# Patient Record
Sex: MALE | Race: WHITE | NOT HISPANIC OR LATINO | Employment: OTHER | URBAN - METROPOLITAN AREA
[De-identification: names, ages, dates, MRNs, and addresses within clinical notes are randomized per-mention and may not be internally consistent; named-entity substitution may affect disease eponyms.]

---

## 2017-12-01 ENCOUNTER — OFFICE VISIT (OUTPATIENT)
Dept: ORTHOPEDIC SURGERY | Facility: CLINIC | Age: 72
End: 2017-12-01

## 2017-12-01 VITALS — TEMPERATURE: 99 F | BODY MASS INDEX: 38.27 KG/M2 | WEIGHT: 216 LBS | HEIGHT: 63 IN

## 2017-12-01 DIAGNOSIS — M12.811 ROTATOR CUFF TEAR ARTHROPATHY, RIGHT: Primary | ICD-10-CM

## 2017-12-01 DIAGNOSIS — M75.101 ROTATOR CUFF TEAR ARTHROPATHY, RIGHT: Primary | ICD-10-CM

## 2017-12-01 PROCEDURE — 99204 OFFICE O/P NEW MOD 45 MIN: CPT | Performed by: ORTHOPAEDIC SURGERY

## 2017-12-01 RX ORDER — AMITRIPTYLINE HYDROCHLORIDE 25 MG/1
25 TABLET, FILM COATED ORAL NIGHTLY
COMMUNITY
End: 2018-06-21

## 2017-12-01 RX ORDER — ASPIRIN 81 MG/1
81 TABLET ORAL EVERY OTHER DAY
COMMUNITY

## 2017-12-01 RX ORDER — CETIRIZINE HYDROCHLORIDE 10 MG/1
10 TABLET ORAL DAILY PRN
COMMUNITY

## 2017-12-01 RX ORDER — LISINOPRIL 20 MG/1
10 TABLET ORAL EVERY MORNING
COMMUNITY
End: 2023-03-05 | Stop reason: HOSPADM

## 2017-12-01 RX ORDER — FINASTERIDE 5 MG/1
5 TABLET, FILM COATED ORAL EVERY MORNING
COMMUNITY
End: 2023-02-24

## 2017-12-01 RX ORDER — FERROUS SULFATE 325(65) MG
325 TABLET ORAL 2 TIMES DAILY
COMMUNITY

## 2017-12-01 RX ORDER — GABAPENTIN 400 MG/1
800 CAPSULE ORAL 3 TIMES DAILY
COMMUNITY

## 2017-12-01 RX ORDER — OMEPRAZOLE 20 MG/1
20 CAPSULE, DELAYED RELEASE ORAL EVERY EVENING
COMMUNITY

## 2017-12-01 RX ORDER — CHLORAL HYDRATE 500 MG
1000 CAPSULE ORAL
COMMUNITY

## 2017-12-01 RX ORDER — FUROSEMIDE 20 MG/1
20 TABLET ORAL EVERY MORNING
COMMUNITY

## 2017-12-01 RX ORDER — CHOLECALCIFEROL (VITAMIN D3) 125 MCG
500 CAPSULE ORAL EVERY OTHER DAY
COMMUNITY

## 2017-12-01 RX ORDER — SIMVASTATIN 20 MG
10 TABLET ORAL NIGHTLY
COMMUNITY

## 2018-01-03 PROBLEM — M75.101 ROTATOR CUFF TEAR ARTHROPATHY, RIGHT: Status: ACTIVE | Noted: 2018-01-03

## 2018-01-03 PROBLEM — M12.811 ROTATOR CUFF TEAR ARTHROPATHY, RIGHT: Status: ACTIVE | Noted: 2018-01-03

## 2018-01-10 ENCOUNTER — APPOINTMENT (OUTPATIENT)
Dept: PREADMISSION TESTING | Facility: HOSPITAL | Age: 73
End: 2018-01-10

## 2018-01-10 VITALS
DIASTOLIC BLOOD PRESSURE: 78 MMHG | TEMPERATURE: 98.6 F | WEIGHT: 222 LBS | SYSTOLIC BLOOD PRESSURE: 118 MMHG | OXYGEN SATURATION: 96 % | BODY MASS INDEX: 40.85 KG/M2 | HEIGHT: 62 IN | HEART RATE: 73 BPM | RESPIRATION RATE: 20 BRPM

## 2018-01-10 DIAGNOSIS — M12.811 ROTATOR CUFF TEAR ARTHROPATHY, RIGHT: ICD-10-CM

## 2018-01-10 DIAGNOSIS — M75.101 ROTATOR CUFF TEAR ARTHROPATHY, RIGHT: ICD-10-CM

## 2018-01-10 LAB
ANION GAP SERPL CALCULATED.3IONS-SCNC: 10.8 MMOL/L
BACTERIA UR QL AUTO: ABNORMAL /HPF
BILIRUB UR QL STRIP: NEGATIVE
BUN BLD-MCNC: 20 MG/DL (ref 8–23)
BUN/CREAT SERPL: 18.9 (ref 7–25)
CALCIUM SPEC-SCNC: 8.9 MG/DL (ref 8.6–10.5)
CHLORIDE SERPL-SCNC: 103 MMOL/L (ref 98–107)
CLARITY UR: CLEAR
CO2 SERPL-SCNC: 28.2 MMOL/L (ref 22–29)
COLOR UR: YELLOW
CREAT BLD-MCNC: 1.06 MG/DL (ref 0.76–1.27)
DEPRECATED RDW RBC AUTO: 45.8 FL (ref 37–54)
ERYTHROCYTE [DISTWIDTH] IN BLOOD BY AUTOMATED COUNT: 12.9 % (ref 11.5–14.5)
GFR SERPL CREATININE-BSD FRML MDRD: 69 ML/MIN/1.73
GFR SERPL CREATININE-BSD FRML MDRD: 83 ML/MIN/1.73
GLUCOSE BLD-MCNC: 128 MG/DL (ref 65–99)
GLUCOSE UR STRIP-MCNC: ABNORMAL MG/DL
HCT VFR BLD AUTO: 37.6 % (ref 40.4–52.2)
HGB BLD-MCNC: 11.9 G/DL (ref 13.7–17.6)
HGB UR QL STRIP.AUTO: NEGATIVE
HYALINE CASTS UR QL AUTO: ABNORMAL /LPF
KETONES UR QL STRIP: NEGATIVE
LEUKOCYTE ESTERASE UR QL STRIP.AUTO: ABNORMAL
MCH RBC QN AUTO: 30.8 PG (ref 27–32.7)
MCHC RBC AUTO-ENTMCNC: 31.6 G/DL (ref 32.6–36.4)
MCV RBC AUTO: 97.4 FL (ref 79.8–96.2)
NITRITE UR QL STRIP: NEGATIVE
PH UR STRIP.AUTO: <=5 [PH] (ref 5–8)
PLATELET # BLD AUTO: 197 10*3/MM3 (ref 140–500)
PMV BLD AUTO: 10.3 FL (ref 6–12)
POTASSIUM BLD-SCNC: 4.5 MMOL/L (ref 3.5–5.2)
PROT UR QL STRIP: NEGATIVE
RBC # BLD AUTO: 3.86 10*6/MM3 (ref 4.6–6)
RBC # UR: ABNORMAL /HPF
REF LAB TEST METHOD: ABNORMAL
SODIUM BLD-SCNC: 142 MMOL/L (ref 136–145)
SP GR UR STRIP: 1.02 (ref 1–1.03)
SQUAMOUS #/AREA URNS HPF: ABNORMAL /HPF
UROBILINOGEN UR QL STRIP: ABNORMAL
WBC NRBC COR # BLD: 6.24 10*3/MM3 (ref 4.5–10.7)
WBC UR QL AUTO: ABNORMAL /HPF

## 2018-01-10 PROCEDURE — 80048 BASIC METABOLIC PNL TOTAL CA: CPT | Performed by: ORTHOPAEDIC SURGERY

## 2018-01-10 PROCEDURE — 36415 COLL VENOUS BLD VENIPUNCTURE: CPT

## 2018-01-10 PROCEDURE — 87086 URINE CULTURE/COLONY COUNT: CPT | Performed by: ORTHOPAEDIC SURGERY

## 2018-01-10 PROCEDURE — 81001 URINALYSIS AUTO W/SCOPE: CPT | Performed by: ORTHOPAEDIC SURGERY

## 2018-01-10 PROCEDURE — 85027 COMPLETE CBC AUTOMATED: CPT | Performed by: ORTHOPAEDIC SURGERY

## 2018-01-10 RX ORDER — OMEGA-3S/DHA/EPA/FISH OIL/D3 300MG-1000
400 CAPSULE ORAL 2 TIMES DAILY
COMMUNITY
End: 2018-06-21

## 2018-01-10 RX ORDER — CEPHALEXIN 500 MG/1
500 CAPSULE ORAL EVERY MORNING
COMMUNITY

## 2018-01-10 NOTE — DISCHARGE INSTRUCTIONS
Take the following medications the morning of surgery with a small sip of water:omeprazole, pain med if needed        General Instructions:  • Do not eat solid food after midnight the night before surgery.  • You may drink clear liquids day of surgery but must stop at least one hour before your hospital arrival time.  • It is beneficial for you to have a clear drink that contains carbohydrates the day of surgery.  We suggest a 12 to 20 ounce bottle of Gatorade or Powerade for non-diabetic patients or a 12 to 20 ounce bottle of G2 or Powerade Zero for diabetic patients. (Pediatric patients, are not advised to drink a 12 to 20 ounce carbohydrate drink)    Clear liquids are liquids you can see through.  Nothing red in color.     Plain water                               Sports drinks  Sodas                                   Gelatin (Jell-O)  Fruit juices without pulp such as white grape juice and apple juice  Popsicles that contain no fruit or yogurt  Tea or coffee (no cream or milk added)  Gatorade / Powerade  G2 / Powerade Zero    • Infants may have breast milk up to four hours before surgery.  • Infants drinking formula may drink formula up to six hours before surgery.   • Patients who avoid smoking, chewing tobacco and alcohol for 4 weeks prior to surgery have a reduced risk of post-operative complications.  Quit smoking as many days before surgery as you can.  • Do not smoke, use chewing tobacco or drink alcohol the day of surgery.   • If applicable bring your C-PAP/ BI-PAP machine.  • Bring any papers given to you in the doctor’s office.  • Wear clean comfortable clothes and socks.  • Do not wear contact lenses or make-up.  Bring a case for your glasses.   • Bring crutches or walker if applicable.  • Remove all piercings.  Leave jewelry and any other valuables at home.  • Hair extensions with metal clips must be removed prior to surgery.  • The Pre-Admission Testing nurse will instruct you to bring medications if  unable to obtain an accurate list in Pre-Admission Testing.        If you were given a blood bank ID arm band remember to bring it with you the day of surgery.    Preventing a Surgical Site Infection:  • For 2 to 3 days before surgery, avoid shaving with a razor because the razor can irritate skin and make it easier to develop an infection.  • The night prior to surgery sleep in a clean bed with clean clothing.  Do not allow pets to sleep with you.  • Shower on the morning of surgery using a fresh bar of anti-bacterial soap (such as Dial) and clean washcloth.  Dry with a clean towel and dress in clean clothing.  • Ask your surgeon if you will be receiving antibiotics prior to surgery.  • Make sure you, your family, and all healthcare providers clean their hands with soap and water or an alcohol based hand  before caring for you or your wound.    Day of surgery:  Upon arrival, a Pre-op nurse and Anesthesiologist will review your health history, obtain vital signs, and answer questions you may have.  The only belongings needed at this time will be your home medications and if applicable your C-PAP/BI-PAP machine.  If you are staying overnight your family can leave the rest of your belongings in the car and bring them to your room later.  A Pre-op nurse will start an IV and you may receive medication in preparation for surgery, including something to help you relax.  Your family will be able to see you in the Pre-op area.  While you are in surgery your family should notify the waiting room  if they leave the waiting room area and provide a contact phone number.    Please be aware that surgery does come with discomfort.  We want to make every effort to control your discomfort so please discuss any uncontrolled symptoms with your nurse.   Your doctor will most likely have prescribed pain medications.      If you are going home after surgery you will receive individualized written care instructions  before being discharged.  A responsible adult must drive you to and from the hospital on the day of your surgery and stay with you for 24 hours.    If you are staying overnight following surgery, you will be transported to your hospital room following the recovery period.  [facility] has all private rooms.    If you have any questions please call Pre-Admission Testing at [phone].  Deductibles and co-payments are collected on the day of service. Please be prepared to pay the required co-pay, deductible or deposit on the day of service as defined by your plan.    2% CHLORAHEXIDINE GLUCONATE* CLOTH  Preparing or “prepping” skin before surgery can reduce the risk of infection at the surgical site. To make the process easier, [facility] has chosen disposable cloths moistened with a rinse-free, 2% Chlorhexidine Gluconate (CHG) antiseptic solution. The steps below outline the prepping process and should be carefully followed.        Use the prep cloth on the area that is circled in the diagram             Directions Night before Surgery  1) Shower using a fresh bar of anti-bacterial soap (such as Dial) and clean washcloth.  Use a clean towel to completely dry your skin.  2) Do not use any lotions, oils or creams on your skin.  3) Open the package and remove 1 cloth, wipe your skin for 30 seconds in a circular motion.  Allow to dry for 3 minutes.  4) Repeat #3 with second cloth.  5) Do not touch your eyes, ears, or mouth with the prep cloth.  6) Allow the wet prep solution to air dry.  7) Discard the prep cloth and wash your hands with soap and water.   8) Dress in clean bed clothes and sleep on fresh clean bed sheets.   9) You may experience some temporary itching after the prep.    Directions Day of Surgery  1) Repeat steps 1,2,3,4,5,6,7, and 9.   2) Dress in clean clothes before coming to the hospital.    BACTROBAN NASAL OINTMENT  There are many germs normally in your nose. Bactroban is an  ointment that will help reduce these germs. Please follow these instructions for Bactroban use:    ____Two days before surgery in the evening Date________    ____The day before surgery in the morning  Date________    ____The day before surgery in the evening              Date________    ____The day of surgery in the morning    Date________    **Squirt ½ package of Bactroban Ointment onto a cotton applicator and apply to inside of 1st nostril.  Squirt the remaining Bactroban and apply to the inside of the other nostril.    PERIDEX- ORAL:  Use only if your surgeon has ordered  Use the night before and morning of surgery - Swish, gargle, and spit - do not swallow.

## 2018-01-12 ENCOUNTER — OFFICE VISIT (OUTPATIENT)
Dept: ORTHOPEDIC SURGERY | Facility: CLINIC | Age: 73
End: 2018-01-12

## 2018-01-12 VITALS — WEIGHT: 223 LBS | BODY MASS INDEX: 38.07 KG/M2 | TEMPERATURE: 97.5 F | HEIGHT: 64 IN

## 2018-01-12 DIAGNOSIS — M12.811 ROTATOR CUFF TEAR ARTHROPATHY, RIGHT: Primary | ICD-10-CM

## 2018-01-12 DIAGNOSIS — M75.101 ROTATOR CUFF TEAR ARTHROPATHY, RIGHT: Primary | ICD-10-CM

## 2018-01-12 DIAGNOSIS — Z01.818 PRE-OP EVALUATION: ICD-10-CM

## 2018-01-12 LAB
BACTERIA SPEC AEROBE CULT: ABNORMAL
BACTERIA SPEC AEROBE CULT: ABNORMAL

## 2018-01-12 PROCEDURE — 73030 X-RAY EXAM OF SHOULDER: CPT | Performed by: ORTHOPAEDIC SURGERY

## 2018-01-12 PROCEDURE — S0260 H&P FOR SURGERY: HCPCS | Performed by: ORTHOPAEDIC SURGERY

## 2018-01-14 NOTE — PROGRESS NOTES
History & Physical         Patient: Jermaine Denis    YOB: 1945    Medical Record Number: [id number]    Chief Complaints:   Chief Complaint   Patient presents with   • Right Shoulder - Pre-op Exam       History of Present Illness: 72 y.o. male presents with   Chief Complaint   Patient presents with   • Right Shoulder - Pre-op Exam   Reports a long history of progressively worsening pain, motion loss, and dysfunction.  Describes current pain as severe.  Has failed prolonged conservative treatment.  Denies any new complaints or issues.    Allergies: No Known Allergies    Medications:   Home Medications:    Current Outpatient Prescriptions:   •  amitriptyline (ELAVIL) 25 MG tablet, Take 25 mg by mouth Every Night., Disp: , Rfl:   •  aspirin 81 MG EC tablet, Take 81 mg by mouth Every Morning. Stopped 1/11/18, Disp: , Rfl:   •  calcium citrate-vitamin d (CALCITRATE) 315-250 MG-UNIT tablet tablet, Take 1 tablet by mouth Every Morning., Disp: , Rfl:   •  cephalexin (KEFLEX) 500 MG capsule, Take 500 mg by mouth Every Morning., Disp: , Rfl:   •  cetirizine (zyrTEC) 10 MG tablet, Take 10 mg by mouth Every Morning., Disp: , Rfl:   •  Chlorhexidine Gluconate 2 % pads, Apply 1 application topically 2 (Two) Times a Day. Pre op, Disp: , Rfl:   •  cholecalciferol (VITAMIN D3) 400 units tablet, Take 400 Units by mouth 2 (Two) Times a Day. 2 tabs  Each dose, Disp: , Rfl:   •  ferrous sulfate 325 (65 FE) MG tablet, Take 325 mg by mouth 2 (Two) Times a Day., Disp: , Rfl:   •  finasteride (PROSCAR) 5 MG tablet, Take 5 mg by mouth Every Morning., Disp: , Rfl:   •  furosemide (LASIX) 20 MG tablet, Take 20 mg by mouth Every Morning., Disp: , Rfl:   •  gabapentin (NEURONTIN) 400 MG capsule, Take 400 mg by mouth 3 (Three) Times a Day., Disp: , Rfl:   •  lisinopril (PRINIVIL,ZESTRIL) 20 MG tablet, Take 20 mg by mouth Every Morning. 1/2 tab, Disp: , Rfl:   •  mupirocin (BACTROBAN) 2 % ointment, Apply 1 application topically 3  (Three) Times a Day. Pre op, Disp: , Rfl:   •  Omega-3 Fatty Acids (FISH OIL) 1000 MG capsule capsule, Take  by mouth Daily With Breakfast., Disp: , Rfl:   •  omeprazole (priLOSEC) 20 MG capsule, Take 20 mg by mouth Every Evening., Disp: , Rfl:   •  simvastatin (ZOCOR) 20 MG tablet, Take 20 mg by mouth Every Night., Disp: , Rfl:   •  vitamin B-12 (CYANOCOBALAMIN) 500 MCG tablet, Take 500 mcg by mouth Daily., Disp: , Rfl:     Past Medical History:   Diagnosis Date   • Arthritis    • BPH (benign prostatic hyperplasia)    • GERD (gastroesophageal reflux disease)    • High cholesterol    • History of hepatitis C     treated medically   • History of kidney stones    • Hypertension    • Sleep apnea           Past Surgical History:   Procedure Laterality Date   • ANKLE SURGERY Right     orif   • CARPAL TUNNEL RELEASE Left    • GASTRIC BYPASS      2008   • HAND SURGERY Right     thumb    • JOINT REPLACEMENT      lt knee          Social History     Occupational History   • Not on file.     Social History Main Topics   • Smoking status: Former Smoker     Types: Cigarettes     Quit date: 1995   • Smokeless tobacco: Never Used   • Alcohol use No   • Drug use: No   • Sexual activity: Not on file      Social History     Social History Narrative          Family History   Problem Relation Age of Onset   • Malig Hyperthermia Neg Hx        Review of Systems:     Constitutional:  Denies fever, shaking or chills   Eyes:  Denies change in visual acuity   HEENT:  Denies nasal congestion or sore throat   Respiratory:  Denies cough or shortness of breath   Cardiovascular:  Denies chest pain or edema   GI:  Denies abdominal pain, nausea, vomiting, bloody stools or diarrhea   Musculoskeletal:  Denies numbness tingling or loss of motor function except as outlined above in history of present illness.  Integument:  Denies rash, lesion or ulceration   Neurologic:  Denies headache or focal weakness, deficits      All other pertinent positives and  negatives as noted above in HPI.    Physical Exam: 72 y.o. male  General:  Patient is awake and alert.  Appears in no acute distress or discomfort.    Psych:  Affect and demeanor are appropriate.    Eyes:  Conjunctiva and sclera appear grossly normal.  Eyes track well and EOM seem to be intact.    Ears:  No gross abnormalities.  Hearing adequate for the exam.    Cardiovascular:  Regular rate and rhythm.    Lungs:  Good chest expansion.  Breathing unlabored.    Lymph:  No palpable adenopathy about neck or axilla.    Neck:  Supple.  Normal ROM.  Negative Spurling's for shoulder or arm pain.    Right upper extremity:  Skin benign and intact without evidence for swelling, masses or atrophy.  No palpable masses.  No focal areas of exquisite tenderness.  Shoulder ROM limited and uncomfortable.  No evident instability or apprehension.  Hornblowers negative.  ER lag sign positive.  Good strength in deltoid, wrist and hand.  Intact sensation in arm, hand.  Palpable radial pulse with brisk cap refill.    Diagnostic Tests:  Lab Results   Component Value Date    GLUCOSE 128 (H) 01/10/2018    CALCIUM 8.9 01/10/2018     01/10/2018    K 4.5 01/10/2018    CO2 28.2 01/10/2018     01/10/2018    BUN 20 01/10/2018    CREATININE 1.06 01/10/2018    EGFRIFAFRI 83 01/10/2018    EGFRIFNONA 69 01/10/2018    BCR 18.9 01/10/2018    ANIONGAP 10.8 01/10/2018     Lab Results   Component Value Date    WBC 6.24 01/10/2018    HGB 11.9 (L) 01/10/2018    HCT 37.6 (L) 01/10/2018    MCV 97.4 (H) 01/10/2018     01/10/2018     No results found for: INR, PROTIME     Imaging: AP and scapular Y views the right shoulder are ordered and reviewed.  These are compared to his previous x-rays.  He has what appears to be severe rotator cuff tear arthropathy with an absent acromiohumeral interval and marked arthrosis.    Assessment:  Right shoulder rotator cuff tear arthropathy    Plan: We had a thorough discussion regarding the risks, benefits  and alternatives to an arthroplasty and non-surgical management versus surgery.  I explained that surgical risks include infection, hematoma, hardware related complications including failure of fixation, loosening, fracture, persistent pain and/or loss of motion, iatrogenic nerve and/or blood vessel injury resulting in permanent weakness, numbness or dysfunction, DVT, PE, positioning related neuropraxia, and anesthesia related complications resulting in death.  We discussed the indication for a reverse as opposed to a standard total shoulder and the risks inherent to the reverse including notching, glenoid loosening, instability, and traction related neuropraxia, any of which could result in persistent pain or problems requiring further surgery.  Lastly, we discussed the rehab and all that will be expected of the patient post operatively to ensure an optimal outcome.  The patient voiced understanding of the risks, benefits, and alternative forms of treatment that were discussed and the patient consents to proceed with the reverse arthroplasty.        Date: 1/14/2018    Alex Ordaz MD

## 2018-01-18 ENCOUNTER — ANESTHESIA EVENT (OUTPATIENT)
Dept: PERIOP | Facility: HOSPITAL | Age: 73
End: 2018-01-18

## 2018-01-18 ENCOUNTER — ANESTHESIA (OUTPATIENT)
Dept: PERIOP | Facility: HOSPITAL | Age: 73
End: 2018-01-18

## 2018-01-18 ENCOUNTER — APPOINTMENT (OUTPATIENT)
Dept: GENERAL RADIOLOGY | Facility: HOSPITAL | Age: 73
End: 2018-01-18

## 2018-01-18 ENCOUNTER — HOSPITAL ENCOUNTER (INPATIENT)
Facility: HOSPITAL | Age: 73
LOS: 1 days | Discharge: HOME | End: 2018-01-19
Attending: ORTHOPAEDIC SURGERY | Admitting: ORTHOPAEDIC SURGERY

## 2018-01-18 DIAGNOSIS — M12.811 ROTATOR CUFF TEAR ARTHROPATHY, RIGHT: ICD-10-CM

## 2018-01-18 DIAGNOSIS — M75.101 ROTATOR CUFF TEAR ARTHROPATHY, RIGHT: ICD-10-CM

## 2018-01-18 PROBLEM — M12.819 ROTATOR CUFF ARTHROPATHY: Status: ACTIVE | Noted: 2018-01-18

## 2018-01-18 PROCEDURE — 25010000002 FENTANYL CITRATE (PF) 100 MCG/2ML SOLUTION: Performed by: ANESTHESIOLOGY

## 2018-01-18 PROCEDURE — C1713 ANCHOR/SCREW BN/BN,TIS/BN: HCPCS | Performed by: ORTHOPAEDIC SURGERY

## 2018-01-18 PROCEDURE — 25010000002 PROPOFOL 10 MG/ML EMULSION: Performed by: NURSE ANESTHETIST, CERTIFIED REGISTERED

## 2018-01-18 PROCEDURE — 23472 RECONSTRUCT SHOULDER JOINT: CPT | Performed by: ORTHOPAEDIC SURGERY

## 2018-01-18 PROCEDURE — 25010000002 MIDAZOLAM PER 1 MG: Performed by: ANESTHESIOLOGY

## 2018-01-18 PROCEDURE — 73020 X-RAY EXAM OF SHOULDER: CPT

## 2018-01-18 PROCEDURE — 25010000002 NEOSTIGMINE PER 0.5 MG: Performed by: ANESTHESIOLOGY

## 2018-01-18 PROCEDURE — C1776 JOINT DEVICE (IMPLANTABLE): HCPCS | Performed by: ORTHOPAEDIC SURGERY

## 2018-01-18 PROCEDURE — 25010000002 VANCOMYCIN 10 G RECONSTITUTED SOLUTION: Performed by: ORTHOPAEDIC SURGERY

## 2018-01-18 PROCEDURE — 0LS30ZZ REPOSITION RIGHT UPPER ARM TENDON, OPEN APPROACH: ICD-10-PCS | Performed by: ORTHOPAEDIC SURGERY

## 2018-01-18 PROCEDURE — 0RRJ00Z REPLACEMENT OF RIGHT SHOULDER JOINT WITH REVERSE BALL AND SOCKET SYNTHETIC SUBSTITUTE, OPEN APPROACH: ICD-10-PCS | Performed by: ORTHOPAEDIC SURGERY

## 2018-01-18 PROCEDURE — 25010000003 CEFAZOLIN IN DEXTROSE 2-4 GM/100ML-% SOLUTION: Performed by: ORTHOPAEDIC SURGERY

## 2018-01-18 PROCEDURE — 25010000002 DEXAMETHASONE PER 1 MG: Performed by: NURSE ANESTHETIST, CERTIFIED REGISTERED

## 2018-01-18 DEVICE — SCRW FIX LK HEX 4.75X30MM: Type: IMPLANTABLE DEVICE | Status: FUNCTIONAL

## 2018-01-18 DEVICE — STEM HUM/SHLDR COMPREHENSIVE MINI 11X83MM: Type: IMPLANTABLE DEVICE | Status: FUNCTIONAL

## 2018-01-18 DEVICE — BASEPLT GLEN COMPR MINI W TPR ADAPTR 25: Type: IMPLANTABLE DEVICE | Status: FUNCTIONAL

## 2018-01-18 DEVICE — GLENOSPHERE VERSA DIAL FIX STD 36MM: Type: IMPLANTABLE DEVICE | Status: FUNCTIONAL

## 2018-01-18 DEVICE — SCRW COMPRNSV CNTRL 6.5X30MM REUS: Type: IMPLANTABLE DEVICE | Status: FUNCTIONAL

## 2018-01-18 DEVICE — BEAR HUM COMPRNSV ARCOMXL STD 44 36: Type: IMPLANTABLE DEVICE | Status: FUNCTIONAL

## 2018-01-18 DEVICE — SCRW FIX LK HEX 4.75X15MM: Type: IMPLANTABLE DEVICE | Status: FUNCTIONAL

## 2018-01-18 DEVICE — SCRW FIX LK HEX 4.75X25MM: Type: IMPLANTABLE DEVICE | Status: FUNCTIONAL

## 2018-01-18 DEVICE — TRY HUMERAL PLS5 COBALT 44MM: Type: IMPLANTABLE DEVICE | Status: FUNCTIONAL

## 2018-01-18 DEVICE — IMPLANTABLE DEVICE: Type: IMPLANTABLE DEVICE | Status: FUNCTIONAL

## 2018-01-18 RX ORDER — AMITRIPTYLINE HYDROCHLORIDE 25 MG/1
25 TABLET, FILM COATED ORAL NIGHTLY
Status: DISCONTINUED | OUTPATIENT
Start: 2018-01-18 | End: 2018-01-19 | Stop reason: HOSPADM

## 2018-01-18 RX ORDER — FAMOTIDINE 10 MG/ML
20 INJECTION, SOLUTION INTRAVENOUS ONCE
Status: COMPLETED | OUTPATIENT
Start: 2018-01-18 | End: 2018-01-18

## 2018-01-18 RX ORDER — PROMETHAZINE HYDROCHLORIDE 25 MG/1
25 SUPPOSITORY RECTAL ONCE AS NEEDED
Status: DISCONTINUED | OUTPATIENT
Start: 2018-01-18 | End: 2018-01-18 | Stop reason: HOSPADM

## 2018-01-18 RX ORDER — EPHEDRINE SULFATE 50 MG/ML
5 INJECTION, SOLUTION INTRAVENOUS ONCE AS NEEDED
Status: DISCONTINUED | OUTPATIENT
Start: 2018-01-18 | End: 2018-01-18 | Stop reason: HOSPADM

## 2018-01-18 RX ORDER — NALOXONE HCL 0.4 MG/ML
0.1 VIAL (ML) INJECTION
Status: DISCONTINUED | OUTPATIENT
Start: 2018-01-18 | End: 2018-01-19 | Stop reason: HOSPADM

## 2018-01-18 RX ORDER — PROMETHAZINE HYDROCHLORIDE 25 MG/ML
12.5 INJECTION, SOLUTION INTRAMUSCULAR; INTRAVENOUS ONCE AS NEEDED
Status: DISCONTINUED | OUTPATIENT
Start: 2018-01-18 | End: 2018-01-18 | Stop reason: HOSPADM

## 2018-01-18 RX ORDER — SODIUM CHLORIDE, SODIUM LACTATE, POTASSIUM CHLORIDE, CALCIUM CHLORIDE 600; 310; 30; 20 MG/100ML; MG/100ML; MG/100ML; MG/100ML
9 INJECTION, SOLUTION INTRAVENOUS CONTINUOUS
Status: DISCONTINUED | OUTPATIENT
Start: 2018-01-18 | End: 2018-01-18 | Stop reason: HOSPADM

## 2018-01-18 RX ORDER — OXYCODONE AND ACETAMINOPHEN 7.5; 325 MG/1; MG/1
2 TABLET ORAL EVERY 4 HOURS PRN
Status: DISCONTINUED | OUTPATIENT
Start: 2018-01-18 | End: 2018-01-19 | Stop reason: HOSPADM

## 2018-01-18 RX ORDER — MIDAZOLAM HYDROCHLORIDE 1 MG/ML
2 INJECTION INTRAMUSCULAR; INTRAVENOUS
Status: DISCONTINUED | OUTPATIENT
Start: 2018-01-18 | End: 2018-01-18 | Stop reason: HOSPADM

## 2018-01-18 RX ORDER — MAGNESIUM HYDROXIDE 1200 MG/15ML
LIQUID ORAL AS NEEDED
Status: DISCONTINUED | OUTPATIENT
Start: 2018-01-18 | End: 2018-01-18 | Stop reason: HOSPADM

## 2018-01-18 RX ORDER — ROCURONIUM BROMIDE 10 MG/ML
INJECTION, SOLUTION INTRAVENOUS AS NEEDED
Status: DISCONTINUED | OUTPATIENT
Start: 2018-01-18 | End: 2018-01-18 | Stop reason: SURG

## 2018-01-18 RX ORDER — FENTANYL CITRATE 50 UG/ML
50 INJECTION, SOLUTION INTRAMUSCULAR; INTRAVENOUS
Status: DISCONTINUED | OUTPATIENT
Start: 2018-01-18 | End: 2018-01-18 | Stop reason: HOSPADM

## 2018-01-18 RX ORDER — ACETAMINOPHEN 325 MG/1
325 TABLET ORAL EVERY 4 HOURS PRN
Status: DISCONTINUED | OUTPATIENT
Start: 2018-01-18 | End: 2018-01-19 | Stop reason: HOSPADM

## 2018-01-18 RX ORDER — MIDAZOLAM HYDROCHLORIDE 1 MG/ML
1 INJECTION INTRAMUSCULAR; INTRAVENOUS
Status: DISCONTINUED | OUTPATIENT
Start: 2018-01-18 | End: 2018-01-18 | Stop reason: HOSPADM

## 2018-01-18 RX ORDER — ONDANSETRON 2 MG/ML
4 INJECTION INTRAMUSCULAR; INTRAVENOUS ONCE AS NEEDED
Status: DISCONTINUED | OUTPATIENT
Start: 2018-01-18 | End: 2018-01-18 | Stop reason: HOSPADM

## 2018-01-18 RX ORDER — LIDOCAINE HYDROCHLORIDE 20 MG/ML
INJECTION, SOLUTION INFILTRATION; PERINEURAL AS NEEDED
Status: DISCONTINUED | OUTPATIENT
Start: 2018-01-18 | End: 2018-01-18 | Stop reason: SURG

## 2018-01-18 RX ORDER — DIPHENHYDRAMINE HYDROCHLORIDE 50 MG/ML
12.5 INJECTION INTRAMUSCULAR; INTRAVENOUS
Status: DISCONTINUED | OUTPATIENT
Start: 2018-01-18 | End: 2018-01-18 | Stop reason: HOSPADM

## 2018-01-18 RX ORDER — TRANEXAMIC ACID 100 MG/ML
INJECTION, SOLUTION INTRAVENOUS AS NEEDED
Status: DISCONTINUED | OUTPATIENT
Start: 2018-01-18 | End: 2018-01-18 | Stop reason: SURG

## 2018-01-18 RX ORDER — DEXAMETHASONE SODIUM PHOSPHATE 10 MG/ML
INJECTION INTRAMUSCULAR; INTRAVENOUS AS NEEDED
Status: DISCONTINUED | OUTPATIENT
Start: 2018-01-18 | End: 2018-01-18 | Stop reason: SURG

## 2018-01-18 RX ORDER — NALOXONE HCL 0.4 MG/ML
0.2 VIAL (ML) INJECTION AS NEEDED
Status: DISCONTINUED | OUTPATIENT
Start: 2018-01-18 | End: 2018-01-18 | Stop reason: HOSPADM

## 2018-01-18 RX ORDER — FLUMAZENIL 0.1 MG/ML
0.2 INJECTION INTRAVENOUS AS NEEDED
Status: DISCONTINUED | OUTPATIENT
Start: 2018-01-18 | End: 2018-01-18 | Stop reason: HOSPADM

## 2018-01-18 RX ORDER — FINASTERIDE 5 MG/1
5 TABLET, FILM COATED ORAL EVERY MORNING
Status: DISCONTINUED | OUTPATIENT
Start: 2018-01-19 | End: 2018-01-19 | Stop reason: HOSPADM

## 2018-01-18 RX ORDER — LABETALOL HYDROCHLORIDE 5 MG/ML
5 INJECTION, SOLUTION INTRAVENOUS
Status: DISCONTINUED | OUTPATIENT
Start: 2018-01-18 | End: 2018-01-18 | Stop reason: HOSPADM

## 2018-01-18 RX ORDER — CEFAZOLIN SODIUM 2 G/100ML
2 INJECTION, SOLUTION INTRAVENOUS EVERY 8 HOURS
Status: COMPLETED | OUTPATIENT
Start: 2018-01-18 | End: 2018-01-19

## 2018-01-18 RX ORDER — DOCUSATE SODIUM 100 MG/1
100 CAPSULE, LIQUID FILLED ORAL 2 TIMES DAILY PRN
Status: DISCONTINUED | OUTPATIENT
Start: 2018-01-18 | End: 2018-01-19 | Stop reason: HOSPADM

## 2018-01-18 RX ORDER — GLYCOPYRROLATE 0.2 MG/ML
INJECTION INTRAMUSCULAR; INTRAVENOUS AS NEEDED
Status: DISCONTINUED | OUTPATIENT
Start: 2018-01-18 | End: 2018-01-18 | Stop reason: SURG

## 2018-01-18 RX ORDER — HYDROMORPHONE HCL 110MG/55ML
0.5 PATIENT CONTROLLED ANALGESIA SYRINGE INTRAVENOUS
Status: DISCONTINUED | OUTPATIENT
Start: 2018-01-18 | End: 2018-01-19 | Stop reason: HOSPADM

## 2018-01-18 RX ORDER — ATORVASTATIN CALCIUM 10 MG/1
10 TABLET, FILM COATED ORAL DAILY
Status: DISCONTINUED | OUTPATIENT
Start: 2018-01-18 | End: 2018-01-19 | Stop reason: HOSPADM

## 2018-01-18 RX ORDER — HYDRALAZINE HYDROCHLORIDE 20 MG/ML
5 INJECTION INTRAMUSCULAR; INTRAVENOUS
Status: DISCONTINUED | OUTPATIENT
Start: 2018-01-18 | End: 2018-01-18 | Stop reason: HOSPADM

## 2018-01-18 RX ORDER — SODIUM CHLORIDE 0.9 % (FLUSH) 0.9 %
1-10 SYRINGE (ML) INJECTION AS NEEDED
Status: DISCONTINUED | OUTPATIENT
Start: 2018-01-18 | End: 2018-01-18 | Stop reason: HOSPADM

## 2018-01-18 RX ORDER — FERROUS SULFATE 325(65) MG
325 TABLET ORAL 2 TIMES DAILY
Status: DISCONTINUED | OUTPATIENT
Start: 2018-01-18 | End: 2018-01-19 | Stop reason: HOSPADM

## 2018-01-18 RX ORDER — HYDROMORPHONE HCL 110MG/55ML
0.5 PATIENT CONTROLLED ANALGESIA SYRINGE INTRAVENOUS
Status: DISCONTINUED | OUTPATIENT
Start: 2018-01-18 | End: 2018-01-18 | Stop reason: HOSPADM

## 2018-01-18 RX ORDER — FUROSEMIDE 20 MG/1
20 TABLET ORAL EVERY MORNING
Status: DISCONTINUED | OUTPATIENT
Start: 2018-01-19 | End: 2018-01-19 | Stop reason: HOSPADM

## 2018-01-18 RX ORDER — LIDOCAINE HYDROCHLORIDE 10 MG/ML
0.5 INJECTION, SOLUTION EPIDURAL; INFILTRATION; INTRACAUDAL; PERINEURAL ONCE AS NEEDED
Status: DISCONTINUED | OUTPATIENT
Start: 2018-01-18 | End: 2018-01-18 | Stop reason: HOSPADM

## 2018-01-18 RX ORDER — SODIUM CHLORIDE 9 MG/ML
100 INJECTION, SOLUTION INTRAVENOUS CONTINUOUS
Status: DISCONTINUED | OUTPATIENT
Start: 2018-01-18 | End: 2018-01-19 | Stop reason: HOSPADM

## 2018-01-18 RX ORDER — GABAPENTIN 400 MG/1
800 CAPSULE ORAL 3 TIMES DAILY
Status: DISCONTINUED | OUTPATIENT
Start: 2018-01-18 | End: 2018-01-19 | Stop reason: HOSPADM

## 2018-01-18 RX ORDER — PROPOFOL 10 MG/ML
VIAL (ML) INTRAVENOUS AS NEEDED
Status: DISCONTINUED | OUTPATIENT
Start: 2018-01-18 | End: 2018-01-18 | Stop reason: SURG

## 2018-01-18 RX ORDER — LISINOPRIL 20 MG/1
10 TABLET ORAL EVERY MORNING
Status: DISCONTINUED | OUTPATIENT
Start: 2018-01-19 | End: 2018-01-19 | Stop reason: HOSPADM

## 2018-01-18 RX ORDER — BISACODYL 10 MG
10 SUPPOSITORY, RECTAL RECTAL DAILY PRN
Status: DISCONTINUED | OUTPATIENT
Start: 2018-01-18 | End: 2018-01-19 | Stop reason: HOSPADM

## 2018-01-18 RX ORDER — PROMETHAZINE HYDROCHLORIDE 25 MG/1
25 TABLET ORAL ONCE AS NEEDED
Status: DISCONTINUED | OUTPATIENT
Start: 2018-01-18 | End: 2018-01-18 | Stop reason: HOSPADM

## 2018-01-18 RX ORDER — PANTOPRAZOLE SODIUM 40 MG/1
40 TABLET, DELAYED RELEASE ORAL EVERY MORNING
Status: DISCONTINUED | OUTPATIENT
Start: 2018-01-19 | End: 2018-01-19 | Stop reason: HOSPADM

## 2018-01-18 RX ADMIN — AMITRIPTYLINE HYDROCHLORIDE 25 MG: 25 TABLET, FILM COATED ORAL at 20:42

## 2018-01-18 RX ADMIN — FAMOTIDINE 20 MG: 10 INJECTION, SOLUTION INTRAVENOUS at 13:22

## 2018-01-18 RX ADMIN — SODIUM CHLORIDE, POTASSIUM CHLORIDE, SODIUM LACTATE AND CALCIUM CHLORIDE: 600; 310; 30; 20 INJECTION, SOLUTION INTRAVENOUS at 15:23

## 2018-01-18 RX ADMIN — GLYCOPYRROLATE 0.3 MG: 0.2 INJECTION INTRAMUSCULAR; INTRAVENOUS at 15:43

## 2018-01-18 RX ADMIN — Medication 2 MG: at 13:21

## 2018-01-18 RX ADMIN — FERROUS SULFATE TAB 325 MG (65 MG ELEMENTAL FE) 325 MG: 325 (65 FE) TAB at 20:42

## 2018-01-18 RX ADMIN — NEOSTIGMINE METHYLSULFATE 2 MG: 1 INJECTION INTRAMUSCULAR; INTRAVENOUS; SUBCUTANEOUS at 15:43

## 2018-01-18 RX ADMIN — OXYCODONE HYDROCHLORIDE AND ACETAMINOPHEN 2 TABLET: 7.5; 325 TABLET ORAL at 23:24

## 2018-01-18 RX ADMIN — GABAPENTIN 800 MG: 400 CAPSULE ORAL at 20:42

## 2018-01-18 RX ADMIN — DEXAMETHASONE SODIUM PHOSPHATE 6 MG: 10 INJECTION INTRAMUSCULAR; INTRAVENOUS at 15:03

## 2018-01-18 RX ADMIN — ROCURONIUM BROMIDE 50 MG: 10 INJECTION INTRAVENOUS at 14:32

## 2018-01-18 RX ADMIN — TRANEXAMIC ACID 1000 MG: 100 INJECTION, SOLUTION INTRAVENOUS at 15:18

## 2018-01-18 RX ADMIN — MUPIROCIN: 20 OINTMENT TOPICAL at 20:42

## 2018-01-18 RX ADMIN — SODIUM CHLORIDE 100 ML/HR: 9 INJECTION, SOLUTION INTRAVENOUS at 20:43

## 2018-01-18 RX ADMIN — PROPOFOL 150 MG: 10 INJECTION, EMULSION INTRAVENOUS at 14:32

## 2018-01-18 RX ADMIN — VANCOMYCIN HYDROCHLORIDE 1500 MG: 1 INJECTION, POWDER, LYOPHILIZED, FOR SOLUTION INTRAVENOUS at 23:30

## 2018-01-18 RX ADMIN — LIDOCAINE HYDROCHLORIDE 60 MG: 20 INJECTION, SOLUTION INFILTRATION; PERINEURAL at 14:32

## 2018-01-18 RX ADMIN — CEFAZOLIN SODIUM 2 G: 2 INJECTION, SOLUTION INTRAVENOUS at 22:17

## 2018-01-18 RX ADMIN — FAMOTIDINE 20 MG: 10 INJECTION, SOLUTION INTRAVENOUS at 14:02

## 2018-01-18 RX ADMIN — SODIUM CHLORIDE, POTASSIUM CHLORIDE, SODIUM LACTATE AND CALCIUM CHLORIDE 9 ML/HR: 600; 310; 30; 20 INJECTION, SOLUTION INTRAVENOUS at 12:46

## 2018-01-18 RX ADMIN — VANCOMYCIN HYDROCHLORIDE 1500 MG: 100 INJECTION, POWDER, LYOPHILIZED, FOR SOLUTION INTRAVENOUS at 12:46

## 2018-01-18 RX ADMIN — Medication 2 MG: at 14:02

## 2018-01-18 RX ADMIN — FENTANYL CITRATE 50 MCG: 50 INJECTION INTRAMUSCULAR; INTRAVENOUS at 14:03

## 2018-01-18 RX ADMIN — ATORVASTATIN CALCIUM 10 MG: 10 TABLET, FILM COATED ORAL at 20:42

## 2018-01-18 RX ADMIN — CEFAZOLIN SODIUM 2 G: 1 INJECTION, POWDER, FOR SOLUTION INTRAMUSCULAR; INTRAVENOUS at 14:50

## 2018-01-18 NOTE — ANESTHESIA POSTPROCEDURE EVALUATION
"Patient: Jermaine Denis    Procedure Summary     Date Anesthesia Start Anesthesia Stop Room / Location    01/18/18 1420 1604  [facility] /  [facility]       Procedure Diagnosis Surgeon Provider    Reverse Total Shoulder Arthroplasty (Right Shoulder) Rotator cuff tear arthropathy, right  (Rotator cuff tear arthropathy, right [M12.811]) MD Marquez Camarillo MD          Anesthesia Type: general  Last vitals  BP   129/75 (01/18/18 1655)   Temp   36.6 °C (97.8 °F) (01/18/18 1559)   Pulse   67 (01/18/18 1655)   Resp   18 (01/18/18 1655)     SpO2   100 % (01/18/18 1655)     Post Anesthesia Care and Evaluation    Patient location during evaluation: bedside  Patient participation: complete - patient participated  Level of consciousness: sleepy but conscious  Pain score: 0  Pain management: adequate  Airway patency: patent  Anesthetic complications: No anesthetic complications    Cardiovascular status: acceptable  Respiratory status: acceptable  Hydration status: acceptable    Comments: /75  Pulse 67  Temp 36.6 °C (97.8 °F) (Oral)   Resp 18  Ht 160 cm (63\")  Wt 101 kg (223 lb)  SpO2 100%  BMI 39.5 kg/m2        "

## 2018-01-18 NOTE — H&P (VIEW-ONLY)
History & Physical         Patient: Jermaine Denis    YOB: 1945    Medical Record Number: [id number]    Chief Complaints:   Chief Complaint   Patient presents with   • Right Shoulder - Pre-op Exam       History of Present Illness: 72 y.o. male presents with   Chief Complaint   Patient presents with   • Right Shoulder - Pre-op Exam   Reports a long history of progressively worsening pain, motion loss, and dysfunction.  Describes current pain as severe.  Has failed prolonged conservative treatment.  Denies any new complaints or issues.    Allergies: No Known Allergies    Medications:   Home Medications:    Current Outpatient Prescriptions:   •  amitriptyline (ELAVIL) 25 MG tablet, Take 25 mg by mouth Every Night., Disp: , Rfl:   •  aspirin 81 MG EC tablet, Take 81 mg by mouth Every Morning. Stopped 1/11/18, Disp: , Rfl:   •  calcium citrate-vitamin d (CALCITRATE) 315-250 MG-UNIT tablet tablet, Take 1 tablet by mouth Every Morning., Disp: , Rfl:   •  cephalexin (KEFLEX) 500 MG capsule, Take 500 mg by mouth Every Morning., Disp: , Rfl:   •  cetirizine (zyrTEC) 10 MG tablet, Take 10 mg by mouth Every Morning., Disp: , Rfl:   •  Chlorhexidine Gluconate 2 % pads, Apply 1 application topically 2 (Two) Times a Day. Pre op, Disp: , Rfl:   •  cholecalciferol (VITAMIN D3) 400 units tablet, Take 400 Units by mouth 2 (Two) Times a Day. 2 tabs  Each dose, Disp: , Rfl:   •  ferrous sulfate 325 (65 FE) MG tablet, Take 325 mg by mouth 2 (Two) Times a Day., Disp: , Rfl:   •  finasteride (PROSCAR) 5 MG tablet, Take 5 mg by mouth Every Morning., Disp: , Rfl:   •  furosemide (LASIX) 20 MG tablet, Take 20 mg by mouth Every Morning., Disp: , Rfl:   •  gabapentin (NEURONTIN) 400 MG capsule, Take 400 mg by mouth 3 (Three) Times a Day., Disp: , Rfl:   •  lisinopril (PRINIVIL,ZESTRIL) 20 MG tablet, Take 20 mg by mouth Every Morning. 1/2 tab, Disp: , Rfl:   •  mupirocin (BACTROBAN) 2 % ointment, Apply 1 application topically 3  (Three) Times a Day. Pre op, Disp: , Rfl:   •  Omega-3 Fatty Acids (FISH OIL) 1000 MG capsule capsule, Take  by mouth Daily With Breakfast., Disp: , Rfl:   •  omeprazole (priLOSEC) 20 MG capsule, Take 20 mg by mouth Every Evening., Disp: , Rfl:   •  simvastatin (ZOCOR) 20 MG tablet, Take 20 mg by mouth Every Night., Disp: , Rfl:   •  vitamin B-12 (CYANOCOBALAMIN) 500 MCG tablet, Take 500 mcg by mouth Daily., Disp: , Rfl:     Past Medical History:   Diagnosis Date   • Arthritis    • BPH (benign prostatic hyperplasia)    • GERD (gastroesophageal reflux disease)    • High cholesterol    • History of hepatitis C     treated medically   • History of kidney stones    • Hypertension    • Sleep apnea           Past Surgical History:   Procedure Laterality Date   • ANKLE SURGERY Right     orif   • CARPAL TUNNEL RELEASE Left    • GASTRIC BYPASS      2008   • HAND SURGERY Right     thumb    • JOINT REPLACEMENT      lt knee          Social History     Occupational History   • Not on file.     Social History Main Topics   • Smoking status: Former Smoker     Types: Cigarettes     Quit date: 1995   • Smokeless tobacco: Never Used   • Alcohol use No   • Drug use: No   • Sexual activity: Not on file      Social History     Social History Narrative          Family History   Problem Relation Age of Onset   • Malig Hyperthermia Neg Hx        Review of Systems:     Constitutional:  Denies fever, shaking or chills   Eyes:  Denies change in visual acuity   HEENT:  Denies nasal congestion or sore throat   Respiratory:  Denies cough or shortness of breath   Cardiovascular:  Denies chest pain or edema   GI:  Denies abdominal pain, nausea, vomiting, bloody stools or diarrhea   Musculoskeletal:  Denies numbness tingling or loss of motor function except as outlined above in history of present illness.  Integument:  Denies rash, lesion or ulceration   Neurologic:  Denies headache or focal weakness, deficits      All other pertinent positives and  negatives as noted above in HPI.    Physical Exam: 72 y.o. male  General:  Patient is awake and alert.  Appears in no acute distress or discomfort.    Psych:  Affect and demeanor are appropriate.    Eyes:  Conjunctiva and sclera appear grossly normal.  Eyes track well and EOM seem to be intact.    Ears:  No gross abnormalities.  Hearing adequate for the exam.    Cardiovascular:  Regular rate and rhythm.    Lungs:  Good chest expansion.  Breathing unlabored.    Lymph:  No palpable adenopathy about neck or axilla.    Neck:  Supple.  Normal ROM.  Negative Spurling's for shoulder or arm pain.    Right upper extremity:  Skin benign and intact without evidence for swelling, masses or atrophy.  No palpable masses.  No focal areas of exquisite tenderness.  Shoulder ROM limited and uncomfortable.  No evident instability or apprehension.  Hornblowers negative.  ER lag sign positive.  Good strength in deltoid, wrist and hand.  Intact sensation in arm, hand.  Palpable radial pulse with brisk cap refill.    Diagnostic Tests:  Lab Results   Component Value Date    GLUCOSE 128 (H) 01/10/2018    CALCIUM 8.9 01/10/2018     01/10/2018    K 4.5 01/10/2018    CO2 28.2 01/10/2018     01/10/2018    BUN 20 01/10/2018    CREATININE 1.06 01/10/2018    EGFRIFAFRI 83 01/10/2018    EGFRIFNONA 69 01/10/2018    BCR 18.9 01/10/2018    ANIONGAP 10.8 01/10/2018     Lab Results   Component Value Date    WBC 6.24 01/10/2018    HGB 11.9 (L) 01/10/2018    HCT 37.6 (L) 01/10/2018    MCV 97.4 (H) 01/10/2018     01/10/2018     No results found for: INR, PROTIME     Imaging: AP and scapular Y views the right shoulder are ordered and reviewed.  These are compared to his previous x-rays.  He has what appears to be severe rotator cuff tear arthropathy with an absent acromiohumeral interval and marked arthrosis.    Assessment:  Right shoulder rotator cuff tear arthropathy    Plan: We had a thorough discussion regarding the risks, benefits  and alternatives to an arthroplasty and non-surgical management versus surgery.  I explained that surgical risks include infection, hematoma, hardware related complications including failure of fixation, loosening, fracture, persistent pain and/or loss of motion, iatrogenic nerve and/or blood vessel injury resulting in permanent weakness, numbness or dysfunction, DVT, PE, positioning related neuropraxia, and anesthesia related complications resulting in death.  We discussed the indication for a reverse as opposed to a standard total shoulder and the risks inherent to the reverse including notching, glenoid loosening, instability, and traction related neuropraxia, any of which could result in persistent pain or problems requiring further surgery.  Lastly, we discussed the rehab and all that will be expected of the patient post operatively to ensure an optimal outcome.  The patient voiced understanding of the risks, benefits, and alternative forms of treatment that were discussed and the patient consents to proceed with the reverse arthroplasty.        Date: 1/14/2018    Alex Ordaz MD

## 2018-01-18 NOTE — ANESTHESIA PROCEDURE NOTES
Airway  Urgency: elective    Airway not difficult    General Information and Staff    Patient location during procedure: OR  CRNA: AISHA HALL    Indications and Patient Condition  Indications for airway management: airway protection    Preoxygenated: yes  Mask difficulty assessment: 1 - vent by mask    Final Airway Details  Final airway type: endotracheal airway      Successful airway: ETT  Cuffed: yes   Successful intubation technique: direct laryngoscopy  Endotracheal tube insertion site: oral  Blade: Walter  Blade size: #4  ETT size: 7.5 mm  Cormack-Lehane Classification: grade I - full view of glottis  Placement verified by: chest auscultation and capnometry   Measured from: lips  ETT to lips (cm): 23  Number of attempts at approach: 1    Additional Comments   ett cuff up at MOP

## 2018-01-18 NOTE — ANESTHESIA PREPROCEDURE EVALUATION
Anesthesia Evaluation     Patient summary reviewed and Nursing notes reviewed   NPO Solid Status: > 8 hours  NPO Liquid Status: > 2 hours     Airway   Mallampati: II  Dental    (+) lower dentures and upper dentures    Pulmonary - normal exam    breath sounds clear to auscultation  (+) sleep apnea on CPAP,   Cardiovascular - normal exam    ECG reviewed    (+) hypertension well controlled, hyperlipidemia      Neuro/Psych- negative ROS  GI/Hepatic/Renal/Endo    (+) obesity,  GERD, hepatitis C,     Musculoskeletal     Abdominal   (+) obese,    Substance History - negative use     OB/GYN          Other   (+) arthritis                                         Anesthesia Plan    ASA 3     general     intravenous induction   Anesthetic plan and risks discussed with patient.

## 2018-01-18 NOTE — PLAN OF CARE
Problem: Patient Care Overview (Adult)  Goal: Plan of Care Review  Outcome: Ongoing (interventions implemented as appropriate)   01/18/18 1200   Coping/Psychosocial Response Interventions   Plan Of Care Reviewed With patient   Patient Care Overview   Progress no change     Goal: Adult Individualization and Mutuality  Outcome: Ongoing (interventions implemented as appropriate)    Goal: Discharge Needs Assessment  Outcome: Ongoing (interventions implemented as appropriate)      Problem: Perioperative Period (Adult)  Goal: Signs and Symptoms of Listed Potential Problems Will be Absent or Manageable (Perioperative Period)  Outcome: Ongoing (interventions implemented as appropriate)   01/18/18 1200   Perioperative Period   Problems Assessed (Perioperative Period) pain   Problems Present (Perioperative Period) pain

## 2018-01-18 NOTE — BRIEF OP NOTE
TOTAL SHOULDER REVERSE ARTHROPLASTY  Progress Note    Jermaine Denis  1/18/2018    Pre-op Diagnosis:   Rotator cuff tear arthropathy, right [M12.811]       Post-Op Diagnosis Codes:     * Rotator cuff tear arthropathy, right [M12.811]    Procedure/CPT® Codes:      Procedure(s):  Reverse Total Shoulder Arthroplasty, Biceps tenodesis    Surgeon(s):  Alex Ordaz MD    Anesthesia: General with Block    Staff:   Circulator: Tasha Beckwith RN  Scrub Person: Lindsey Dewey  Vendor Representative: Bebeto Fajardo  Assistant: Gabe Ordonez CSA  [place]: Dejuan Carranza RN    Estimated Blood Loss: 100ml    Urine Voided: * No values recorded between 1/18/2018  2:20 PM and 1/18/2018  3:52 PM *    Specimens:                None      Drains:   Drain/Device Site 01/18/18 1535 Right lateral shoulder collapsible closed device 1 (Active)           Findings: see dictation    Complications: none      Alex Ordaz MD     Date: 1/18/2018  Time: 3:52 PM

## 2018-01-18 NOTE — OP NOTE
Orthopaedic Operative Note    Facility: [facility]    Patient: Jermaine Denis    Medical Record Number: [id number]    YOB: 1945    Dictating Surgeon: Alex Ordaz M.D.    Primary Care Physician: Linh Mckeon MD    Date of Operation:  1/18/2018    PREOPERATIVE DIAGNOSIS:  Right shoulder rotator cuff tear arthropathy         POSTOPERATIVE DIAGNOSIS: Right shoulder rotator cuff tear arthropathy         PROCEDURES PERFORMED:   1.  Right reverse total shoulder arthroplasty     2. Tenodesis of the long head of the biceps tendon.       SURGEON: Aelx Ordaz MD      ASSISTANT: Gabe Ordonez (First Assist)      ANESTHESIA:    Regional followed by General.          COMPLICATIONS: None.       SPECIMEN: None.       ESTIMATED BLOOD LOSS: Less than 100 mL.       IMPLANTS:    1. Biomet 11 mm mini comprehensive humeral stem with 44 plus 5 mm tray and 36 standard   humeral bearing.    2. Size 25 mm mini glenoid baseplate with one 6.5 mm central compression screw   and 4 peripheral 4.75 mm locking screws.    3. Size 36 mm Glenosphere.      INDICATIONS: The patient has a long history of shoulder pain and weakness   which has been nonresponse to conservative treatment.  I explained that surgical risks include infection, hematoma, hardware related complications including failure of fixation, loosening, fracture, persistent pain and/or loss of motion, iatrogenic nerve and/or blood vessel injury resulting in permanent weakness, numbness or dysfunction, DVT, PE, positioning related neuropraxia, and anesthesia related complications resulting in death.  We discussed the indication for a reverse as opposed to a standard total shoulder and the risks inherent to the reverse including notching, glenoid loosening, instability, and traction related neuropraxia, any of which could result in persistent pain or problems requiring further surgery.                        DESCRIPTION OF PROCEDURE: The patient and  operative site were identified in the   preoperative holding area. The surgical site was marked. Following this, adequate   regional anesthesia of the right upper extremity was administered. The patient   was then taken to the operating room and placed in the supine position.   Adequate general anesthesia was administered and then the patient was repositioned on   the operating table in the modified beach chair position. The head and neck were   placed in neutral alignment and all bony prominences were carefully padded and   protected. Once we had appropriate position, a timeout was taken, preoperative   antibiotics were administered. The extremity was cleaned with an alcohol   solution. A Hibiclens scrub was performed and then the extremity was prepped   with 2 ChloraPreps. I allowed this to dry for 3 minutes before the draping   procedure was carried out. Again, a timeout had been taken and preoperative   antibiotics administered prior to incision.     I fashioned an approximately 6 cm incision over the anterior aspect of the shoulder,   carried that down through the skin and subcutaneous tissues. Full-thickness medial and   lateral skin flaps were developed. The cephalic vein was dissected out and this structure   was retracted laterally. This was kept protected throughout the duration of the   case. The underlying clavipectoral fascia was divided. The deltoid muscle was   retracted laterally and the strap muscles retracted medially. The long head of   the biceps was dissected out of the groove. This was released off of its   attachment to the supraglenoid tubercle. The diseased intra-articular portion   of the biceps was removed and then the long head of the biceps was tenodesed to   the pectoralis tendon using multiple MaxBraid sutures.        The patient had a large, retracted rotator cuff tear involving the supraspinatus and infraspinatus along with the upper subscapularis.  There was also significant  arthrosis of the visible   portions of the humeral head.       The remaining subscapularis was released off the attachment to   the lesser tuberosity and the humeral head exposed. The periarticular   osteophytes were carefully removed with a rongeur. I then inserted the cutting   guide. This was pinned into position, set to 20 degrees of retroversion as referenced   off the forearm. The head cut was carried out in typical fashion and the cut   portion of bone removed.     Once I completed that process, I directed my attention   to the glenoid. The axillary nerve was dissected out and this structure was   identified and protected. Once I had that structure protected, the anterior,   posterior, and inferior glenoid retractors were carefully positioned. Superior,   anterior, and inferior glenoid labral tissue was carefully removed to allow for   exposure of the caudal rim of the glenoid to ensure correct positioning of the   baseplate.     The centering guide for the baseplate was inserted, the center pin   was drilled. The glenoid was then reamed and prepared in typical fashion,   careful to maintain appropriate inferior tilt of the component. With the   glenoid sided preparations completed, the baseplate was impacted into position.   It seated well. It was secured with a single central compression screw along   with the 4 peripheral locking screws. The compression screw got great purchase   and the 4 locking screws were confirmed to lock very nicely into the plate. The   baseplate seemed to be well fixed.     I trialed with a 36 Glenosphere. It fit well.   I did have to adjust the offset to allow for adequate   inferior overhang to hopefully minimize the risk of notching. The trial   component was removed, final component impacted into position. It seated well.   I confirmed that was well seated by pulling up on it circumferentially with a   right angle clamp. When doing so, the entire scapula seemed to move. With  the   baseplate and glenoid well seated, I then directed my attention to the humerus.     The humerus was reamed and broached up to an 11 which fit well. The trial   component was removed, final component impacted into position, taking care to   maintain appropriate retroversion as referenced off the forearm. I trialed off   of the stem then placed the final implants. The plus 5 mm fit the best and restored excellent    motion and stability. There was no impingement and I did check the stability in multiple   positions of internal and external rotation with axial loading.  Once again,    the components were confirmed to be stable, and again, the shoulder   demonstrated excellent motion.       The wound was irrigated out with 500 ccs of a betadine-containing   saline solution. I then irrigated with 3 L of sterile saline mixed with bacitracin via    pulsatile lavage. I made sure that we had good hemostasis. The wound was then   sequentially closed in layers using interrupted Vicryl for the deep tissues and a running   subcuticular Monocryl stitch for the skin followed by Dermabond. Sterile   dressings were applied to the wound and the drapes withdrawn. The arm was   placed in a shoulder immobilizer. The patient was awakened, transferred to a   standard bed, and taken to the recovery room. The patient tolerated the procedure well.   There were no complications. The patient was taken to the recovery room in good   condition.          Alex Ordaz M.D.*   1/18/2018     cc: Linh Mckeon MD

## 2018-01-18 NOTE — ANESTHESIA PROCEDURE NOTES
Peripheral Block    Patient location during procedure: holding area  Start time: 1/18/2018 2:00 PM  Stop time: 1/18/2018 2:09 PM  Reason for block: at surgeon's request and post-op pain management  Performed by  Anesthesiologist: GALDINO TORRES  Preanesthetic Checklist  Completed: patient identified, site marked, surgical consent, pre-op evaluation, timeout performed, IV checked, risks and benefits discussed and monitors and equipment checked  Prep:  Sterile barriers:gloves  Prep: ChloraPrep  Patient monitoring: continuous pulse oximetry, blood pressure monitoring and EKG  Procedure  Sedation:yes  Performed under: PNBImages:still images obtained    Laterality:right  Block Type:interscalene  Needle Gauge:20 G    Catheter Size:20 G  Medications  Depomedrol:40 mg  Comment:With additional 6 cc of 2 % lidocaine with 1:200,000 epi  Local Injected:ropivacaine 0.5% Local Amount Injected:30mL  Post Assessment  Injection Assessment: negative aspiration for heme, no paresthesia on injection and incremental injection  Patient Tolerance:comfortable throughout block  Complications:no

## 2018-01-19 VITALS
TEMPERATURE: 97.9 F | SYSTOLIC BLOOD PRESSURE: 103 MMHG | DIASTOLIC BLOOD PRESSURE: 67 MMHG | BODY MASS INDEX: 39.51 KG/M2 | OXYGEN SATURATION: 91 % | WEIGHT: 223 LBS | RESPIRATION RATE: 16 BRPM | HEIGHT: 63 IN | HEART RATE: 78 BPM

## 2018-01-19 LAB
HCT VFR BLD AUTO: 31.6 % (ref 40.4–52.2)
HGB BLD-MCNC: 10.2 G/DL (ref 13.7–17.6)

## 2018-01-19 PROCEDURE — 85014 HEMATOCRIT: CPT | Performed by: ORTHOPAEDIC SURGERY

## 2018-01-19 PROCEDURE — 25010000003 CEFAZOLIN IN DEXTROSE 2-4 GM/100ML-% SOLUTION: Performed by: ORTHOPAEDIC SURGERY

## 2018-01-19 PROCEDURE — 25010000002 VANCOMYCIN: Performed by: ORTHOPAEDIC SURGERY

## 2018-01-19 PROCEDURE — 85018 HEMOGLOBIN: CPT | Performed by: ORTHOPAEDIC SURGERY

## 2018-01-19 RX ORDER — PSEUDOEPHEDRINE HCL 30 MG
100 TABLET ORAL 2 TIMES DAILY PRN
Start: 2018-01-19 | End: 2018-06-21

## 2018-01-19 RX ORDER — OXYCODONE AND ACETAMINOPHEN 7.5; 325 MG/1; MG/1
TABLET ORAL
Qty: 60 TABLET | Refills: 0
Start: 2018-01-19 | End: 2018-03-09

## 2018-01-19 RX ADMIN — GABAPENTIN 800 MG: 400 CAPSULE ORAL at 08:46

## 2018-01-19 RX ADMIN — MUPIROCIN: 20 OINTMENT TOPICAL at 08:48

## 2018-01-19 RX ADMIN — CEFAZOLIN SODIUM 2 G: 2 INJECTION, SOLUTION INTRAVENOUS at 06:12

## 2018-01-19 RX ADMIN — OXYCODONE HYDROCHLORIDE AND ACETAMINOPHEN 2 TABLET: 7.5; 325 TABLET ORAL at 12:54

## 2018-01-19 RX ADMIN — PANTOPRAZOLE SODIUM 40 MG: 40 TABLET, DELAYED RELEASE ORAL at 06:12

## 2018-01-19 RX ADMIN — OXYCODONE HYDROCHLORIDE AND ACETAMINOPHEN 2 TABLET: 7.5; 325 TABLET ORAL at 08:46

## 2018-01-19 RX ADMIN — FINASTERIDE 5 MG: 5 TABLET, FILM COATED ORAL at 08:46

## 2018-01-19 RX ADMIN — FERROUS SULFATE TAB 325 MG (65 MG ELEMENTAL FE) 325 MG: 325 (65 FE) TAB at 08:46

## 2018-01-19 RX ADMIN — ATORVASTATIN CALCIUM 10 MG: 10 TABLET, FILM COATED ORAL at 08:46

## 2018-01-19 NOTE — PLAN OF CARE
Problem: Patient Care Overview (Adult)  Goal: Plan of Care Review  Outcome: Outcome(s) achieved Date Met: 01/19/18 01/19/18 1554   Coping/Psychosocial Response Interventions   Plan Of Care Reviewed With patient   Patient Care Overview   Progress improving   Outcome Evaluation   Outcome Summary/Follow up Plan Patient ambulating well with stand by assist. Pain is well controlled with po meds. Vitals are stable and voiding function is intact. Patient educated on bp monitoring and meds managment. Patient and family feel prepared and ready for d/c home with hh.      Goal: Adult Individualization and Mutuality  Outcome: Outcome(s) achieved Date Met: 01/19/18 01/18/18 2103   Individualization   Patient Specific Goals Good pain control    Patient Specific Interventions offer prn pain meds when available and assist with ADLs     Goal: Discharge Needs Assessment  Outcome: Outcome(s) achieved Date Met: 01/19/18 01/19/18 1347 01/19/18 1554   Discharge Needs Assessment   Discharge Disposition --  home or self-care   Living Environment   Transportation Available car;family or friend will provide --        Problem: Perioperative Period (Adult)  Goal: Signs and Symptoms of Listed Potential Problems Will be Absent or Manageable (Perioperative Period)  Outcome: Outcome(s) achieved Date Met: 01/19/18 01/19/18 1554   Perioperative Period   Problems Assessed (Perioperative Period) all   Problems Present (Perioperative Period) pain       Problem: Fall Risk (Adult)  Goal: Absence of Falls  Outcome: Outcome(s) achieved Date Met: 01/19/18 01/19/18 1554   Fall Risk (Adult)   Absence of Falls achieves outcome       Problem: Pain, Acute (Adult)  Goal: Acceptable Pain Control/Comfort Level  Outcome: Outcome(s) achieved Date Met: 01/19/18 01/19/18 1554   Pain, Acute (Adult)   Acceptable Pain Control/Comfort Level achieves outcome

## 2018-01-19 NOTE — PROGRESS NOTES
Continued Stay Note  [facility]     Patient Name: Jermaine Denis  MRN: [id number]  Today's Date: 1/19/2018    Admit Date: 1/18/2018          Discharge Plan       01/19/18 1350    Case Management/Social Work Plan    Plan Plans home with wife's assist    Patient/Family In Agreement With Plan yes    Additional Comments Confirmed face sheet correct. [facility] ([facility]).               Discharge Codes     None        Expected Discharge Date and Time     Expected Discharge Date Expected Discharge Time    Jan 19, 2018             Frank Burnett RN  Discharge Planning Assessment  [facility]     Patient Name: Jermaine Denis  MRN: [id number]  Today's Date: 1/19/2018    Admit Date: 1/18/2018          Discharge Needs Assessment       01/19/18 1347    Living Environment    Lives With spouse    Living Arrangements mobile home    Home Accessibility no concerns;stairs to enter home    Number of Stairs to Enter Home 3    Type of Financial/Environmental Concern none    Transportation Available car;family or friend will provide    Living Environment    Provides Primary Care For no one    Quality Of Family Relationships supportive;helpful;involved    Able to Return to Prior Living Arrangements yes    Discharge Needs Assessment    Concerns To Be Addressed basic needs concerns;denies needs/concerns at this time;discharge planning concerns;home safety concerns    Concerns Comments Patient and wife say no needs. [facility]  [facility]     Outpatient/Agency/Support Group Needs other (see comments)   [facility] except [facility] not [facility].    [facility] - [facility], no [facility]    Anticipated Changes Related to Illness none    Equipment Currently Used at Home bipap/ cpap;other (see comments);grab bar;shower chair   Patient says he has a cane, walker at home from previous knee surgery to use if needed.     Equipment Needed After Discharge sling   sling in on arm.    Current Discharge  Risk physical impairment;dependent with mobility/activities of daily living    Discharge Disposition still a patient    Discharge Contact Information if Applicable Liz Denis wife [phone]    Discharge Planning Comments Plans home with wife's assist            Discharge Plan       01/19/18 1350    Case Management/Social Work Plan    Plan Plans home with wife's assist    Patient/Family In Agreement With Plan yes    Additional Comments Confirmed face sheet correct. [facility] ([facility]).         Discharge Placement     No information found        Expected Discharge Date and Time     Expected Discharge Date Expected Discharge Time    Jan 19, 2018               Demographic Summary       01/19/18 1344    Referral Information    Admission Type inpatient    Arrived From admitted as an inpatient;home or self-care    Referral Source admission list    Reason For Consult discharge planning    Record Reviewed clinical discipline documentation;history and physical;medical record    Contact Information    Permission Granted to Share Information With ;family/designee    Comments wife Liz    Primary Care Physician Information    Name [facility]ahona [facility]            Functional Status       01/19/18 1345    Functional Status Current    Ambulation 2-->assistive person    Transferring 2-->assistive person    Toileting 2-->assistive person    Bathing 2-->assistive person    Dressing 2-->assistive person    Eating 0-->independent    Communication 0-->understands/communicates without difficulty    Swallowing (if score 2 or more for any item, consult Rehab Services) 0-->swallows foods/liquids without difficulty    Change in Functional Status Since Onset of Current Illness/Injury yes    Functional Status Prior    Ambulation 0-->independent    Transferring 0-->independent    Toileting 0-->independent    Bathing 0-->independent    Dressing 0-->independent    Eating 0-->independent    Communication 0-->understands/communicates  without difficulty    Swallowing 0-->swallows foods/liquids without difficulty    Activity Tolerance    Usual Activity Tolerance good    Current Activity Tolerance moderate            Psychosocial     None            Abuse/Neglect     None            Legal       01/19/18 1346    Legal    Legal Considerations patient/family ability to make health care decisions;patient/family management of healthcare needs;patient/ for healthcare needs;other (see comments);patient/family capacity to make sound judgments    Legal Concerns Patient says [facility] requested. He says his wife Liz is his decision maker if he is not able.            Substance Abuse     None            Patient Forms     None          Frank Burnett, CHILO

## 2018-01-19 NOTE — PROGRESS NOTES
Case Management Discharge Note    Final Note: Home    Discharge Placement     No information found        Other: Other (per car)    Discharge Codes: 01  Discharge to home

## 2018-01-19 NOTE — PAYOR COMM NOTE
"Yenni Denis (72 y.o. Male)     Date of Birth Social Security Number Address Home Phone MRN    1945  [address] [id number] [phone] [id number]    [employer] Marital Status          Oriental orthodox        Admission Date Admission Type Admitting Provider Attending Provider Department, Room/Bed    1/18/18 Elective Alex Ordaz MD  [address], P790/1    Discharge Date Discharge Disposition Discharge Destination        1/19/2018 Home or Self Care Home            Attending Provider: (none)    Allergies:  No Known Allergies    Isolation:  None   Infection:  None   Code Status:  FULL    Ht:  160 cm (63\")   Wt:  101 kg (223 lb)    Admission Cmt:  None   Principal Problem:  Rotator cuff tear arthropathy, right [M12.811] More...                 Active Insurance as of 1/18/2018     Primary Coverage     Payor Plan Insurance Group Employer/Plan Group    [employer] [id number]      Payor Plan Address Payor Plan Phone Number Effective From Effective To    [address] [phone] 12/1/2016     [place] [address]       Subscriber Name Subscriber Birth Date Member ID       YENNI DENIS 1945 [id number]                 Emergency Contacts      (Rel.) Home Phone Work Phone Mobile Phone    Liz Denis (Spouse) [phone] -- [phone]               Discharge Summary      Alex Ordaz MD at 1/19/2018  9:03 AM          Date of Admission:  1/18/2018    Date of Discharge:  1/19/2018    Discharge Diagnosis: s/p Right reverse total shoulder arthroplasty    Admitting Physician: Alex Ordaz    Consults: none    DETAILS OF HOSPITAL STAY:  Patient is a 72 y.o. male was admitted to the floor following a reverse total shoulder arthroplasty.  Post-operatively the patient was transferred to the post-operative floor where the patient underwent physical therapy including both active and passive ROM exercises. Opioids were titrated to achieve " appropriate pain management to allow for participation in mobilization exercises. Vital signs are now stable. The incision is benign without signs or symptoms of infection. Operative extremity neurovascular status remains intact. Appropriate education re: incision care, activity levels, medications, and follow up visits was completed and all questions were answered. The patient is now deemed stable for discharge to home.    Condition on Discharge:  Stable    Discharge Medications   Jermaine Denis   Home Medication Instructions [place]:[id number]    Printed on:01/19/18 0903   Medication Information                      amitriptyline (ELAVIL) 25 MG tablet  Take 25 mg by mouth Every Night.             aspirin 81 MG EC tablet  Take 81 mg by mouth Every Morning. Stopped 1/11/18             calcium citrate-vitamin d (CALCITRATE) 315-250 MG-UNIT tablet tablet  Take 2 tablets by mouth 2 (Two) Times a Day.             cephalexin (KEFLEX) 500 MG capsule  Take 500 mg by mouth Every Morning.             cetirizine (zyrTEC) 10 MG tablet  Take 10 mg by mouth Daily As Needed.             Chlorhexidine Gluconate 2 % pads  Apply 1 application topically See Admin Instructions. AS DIRECTED DAY BEFORE & DAY OF OR             cholecalciferol (VITAMIN D3) 400 units tablet  Take 400 Units by mouth 2 (Two) Times a Day. 2 tabs  Each dose             ferrous sulfate 325 (65 FE) MG tablet  Take 325 mg by mouth 2 (Two) Times a Day.             finasteride (PROSCAR) 5 MG tablet  Take 5 mg by mouth Every Morning.             furosemide (LASIX) 20 MG tablet  Take 20 mg by mouth Every Morning.             gabapentin (NEURONTIN) 400 MG capsule  Take 800 mg by mouth 3 (Three) Times a Day.             lisinopril (PRINIVIL,ZESTRIL) 20 MG tablet  Take 10 mg by mouth Every Morning. 1/2 tab             mupirocin (BACTROBAN) 2 % ointment  Apply 1 application topically See Admin Instructions. AS DIRECTED DAY BEFORE & DAY OF OR             Omega-3 Fatty Acids  (FISH OIL) 1000 MG capsule capsule  Take 1,000 mg by mouth Daily With Breakfast.             omeprazole (priLOSEC) 20 MG capsule  Take 20 mg by mouth Every Evening.             simvastatin (ZOCOR) 20 MG tablet  Take 10 mg by mouth Every Night.             vitamin B-12 (CYANOCOBALAMIN) 500 MCG tablet  Take 500 mcg by mouth See Admin Instructions. TWICE WEEKLY ON Monday & FRIDAY                 Discharge Diet: regular    Activity at Discharge: as tolerated    Discharge Instructions:   1)  Patient is to continue with physical therapy exercises daily and continue working with the physical therapist as ordered.  2)  Continue to follow precautions as instructed.   3)  Patient is instructed to continue use of the sling except when performing their physical therapy exercising and while dressing or showering.  4)  Continue BRANDEN hose daily and ice regularly.  Patient also instructed on incentive spirometer during hospitalization and encouraged to continue to use at home regularly.   5)  The dressing should be left in place. If waterproof dressing is intact the patient may shower immediately following discharge. If the dressing becomes disloged or saturated it should be changed and patient must wait until POD #5 to shower. If dressing is changed, apply dry sterile dressing after showering.  6)  Follow up appointment in 2 weeks - patient to call the office at [phone] to schedule.     Follow-up Appointments  2 weeks with Dr Sushma Ordaz MD  01/19/18  9:03 AM     Electronically signed by Alex Ordaz MD at 1/19/2018  9:03 AM

## 2018-01-19 NOTE — DISCHARGE SUMMARY
Date of Admission:  1/18/2018    Date of Discharge:  1/19/2018    Discharge Diagnosis: s/p Right reverse total shoulder arthroplasty    Admitting Physician: Alex Ordaz    Consults: none    DETAILS OF HOSPITAL STAY:  Patient is a 72 y.o. male was admitted to the floor following a reverse total shoulder arthroplasty.  Post-operatively the patient was transferred to the post-operative floor where the patient underwent physical therapy including both active and passive ROM exercises. Opioids were titrated to achieve appropriate pain management to allow for participation in mobilization exercises. Vital signs are now stable. The incision is benign without signs or symptoms of infection. Operative extremity neurovascular status remains intact. Appropriate education re: incision care, activity levels, medications, and follow up visits was completed and all questions were answered. The patient is now deemed stable for discharge to home.    Condition on Discharge:  Stable    Discharge Medications   Jermaine Denis   Home Medication Instructions [place]:[id number]    Printed on:01/19/18 0903   Medication Information                      amitriptyline (ELAVIL) 25 MG tablet  Take 25 mg by mouth Every Night.             aspirin 81 MG EC tablet  Take 81 mg by mouth Every Morning. Stopped 1/11/18             calcium citrate-vitamin d (CALCITRATE) 315-250 MG-UNIT tablet tablet  Take 2 tablets by mouth 2 (Two) Times a Day.             cephalexin (KEFLEX) 500 MG capsule  Take 500 mg by mouth Every Morning.             cetirizine (zyrTEC) 10 MG tablet  Take 10 mg by mouth Daily As Needed.             Chlorhexidine Gluconate 2 % pads  Apply 1 application topically See Admin Instructions. AS DIRECTED DAY BEFORE & DAY OF OR             cholecalciferol (VITAMIN D3) 400 units tablet  Take 400 Units by mouth 2 (Two) Times a Day. 2 tabs  Each dose             ferrous sulfate 325 (65 FE) MG tablet  Take 325 mg by mouth 2 (Two) Times a Day.              finasteride (PROSCAR) 5 MG tablet  Take 5 mg by mouth Every Morning.             furosemide (LASIX) 20 MG tablet  Take 20 mg by mouth Every Morning.             gabapentin (NEURONTIN) 400 MG capsule  Take 800 mg by mouth 3 (Three) Times a Day.             lisinopril (PRINIVIL,ZESTRIL) 20 MG tablet  Take 10 mg by mouth Every Morning. 1/2 tab             mupirocin (BACTROBAN) 2 % ointment  Apply 1 application topically See Admin Instructions. AS DIRECTED DAY BEFORE & DAY OF OR             Omega-3 Fatty Acids (FISH OIL) 1000 MG capsule capsule  Take 1,000 mg by mouth Daily With Breakfast.             omeprazole (priLOSEC) 20 MG capsule  Take 20 mg by mouth Every Evening.             simvastatin (ZOCOR) 20 MG tablet  Take 10 mg by mouth Every Night.             vitamin B-12 (CYANOCOBALAMIN) 500 MCG tablet  Take 500 mcg by mouth See Admin Instructions. TWICE WEEKLY ON Monday & FRIDAY                 Discharge Diet: regular    Activity at Discharge: as tolerated    Discharge Instructions:   1)  Patient is to continue with physical therapy exercises daily and continue working with the physical therapist as ordered.  2)  Continue to follow precautions as instructed.   3)  Patient is instructed to continue use of the sling except when performing their physical therapy exercising and while dressing or showering.  4)  Continue BRANDEN hose daily and ice regularly.  Patient also instructed on incentive spirometer during hospitalization and encouraged to continue to use at home regularly.   5)  The dressing should be left in place. If waterproof dressing is intact the patient may shower immediately following discharge. If the dressing becomes disloged or saturated it should be changed and patient must wait until POD #5 to shower. If dressing is changed, apply dry sterile dressing after showering.  6)  Follow up appointment in 2 weeks - patient to call the office at [phone] to schedule.     Follow-up Appointments  2 weeks  with Dr Sushma Ordaz MD  01/19/18  9:03 AM

## 2018-01-19 NOTE — PLAN OF CARE
Problem: Patient Care Overview (Adult)  Goal: Plan of Care Review  Outcome: Ongoing (interventions implemented as appropriate)   01/18/18 2103   Coping/Psychosocial Response Interventions   Plan Of Care Reviewed With patient   Patient Care Overview   Progress improving   Outcome Evaluation   Outcome Summary/Follow up Plan VSS, numbness still present with weak , pt voided, pt states feels well, educated pt on bp monitoring and bp meds     Goal: Adult Individualization and Mutuality  Outcome: Ongoing (interventions implemented as appropriate)      Problem: Perioperative Period (Adult)  Goal: Signs and Symptoms of Listed Potential Problems Will be Absent or Manageable (Perioperative Period)  Outcome: Ongoing (interventions implemented as appropriate)      Problem: Fall Risk (Adult)  Goal: Identify Related Risk Factors and Signs and Symptoms  Outcome: Outcome(s) achieved Date Met: 01/18/18    Goal: Absence of Falls  Outcome: Ongoing (interventions implemented as appropriate)      Problem: Pain, Acute (Adult)  Goal: Identify Related Risk Factors and Signs and Symptoms  Outcome: Outcome(s) achieved Date Met: 01/18/18    Goal: Acceptable Pain Control/Comfort Level  Outcome: Ongoing (interventions implemented as appropriate)

## 2018-01-19 NOTE — PLAN OF CARE
Problem: Patient Care Overview (Adult)  Goal: Plan of Care Review   01/19/18 0831   Coping/Psychosocial Response Interventions   Plan Of Care Reviewed With patient   Outcome Evaluation   Outcome Summary/Follow up Plan Patient is a pleasant 72 y.o. male s/p R reverse TSA. Reviewed [employer] of UE. Handout and demonstration provided regarding shoulder exercise protocol. Instructed to perform exercises 10x, 5-6 times/day. Patient verbalized understanding and did not have any further questions.

## 2018-02-09 ENCOUNTER — OFFICE VISIT (OUTPATIENT)
Dept: ORTHOPEDIC SURGERY | Facility: CLINIC | Age: 73
End: 2018-02-09

## 2018-02-09 VITALS — BODY MASS INDEX: 38.07 KG/M2 | WEIGHT: 223 LBS | TEMPERATURE: 98.5 F | HEIGHT: 64 IN

## 2018-02-09 DIAGNOSIS — Z09 SURGERY FOLLOW-UP: Primary | ICD-10-CM

## 2018-02-09 DIAGNOSIS — Z96.611 S/P REVERSE TOTAL SHOULDER ARTHROPLASTY, RIGHT: Primary | ICD-10-CM

## 2018-02-09 DIAGNOSIS — Z09 SURGERY FOLLOW-UP: ICD-10-CM

## 2018-02-09 PROCEDURE — 99024 POSTOP FOLLOW-UP VISIT: CPT | Performed by: ORTHOPAEDIC SURGERY

## 2018-02-09 PROCEDURE — 73030 X-RAY EXAM OF SHOULDER: CPT | Performed by: ORTHOPAEDIC SURGERY

## 2018-02-12 NOTE — PROGRESS NOTES
"Jermaine Denis : 1945 MRN: [id number] DATE: 2018    DIAGNOSIS:  2 week follow up right shoulder arthroplasty      SUBJECTIVE:  Patient returns today for 2 week follow up of right shoulder replacement. Patient reports doing well with no unusual complaints.      OBJECTIVE:    Temp 98.5 °F (36.9 °C)  Ht 162.6 cm (64\")  Wt 101 kg (223 lb)  BMI 38.28 kg/m2    Exam:. The incision is well approximated. No erythema or drainage. Shoulder moves fluidly with pendulums . The arm is soft and nontender.  Good strength in hand and wrist.  Distal sensation intact.  Palpable radial pulse.    DIAGNOSTIC STUDIES    Xrays: 2 views of the right shoulder (AP, scapular Y) were ordered and reviewed for evaluation of shoulder replacement.  They demonstrate a well positioned, well aligned replacement without complicating factors noted.  In comparison with previous films, there has been no change.    ASSESSMENT: 2 week follow up right shoulder replacement.    PLAN:   1.  Begin PT per protocol--prescription given along with 2 copies of my protocol.  2.  Continue sling until next visit.  3.  Counseled patient about appropriate activity modifications and restrictions, including no driving at this point.    Alex Ordaz MD    "

## 2018-02-16 ENCOUNTER — TELEPHONE (OUTPATIENT)
Dept: ORTHOPEDIC SURGERY | Facility: CLINIC | Age: 73
End: 2018-02-16

## 2018-02-17 NOTE — TELEPHONE ENCOUNTER
Duration of therapy will likely be 4-6 months.  He will need therapy 2-3 times weekly.  I have given him a protocol to take to his therapist outlining the specifics of the program.

## 2018-03-09 ENCOUNTER — OFFICE VISIT (OUTPATIENT)
Dept: ORTHOPEDIC SURGERY | Facility: CLINIC | Age: 73
End: 2018-03-09

## 2018-03-09 VITALS — BODY MASS INDEX: 38.98 KG/M2 | WEIGHT: 220 LBS | TEMPERATURE: 97.4 F | HEIGHT: 63 IN

## 2018-03-09 DIAGNOSIS — Z09 SURGERY FOLLOW-UP: ICD-10-CM

## 2018-03-09 DIAGNOSIS — Z96.611 S/P REVERSE TOTAL SHOULDER ARTHROPLASTY, RIGHT: Primary | ICD-10-CM

## 2018-03-09 PROCEDURE — 73030 X-RAY EXAM OF SHOULDER: CPT | Performed by: ORTHOPAEDIC SURGERY

## 2018-03-09 PROCEDURE — 99024 POSTOP FOLLOW-UP VISIT: CPT | Performed by: ORTHOPAEDIC SURGERY

## 2018-03-11 NOTE — PROGRESS NOTES
"Jermaine Denis : 1945 MRN: [id number] DATE: 3/11/2018    DIAGNOSIS: 6 week follow up right shoulder arthroplasty      SUBJECTIVE:  Patient returns today for 6 week follow up of right shoulder replacement. Patient reports doing well with no unusual complaints.  He has not yet started physical therapy but he tells me that he has been doing some exercises on his own.  Of note, he tells me that he has absolutely no pain in the shoulder at this point which he is thrilled about.    OBJECTIVE:    Temp 97.4 °F (36.3 °C) (Temporal Artery )   Ht 160 cm (63\")   Wt 99.8 kg (220 lb)   BMI 38.97 kg/m²     Exam:. The incision is well healed. No erythema or drainage. Shoulder moves fluidly with pendulums.  Motion is excellent for this early stage.  He can elevate to about 150°, externally rotate 30 and internally rotate to his back pocket.  The arm is soft and nontender.  Good motor and sensory function.  Palpable distal pulses.     DIAGNOSTIC STUDIES    Xrays: 2 views of the right shoulder (AP, scapular Y) were ordered and reviewed for evaluation of shoulder replacement. They demonstrate a well positioned, well aligned replacement without complicating factors noted. In comparison with previous films there has been no change.    ASSESSMENT: 6 week follow up right shoulder replacement.    PLAN:   1.  He needs to begin therapy.  He tells me he is scheduled to start that next week.  2.  Discontinue sling and begin working on progressing ROM as tolerated.  3.  Counseled patient about appropriate activity modifications and restrictions--released to drive at this point.    Alex Ordaz MD    "

## 2018-06-01 ENCOUNTER — OFFICE VISIT (OUTPATIENT)
Dept: ORTHOPEDIC SURGERY | Facility: CLINIC | Age: 73
End: 2018-06-01

## 2018-06-01 VITALS — WEIGHT: 220 LBS | HEIGHT: 64 IN | TEMPERATURE: 97.2 F | BODY MASS INDEX: 37.56 KG/M2

## 2018-06-01 DIAGNOSIS — M75.102 LEFT ROTATOR CUFF TEAR ARTHROPATHY: Primary | ICD-10-CM

## 2018-06-01 DIAGNOSIS — M12.812 LEFT ROTATOR CUFF TEAR ARTHROPATHY: Primary | ICD-10-CM

## 2018-06-01 PROCEDURE — 73030 X-RAY EXAM OF SHOULDER: CPT | Performed by: ORTHOPAEDIC SURGERY

## 2018-06-01 PROCEDURE — 99214 OFFICE O/P EST MOD 30 MIN: CPT | Performed by: ORTHOPAEDIC SURGERY

## 2018-06-01 RX ORDER — PREGABALIN 75 MG/1
150 CAPSULE ORAL ONCE
Status: CANCELLED | OUTPATIENT
Start: 2018-06-28 | End: 2018-06-28

## 2018-06-01 RX ORDER — MELOXICAM 15 MG/1
15 TABLET ORAL ONCE
Status: CANCELLED | OUTPATIENT
Start: 2018-06-28 | End: 2018-06-28

## 2018-06-01 RX ORDER — ACETAMINOPHEN 325 MG/1
1000 TABLET ORAL ONCE
Status: CANCELLED | OUTPATIENT
Start: 2018-06-28 | End: 2018-06-28

## 2018-06-05 NOTE — PROGRESS NOTES
Patient: Jermaine Denis    YOB: 1945    Medical Record Number: [id number]    Chief Complaints:  Left shoulder pain    History of Present Illness:     73 y.o. male patient who presents for evaluation of left shoulder.  He reports a long history of worsening pain and dysfunction.  Describes his current pain as severe, constant, and aching.  He has clicking, popping, and grinding of the shoulder.  Symptoms are worse with reaching and lifting.  He struggles to perform activities at or above shoulder level.  [facility]  He reports multiple injections over the years.  Initially, the injections were quite helpful but the last few injections have not helped nearly as much.  He has tried various anti-inflammatories and therapy as well.    Allergies: No Known Allergies    Home Medications:    Current Outpatient Prescriptions:   •  amitriptyline (ELAVIL) 25 MG tablet, Take 25 mg by mouth Every Night., Disp: , Rfl:   •  aspirin 81 MG EC tablet, Take 81 mg by mouth Every Morning. Stopped 1/11/18, Disp: , Rfl:   •  calcium citrate-vitamin d (CALCITRATE) 315-250 MG-UNIT tablet tablet, Take 2 tablets by mouth 2 (Two) Times a Day., Disp: , Rfl:   •  cephalexin (KEFLEX) 500 MG capsule, Take 500 mg by mouth Every Morning., Disp: , Rfl:   •  cetirizine (zyrTEC) 10 MG tablet, Take 10 mg by mouth Daily As Needed., Disp: , Rfl:   •  cholecalciferol (VITAMIN D3) 400 units tablet, Take 400 Units by mouth 2 (Two) Times a Day. 2 tabs  Each dose, Disp: , Rfl:   •  docusate sodium 100 MG capsule, Take 100 mg by mouth 2 (Two) Times a Day As Needed for Constipation., Disp: , Rfl:   •  ferrous sulfate 325 (65 FE) MG tablet, Take 325 mg by mouth 2 (Two) Times a Day., Disp: , Rfl:   •  finasteride (PROSCAR) 5 MG tablet, Take 5 mg by mouth Every Morning., Disp: , Rfl:   •  furosemide (LASIX) 20 MG tablet, Take 20 mg by mouth Every Morning., Disp: , Rfl:   •  gabapentin (NEURONTIN) 400 MG  capsule, Take 800 mg by mouth 3 (Three) Times a Day., Disp: , Rfl:   •  lisinopril (PRINIVIL,ZESTRIL) 20 MG tablet, Take 10 mg by mouth Every Morning. 1/2 tab, Disp: , Rfl:   •  Omega-3 Fatty Acids (FISH OIL) 1000 MG capsule capsule, Take 1,000 mg by mouth Daily With Breakfast., Disp: , Rfl:   •  omeprazole (priLOSEC) 20 MG capsule, Take 20 mg by mouth Every Evening., Disp: , Rfl:   •  simvastatin (ZOCOR) 20 MG tablet, Take 10 mg by mouth Every Night., Disp: , Rfl:   •  vitamin B-12 (CYANOCOBALAMIN) 500 MCG tablet, Take 500 mcg by mouth See Admin Instructions. TWICE WEEKLY ON Monday & FRIDAY, Disp: , Rfl:     Past Medical History:   Diagnosis Date   • Arthritis    • BPH (benign prostatic hyperplasia)    • GERD (gastroesophageal reflux disease)    • High cholesterol    • History of hepatitis C     treated medically   • History of kidney stones    • Hypertension    • Sleep apnea        Past Surgical History:   Procedure Laterality Date   • ANKLE SURGERY Right     orif   • CARPAL TUNNEL RELEASE Left    • GASTRIC BYPASS      2008   • HAND SURGERY Right     thumb    • JOINT REPLACEMENT      lt knee   • TOTAL SHOULDER ARTHROPLASTY W/ DISTAL CLAVICLE EXCISION Right 1/18/2018    Procedure: Reverse Total Shoulder Arthroplasty;  Surgeon: Alex Ordaz MD;  Location: [facility] OR;  Service:        Social History     Occupational History   • Not on file.     Social History Main Topics   • Smoking status: Former Smoker     Types: Cigarettes     Quit date: 1995   • Smokeless tobacco: Never Used   • Alcohol use No   • Drug use: No   • Sexual activity: Not on file      Social History     Social History Narrative   • No narrative on file       Family History   Problem Relation Age of Onset   • Malig Hyperthermia Neg Hx        Review of Systems:      Constitutional: Denies fever, shaking or chills   Eyes: Denies change in visual acuity   HEENT: Denies nasal congestion or sore throat   Respiratory: Denies cough or shortness of  "breath   Cardiovascular: Denies chest pain or edema  Endocrine: Denies tremors, palpitations, intolerance of heat or cold, polyuria, polydipsia.  GI: Denies abdominal pain, nausea, vomiting, bloody stools or diarrhea  : Denies frequency, urgency, incontinence, retention, or nocturia.  Musculoskeletal: Denies numbness tingling or loss of motor function except as above  Integument: Denies rash, lesion or ulceration   Neurologic: Denies headache or focal weakness, deficits  Heme: Denies epistaxis, spontaneous or excessive bleeding, epistaxis, hematuria, melena, fatigue, enlarged or tender lymph nodes.      All other pertinent positives and negatives as noted above in HPI.    Physical Exam: 73 y.o. male  Vitals:    06/01/18 0950   Temp: 97.2 °F (36.2 °C)   TempSrc: Temporal Artery    Weight: 99.8 kg (220 lb)   Height: 162.6 cm (64\")       General:  Patient is awake and alert.  Appears in no acute distress or discomfort.    Psych:  Affect and demeanor are appropriate.    Eyes:  Conjunctiva and sclera appear grossly normal.  Eyes track well and EOM seem to be intact.    Ears:  No gross abnormalities.  Hearing adequate for the exam.    Cardiovascular:  Regular rate and rhythm.    Lungs:  Good chest expansion.  Breathing unlabored.    Extremities: Left shoulder is examined.  Skin is benign.  Trace bursal effusion.  Moderate tenderness anteriorly.  Shoulder motion is limited and uncomfortable.  He has pain and weakness with elevation in the scapular plane and external rotation.  The deltoid fires on exam.  Axillary nerve sensation is intact.  Palpable radial pulse.  Brisk capillary refill.    Imaging:  AP, scapular Y and axillary views left shoulder are ordered and reviewed.  He has what appears to be advanced rotator cuff tear arthropathy with a diminished acromiohumeral interval and marked arthrosis.    Assessment/Plan:  Left rotator cuff tear arthropathy    We had a long discussion.  We discussed both conservative and " surgical treatment.  He has expressed interest in pursuing an arthroplasty.  The risks, benefits, and alternatives were discussed.  He wants to pursue that.  We will look at getting that scheduled for him.  I do want to see him back for a preoperative visit.    Alex Ordaz MD    06/01/2018

## 2018-06-15 ENCOUNTER — OFFICE VISIT (OUTPATIENT)
Dept: ORTHOPEDIC SURGERY | Facility: CLINIC | Age: 73
End: 2018-06-15

## 2018-06-15 VITALS — BODY MASS INDEX: 37.56 KG/M2 | TEMPERATURE: 98.4 F | WEIGHT: 220 LBS | HEIGHT: 64 IN

## 2018-06-15 DIAGNOSIS — Z98.890 H/O SHOULDER SURGERY: Primary | ICD-10-CM

## 2018-06-15 PROCEDURE — 99212 OFFICE O/P EST SF 10 MIN: CPT | Performed by: ORTHOPAEDIC SURGERY

## 2018-06-15 PROCEDURE — 73030 X-RAY EXAM OF SHOULDER: CPT | Performed by: ORTHOPAEDIC SURGERY

## 2018-06-15 NOTE — PROGRESS NOTES
History & Physical         Patient: Jermaine Denis    YOB: 1945    Medical Record Number: [id number]    Chief Complaints:   Chief Complaint   Patient presents with   • Right Shoulder - Pre-op Exam   Follow-up status post right reverse total shoulder arthroplasty    History of Present Illness: 73 y.o. male who comes in today for 2 reasons.  The first is his right shoulder.  He is now roughly 5 months out from surgery.  He tells me the right shoulder is great.  He has no pain and excellent function.  He is very happy with his outcome.  He also comes in today for preop of his left shoulder.  The shoulder continues to cause him severe pain and limitations.  He has trouble performing any tasks at or above shoulder level.  He is scheduled for a shoulder replacement in 2 weeks.    Allergies: No Known Allergies    Medications:   Home Medications:    Current Outpatient Prescriptions:   •  amitriptyline (ELAVIL) 25 MG tablet, Take 25 mg by mouth Every Night., Disp: , Rfl:   •  aspirin 81 MG EC tablet, Take 81 mg by mouth Every Morning. Stopped 1/11/18, Disp: , Rfl:   •  calcium citrate-vitamin d (CALCITRATE) 315-250 MG-UNIT tablet tablet, Take 2 tablets by mouth 2 (Two) Times a Day., Disp: , Rfl:   •  cephalexin (KEFLEX) 500 MG capsule, Take 500 mg by mouth Every Morning., Disp: , Rfl:   •  cetirizine (zyrTEC) 10 MG tablet, Take 10 mg by mouth Daily As Needed., Disp: , Rfl:   •  cholecalciferol (VITAMIN D3) 400 units tablet, Take 400 Units by mouth 2 (Two) Times a Day. 2 tabs  Each dose, Disp: , Rfl:   •  docusate sodium 100 MG capsule, Take 100 mg by mouth 2 (Two) Times a Day As Needed for Constipation., Disp: , Rfl:   •  ferrous sulfate 325 (65 FE) MG tablet, Take 325 mg by mouth 2 (Two) Times a Day., Disp: , Rfl:   •  finasteride (PROSCAR) 5 MG tablet, Take 5 mg by mouth Every Morning., Disp: , Rfl:   •  furosemide (LASIX) 20 MG tablet, Take 20 mg by mouth Every Morning., Disp: , Rfl:   •  gabapentin  (NEURONTIN) 400 MG capsule, Take 800 mg by mouth 3 (Three) Times a Day., Disp: , Rfl:   •  lisinopril (PRINIVIL,ZESTRIL) 20 MG tablet, Take 10 mg by mouth Every Morning. 1/2 tab, Disp: , Rfl:   •  Omega-3 Fatty Acids (FISH OIL) 1000 MG capsule capsule, Take 1,000 mg by mouth Daily With Breakfast., Disp: , Rfl:   •  omeprazole (priLOSEC) 20 MG capsule, Take 20 mg by mouth Every Evening., Disp: , Rfl:   •  simvastatin (ZOCOR) 20 MG tablet, Take 10 mg by mouth Every Night., Disp: , Rfl:   •  vitamin B-12 (CYANOCOBALAMIN) 500 MCG tablet, Take 500 mcg by mouth See Admin Instructions. TWICE WEEKLY ON Monday & FRIDAY, Disp: , Rfl:     Past Medical History:   Diagnosis Date   • Arthritis    • BPH (benign prostatic hyperplasia)    • GERD (gastroesophageal reflux disease)    • High cholesterol    • History of hepatitis C     treated medically   • History of kidney stones    • Hypertension    • Sleep apnea           Past Surgical History:   Procedure Laterality Date   • ANKLE SURGERY Right     orif   • CARPAL TUNNEL RELEASE Left    • GASTRIC BYPASS      2008   • HAND SURGERY Right     thumb    • JOINT REPLACEMENT      lt knee   • TOTAL SHOULDER ARTHROPLASTY W/ DISTAL CLAVICLE EXCISION Right 1/18/2018    Procedure: Reverse Total Shoulder Arthroplasty;  Surgeon: Alex Ordaz MD;  Location: [facility];  Service:           Social History     Occupational History   • Not on file.     Social History Main Topics   • Smoking status: Former Smoker     Types: Cigarettes     Quit date: 1995   • Smokeless tobacco: Never Used   • Alcohol use No   • Drug use: No   • Sexual activity: Not on file      Social History     Social History Narrative   • No narrative on file          Family History   Problem Relation Age of Onset   • Malig Hyperthermia Neg Hx        Review of Systems:     Constitutional:  Denies fever, shaking or chills   Eyes:  Denies change in visual acuity   HEENT:  Denies nasal congestion or sore throat   Respiratory:   "Denies cough or shortness of breath   Cardiovascular:  Denies chest pain or edema   GI:  Denies abdominal pain, nausea, vomiting, bloody stools or diarrhea   Musculoskeletal:  Denies numbness tingling or loss of motor function except as outlined above in history of present illness.  Integument:  Denies rash, lesion or ulceration   Neurologic:  Denies headache or focal weakness, deficits      All other pertinent positives and negatives as noted above in HPI.    Physical Exam: 73 y.o. male     Vitals:    06/15/18 1159   Temp: 98.4 °F (36.9 °C)   TempSrc: Temporal Artery    Weight: 99.8 kg (220 lb)   Height: 162.6 cm (64\")     General:  Patient is awake and alert.  Appears in no acute distress or discomfort.    Psych:  Affect and demeanor are appropriate.    Eyes:  Conjunctiva and sclera appear grossly normal.  Eyes track well and EOM seem to be intact.    Ears:  No gross abnormalities.  Hearing adequate for the exam.    Cardiovascular:  Regular rate and rhythm.    Lungs:  Good chest expansion.  Breathing unlabored.    Lymph:  No palpable adenopathy about neck or axilla.    Neck:  Supple.  Normal ROM.  Negative Spurling's for shoulder or arm pain.    Left upper extremity:  Skin benign and intact without evidence for swelling, masses or atrophy.  No palpable masses.  No focal areas of exquisite tenderness.  Shoulder ROM limited and uncomfortable with crepitus.  No evident instability or apprehension.  Hornblowers negative.  ER lag sign positive.  Good strength in deltoid, wrist and hand.  Intact sensation in arm, hand.  Palpable radial pulse with brisk cap refill.    Right upper extremity:  incision is healed.  Good strength with elevation and abduction.  Excellent motion with the exception of internal rotation which is limited to roughly his back pocket.    Diagnostic Tests:  Lab Results   Component Value Date    GLUCOSE 128 (H) 01/10/2018    CALCIUM 8.9 01/10/2018     01/10/2018    K 4.5 01/10/2018    CO2 28.2 " 01/10/2018     01/10/2018    BUN 20 01/10/2018    CREATININE 1.06 01/10/2018    EGFRIFAFRI 83 01/10/2018    EGFRIFNONA 69 01/10/2018    BCR 18.9 01/10/2018    ANIONGAP 10.8 01/10/2018     Lab Results   Component Value Date    WBC 6.24 01/10/2018    HGB 10.2 (L) 01/19/2018    HCT 31.6 (L) 01/19/2018    MCV 97.4 (H) 01/10/2018     01/10/2018     No results found for: INR, PROTIME     X-rays: Previous x-rays of the left shoulder are reviewed and show advanced rotator cuff tear arthropathy.  AP and scapular Y views the right shoulder are ordered and reviewed today for comparison purposes.  The implants appear well fixed and well positioned.  No complicating process noted.    Assessment:  1.  Left shoulder rotator cuff tear arthropathy  2.  5 months status post right reverse total shoulder plastic, doing well    Plan: We had a thorough discussion regarding the risks, benefits and alternatives to an arthroplasty and non-surgical management versus surgery.  I explained that surgical risks include infection, hematoma, hardware related complications including failure of fixation, loosening, fracture, persistent pain and/or loss of motion, iatrogenic nerve and/or blood vessel injury resulting in permanent weakness, numbness or dysfunction, DVT, PE, positioning related neuropraxia, and anesthesia related complications resulting in death.  We discussed the indication for a reverse as opposed to a standard total shoulder and the risks inherent to the reverse including notching, glenoid loosening, instability, and traction related neuropraxia, any of which could result in persistent pain or problems requiring further surgery.  Lastly, we discussed the rehab and all that will be expected of the patient post operatively to ensure an optimal outcome.  The patient voiced understanding of the risks, benefits, and alternative forms of treatment that were discussed and the patient consents to proceed with the reverse  arthroplasty.      The right shoulder is doing well.  We discussed antibiotic prophylaxis for dental procedures.  He will follow-up annually for the right shoulder.      Date: 6/15/2018    Alex Ordaz MD

## 2018-06-21 ENCOUNTER — APPOINTMENT (OUTPATIENT)
Dept: PREADMISSION TESTING | Facility: HOSPITAL | Age: 73
End: 2018-06-21

## 2018-06-21 VITALS
TEMPERATURE: 97.9 F | BODY MASS INDEX: 38.98 KG/M2 | OXYGEN SATURATION: 98 % | RESPIRATION RATE: 20 BRPM | HEIGHT: 63 IN | SYSTOLIC BLOOD PRESSURE: 119 MMHG | WEIGHT: 220 LBS | DIASTOLIC BLOOD PRESSURE: 66 MMHG | HEART RATE: 65 BPM

## 2018-06-21 DIAGNOSIS — M75.102 LEFT ROTATOR CUFF TEAR ARTHROPATHY: ICD-10-CM

## 2018-06-21 DIAGNOSIS — M12.812 LEFT ROTATOR CUFF TEAR ARTHROPATHY: ICD-10-CM

## 2018-06-21 LAB
ANION GAP SERPL CALCULATED.3IONS-SCNC: 10.6 MMOL/L
BACTERIA UR QL AUTO: ABNORMAL /HPF
BILIRUB UR QL STRIP: NEGATIVE
BUN BLD-MCNC: 21 MG/DL (ref 8–23)
BUN/CREAT SERPL: 21.2 (ref 7–25)
CALCIUM SPEC-SCNC: 9.2 MG/DL (ref 8.6–10.5)
CHLORIDE SERPL-SCNC: 105 MMOL/L (ref 98–107)
CLARITY UR: CLEAR
CO2 SERPL-SCNC: 24.4 MMOL/L (ref 22–29)
COLOR UR: YELLOW
CREAT BLD-MCNC: 0.99 MG/DL (ref 0.76–1.27)
DEPRECATED RDW RBC AUTO: 44.6 FL (ref 37–54)
ERYTHROCYTE [DISTWIDTH] IN BLOOD BY AUTOMATED COUNT: 12.7 % (ref 11.5–14.5)
GFR SERPL CREATININE-BSD FRML MDRD: 74 ML/MIN/1.73
GFR SERPL CREATININE-BSD FRML MDRD: 90 ML/MIN/1.73
GLUCOSE BLD-MCNC: 94 MG/DL (ref 65–99)
GLUCOSE UR STRIP-MCNC: NEGATIVE MG/DL
HCT VFR BLD AUTO: 38.5 % (ref 40.4–52.2)
HGB BLD-MCNC: 12.6 G/DL (ref 13.7–17.6)
HGB UR QL STRIP.AUTO: ABNORMAL
HYALINE CASTS UR QL AUTO: ABNORMAL /LPF
KETONES UR QL STRIP: NEGATIVE
LEUKOCYTE ESTERASE UR QL STRIP.AUTO: ABNORMAL
MCH RBC QN AUTO: 31.6 PG (ref 27–32.7)
MCHC RBC AUTO-ENTMCNC: 32.7 G/DL (ref 32.6–36.4)
MCV RBC AUTO: 96.5 FL (ref 79.8–96.2)
NITRITE UR QL STRIP: NEGATIVE
PH UR STRIP.AUTO: <=5 [PH] (ref 5–8)
PLATELET # BLD AUTO: 244 10*3/MM3 (ref 140–500)
PMV BLD AUTO: 10.5 FL (ref 6–12)
POTASSIUM BLD-SCNC: 4.6 MMOL/L (ref 3.5–5.2)
PROT UR QL STRIP: NEGATIVE
RBC # BLD AUTO: 3.99 10*6/MM3 (ref 4.6–6)
RBC # UR: ABNORMAL /HPF
REF LAB TEST METHOD: ABNORMAL
SODIUM BLD-SCNC: 140 MMOL/L (ref 136–145)
SP GR UR STRIP: 1.02 (ref 1–1.03)
SQUAMOUS #/AREA URNS HPF: ABNORMAL /HPF
UROBILINOGEN UR QL STRIP: ABNORMAL
WBC NRBC COR # BLD: 6.42 10*3/MM3 (ref 4.5–10.7)
WBC UR QL AUTO: ABNORMAL /HPF

## 2018-06-21 PROCEDURE — 81001 URINALYSIS AUTO W/SCOPE: CPT | Performed by: ORTHOPAEDIC SURGERY

## 2018-06-21 PROCEDURE — 85027 COMPLETE CBC AUTOMATED: CPT | Performed by: ORTHOPAEDIC SURGERY

## 2018-06-21 PROCEDURE — 87086 URINE CULTURE/COLONY COUNT: CPT | Performed by: ORTHOPAEDIC SURGERY

## 2018-06-21 PROCEDURE — 80048 BASIC METABOLIC PNL TOTAL CA: CPT | Performed by: ORTHOPAEDIC SURGERY

## 2018-06-21 PROCEDURE — 36415 COLL VENOUS BLD VENIPUNCTURE: CPT

## 2018-06-21 RX ORDER — AMITRIPTYLINE HYDROCHLORIDE 50 MG/1
50 TABLET, FILM COATED ORAL NIGHTLY
Status: ON HOLD | COMMUNITY
End: 2023-02-27

## 2018-06-21 RX ORDER — CHLORHEXIDINE GLUCONATE 500 MG/1
1 CLOTH TOPICAL
COMMUNITY
End: 2018-06-29 | Stop reason: HOSPADM

## 2018-06-22 LAB — BACTERIA SPEC AEROBE CULT: NORMAL

## 2018-06-28 ENCOUNTER — ANESTHESIA (OUTPATIENT)
Dept: PERIOP | Facility: HOSPITAL | Age: 73
End: 2018-06-28

## 2018-06-28 ENCOUNTER — APPOINTMENT (OUTPATIENT)
Dept: GENERAL RADIOLOGY | Facility: HOSPITAL | Age: 73
End: 2018-06-28

## 2018-06-28 ENCOUNTER — HOSPITAL ENCOUNTER (INPATIENT)
Facility: HOSPITAL | Age: 73
LOS: 1 days | Discharge: HOME | End: 2018-06-29
Attending: ORTHOPAEDIC SURGERY | Admitting: ORTHOPAEDIC SURGERY

## 2018-06-28 ENCOUNTER — ANESTHESIA EVENT (OUTPATIENT)
Dept: PERIOP | Facility: HOSPITAL | Age: 73
End: 2018-06-28

## 2018-06-28 DIAGNOSIS — M75.102 LEFT ROTATOR CUFF TEAR ARTHROPATHY: ICD-10-CM

## 2018-06-28 DIAGNOSIS — M12.812 LEFT ROTATOR CUFF TEAR ARTHROPATHY: ICD-10-CM

## 2018-06-28 PROBLEM — M19.012 OSTEOARTHRITIS OF LEFT SHOULDER: Status: ACTIVE | Noted: 2018-06-28

## 2018-06-28 LAB
GLUCOSE BLDC GLUCOMTR-MCNC: 129 MG/DL (ref 70–130)
GLUCOSE BLDC GLUCOMTR-MCNC: 303 MG/DL (ref 70–130)

## 2018-06-28 PROCEDURE — 25010000003 CEFAZOLIN IN DEXTROSE 2-4 GM/100ML-% SOLUTION: Performed by: ORTHOPAEDIC SURGERY

## 2018-06-28 PROCEDURE — 25010000002 SUCCINYLCHOLINE PER 20 MG: Performed by: NURSE ANESTHETIST, CERTIFIED REGISTERED

## 2018-06-28 PROCEDURE — 25010000002 KETOROLAC TROMETHAMINE PER 15 MG: Performed by: NURSE ANESTHETIST, CERTIFIED REGISTERED

## 2018-06-28 PROCEDURE — 25010000002 MIDAZOLAM PER 1 MG: Performed by: ANESTHESIOLOGY

## 2018-06-28 PROCEDURE — 0RRK00Z REPLACEMENT OF LEFT SHOULDER JOINT WITH REVERSE BALL AND SOCKET SYNTHETIC SUBSTITUTE, OPEN APPROACH: ICD-10-PCS | Performed by: ORTHOPAEDIC SURGERY

## 2018-06-28 PROCEDURE — 82962 GLUCOSE BLOOD TEST: CPT

## 2018-06-28 PROCEDURE — 23472 RECONSTRUCT SHOULDER JOINT: CPT | Performed by: ORTHOPAEDIC SURGERY

## 2018-06-28 PROCEDURE — 25010000002 PROPOFOL 10 MG/ML EMULSION: Performed by: NURSE ANESTHETIST, CERTIFIED REGISTERED

## 2018-06-28 PROCEDURE — 25010000002 VANCOMYCIN 10 G RECONSTITUTED SOLUTION: Performed by: ORTHOPAEDIC SURGERY

## 2018-06-28 PROCEDURE — C1776 JOINT DEVICE (IMPLANTABLE): HCPCS | Performed by: ORTHOPAEDIC SURGERY

## 2018-06-28 PROCEDURE — 25010000002 DEXAMETHASONE PER 1 MG: Performed by: NURSE ANESTHETIST, CERTIFIED REGISTERED

## 2018-06-28 PROCEDURE — 73020 X-RAY EXAM OF SHOULDER: CPT

## 2018-06-28 PROCEDURE — 25010000002 FENTANYL CITRATE (PF) 100 MCG/2ML SOLUTION: Performed by: ANESTHESIOLOGY

## 2018-06-28 PROCEDURE — 25010000002 ONDANSETRON PER 1 MG: Performed by: NURSE ANESTHETIST, CERTIFIED REGISTERED

## 2018-06-28 PROCEDURE — 25010000002 FENTANYL CITRATE (PF) 100 MCG/2ML SOLUTION: Performed by: NURSE ANESTHETIST, CERTIFIED REGISTERED

## 2018-06-28 DEVICE — IMPLANTABLE DEVICE: Type: IMPLANTABLE DEVICE | Site: SHOULDER | Status: FUNCTIONAL

## 2018-06-28 DEVICE — IMPLANTABLE DEVICE
Type: IMPLANTABLE DEVICE | Site: SHOULDER | Status: FUNCTIONAL
Brand: COMPREHENSIVE SHOULDER SYSTEM

## 2018-06-28 DEVICE — IMPLANTABLE DEVICE
Type: IMPLANTABLE DEVICE | Site: SHOULDER | Status: FUNCTIONAL
Brand: COMPREHENSIVE® REVERSE SHOULDER

## 2018-06-28 DEVICE — IMPLANTABLE DEVICE
Type: IMPLANTABLE DEVICE | Site: SHOULDER | Status: FUNCTIONAL
Brand: COMPREHENSIVE REVERSE SHOULDER

## 2018-06-28 RX ORDER — LABETALOL HYDROCHLORIDE 5 MG/ML
5 INJECTION, SOLUTION INTRAVENOUS
Status: DISCONTINUED | OUTPATIENT
Start: 2018-06-28 | End: 2018-06-28 | Stop reason: HOSPADM

## 2018-06-28 RX ORDER — GLYCOPYRROLATE 0.2 MG/ML
INJECTION INTRAMUSCULAR; INTRAVENOUS AS NEEDED
Status: DISCONTINUED | OUTPATIENT
Start: 2018-06-28 | End: 2018-06-28 | Stop reason: SURG

## 2018-06-28 RX ORDER — OXYCODONE AND ACETAMINOPHEN 7.5; 325 MG/1; MG/1
1 TABLET ORAL EVERY 4 HOURS PRN
Status: DISCONTINUED | OUTPATIENT
Start: 2018-06-28 | End: 2018-06-29 | Stop reason: HOSPADM

## 2018-06-28 RX ORDER — MIDAZOLAM HYDROCHLORIDE 1 MG/ML
1 INJECTION INTRAMUSCULAR; INTRAVENOUS
Status: DISCONTINUED | OUTPATIENT
Start: 2018-06-28 | End: 2018-06-28 | Stop reason: HOSPADM

## 2018-06-28 RX ORDER — TRANEXAMIC ACID 100 MG/ML
INJECTION, SOLUTION INTRAVENOUS AS NEEDED
Status: DISCONTINUED | OUTPATIENT
Start: 2018-06-28 | End: 2018-06-28 | Stop reason: SURG

## 2018-06-28 RX ORDER — MAGNESIUM HYDROXIDE 1200 MG/15ML
LIQUID ORAL AS NEEDED
Status: DISCONTINUED | OUTPATIENT
Start: 2018-06-28 | End: 2018-06-28 | Stop reason: HOSPADM

## 2018-06-28 RX ORDER — GABAPENTIN 400 MG/1
800 CAPSULE ORAL 3 TIMES DAILY
Status: DISCONTINUED | OUTPATIENT
Start: 2018-06-28 | End: 2018-06-29 | Stop reason: HOSPADM

## 2018-06-28 RX ORDER — FENTANYL CITRATE 50 UG/ML
50 INJECTION, SOLUTION INTRAMUSCULAR; INTRAVENOUS
Status: DISCONTINUED | OUTPATIENT
Start: 2018-06-28 | End: 2018-06-28 | Stop reason: HOSPADM

## 2018-06-28 RX ORDER — FAMOTIDINE 10 MG/ML
20 INJECTION, SOLUTION INTRAVENOUS ONCE
Status: COMPLETED | OUTPATIENT
Start: 2018-06-28 | End: 2018-06-28

## 2018-06-28 RX ORDER — CEFAZOLIN SODIUM 2 G/100ML
2 INJECTION, SOLUTION INTRAVENOUS EVERY 8 HOURS
Status: COMPLETED | OUTPATIENT
Start: 2018-06-28 | End: 2018-06-29

## 2018-06-28 RX ORDER — PREGABALIN 75 MG/1
150 CAPSULE ORAL ONCE
Status: COMPLETED | OUTPATIENT
Start: 2018-06-28 | End: 2018-06-28

## 2018-06-28 RX ORDER — AMITRIPTYLINE HYDROCHLORIDE 50 MG/1
50 TABLET, FILM COATED ORAL NIGHTLY
Status: DISCONTINUED | OUTPATIENT
Start: 2018-06-28 | End: 2018-06-29 | Stop reason: HOSPADM

## 2018-06-28 RX ORDER — MIDAZOLAM HYDROCHLORIDE 1 MG/ML
2 INJECTION INTRAMUSCULAR; INTRAVENOUS
Status: DISCONTINUED | OUTPATIENT
Start: 2018-06-28 | End: 2018-06-28 | Stop reason: HOSPADM

## 2018-06-28 RX ORDER — PROMETHAZINE HYDROCHLORIDE 25 MG/ML
12.5 INJECTION, SOLUTION INTRAMUSCULAR; INTRAVENOUS ONCE AS NEEDED
Status: DISCONTINUED | OUTPATIENT
Start: 2018-06-28 | End: 2018-06-28 | Stop reason: HOSPADM

## 2018-06-28 RX ORDER — ONDANSETRON 4 MG/1
4 TABLET, FILM COATED ORAL EVERY 6 HOURS PRN
Status: DISCONTINUED | OUTPATIENT
Start: 2018-06-28 | End: 2018-06-29 | Stop reason: HOSPADM

## 2018-06-28 RX ORDER — FUROSEMIDE 20 MG/1
20 TABLET ORAL EVERY MORNING
Status: DISCONTINUED | OUTPATIENT
Start: 2018-06-29 | End: 2018-06-29 | Stop reason: HOSPADM

## 2018-06-28 RX ORDER — PROMETHAZINE HYDROCHLORIDE 25 MG/1
12.5 TABLET ORAL ONCE AS NEEDED
Status: DISCONTINUED | OUTPATIENT
Start: 2018-06-28 | End: 2018-06-28 | Stop reason: HOSPADM

## 2018-06-28 RX ORDER — NALOXONE HCL 0.4 MG/ML
0.1 VIAL (ML) INJECTION
Status: DISCONTINUED | OUTPATIENT
Start: 2018-06-28 | End: 2018-06-29 | Stop reason: HOSPADM

## 2018-06-28 RX ORDER — SODIUM CHLORIDE 0.9 % (FLUSH) 0.9 %
1-10 SYRINGE (ML) INJECTION AS NEEDED
Status: DISCONTINUED | OUTPATIENT
Start: 2018-06-28 | End: 2018-06-28 | Stop reason: HOSPADM

## 2018-06-28 RX ORDER — SUCCINYLCHOLINE CHLORIDE 20 MG/ML
INJECTION INTRAMUSCULAR; INTRAVENOUS AS NEEDED
Status: DISCONTINUED | OUTPATIENT
Start: 2018-06-28 | End: 2018-06-28 | Stop reason: SURG

## 2018-06-28 RX ORDER — FENTANYL CITRATE 50 UG/ML
25 INJECTION, SOLUTION INTRAMUSCULAR; INTRAVENOUS
Status: DISCONTINUED | OUTPATIENT
Start: 2018-06-28 | End: 2018-06-28 | Stop reason: HOSPADM

## 2018-06-28 RX ORDER — CEFAZOLIN SODIUM 2 G/100ML
2 INJECTION, SOLUTION INTRAVENOUS ONCE
Status: DISCONTINUED | OUTPATIENT
Start: 2018-06-28 | End: 2018-06-28 | Stop reason: HOSPADM

## 2018-06-28 RX ORDER — OXYCODONE AND ACETAMINOPHEN 7.5; 325 MG/1; MG/1
2 TABLET ORAL EVERY 4 HOURS PRN
Status: DISCONTINUED | OUTPATIENT
Start: 2018-06-28 | End: 2018-06-29 | Stop reason: HOSPADM

## 2018-06-28 RX ORDER — BISACODYL 10 MG
10 SUPPOSITORY, RECTAL RECTAL DAILY PRN
Status: DISCONTINUED | OUTPATIENT
Start: 2018-06-28 | End: 2018-06-29 | Stop reason: HOSPADM

## 2018-06-28 RX ORDER — DOCUSATE SODIUM 100 MG/1
100 CAPSULE, LIQUID FILLED ORAL 2 TIMES DAILY
Status: DISCONTINUED | OUTPATIENT
Start: 2018-06-28 | End: 2018-06-29 | Stop reason: HOSPADM

## 2018-06-28 RX ORDER — ATORVASTATIN CALCIUM 10 MG/1
10 TABLET, FILM COATED ORAL DAILY
Status: DISCONTINUED | OUTPATIENT
Start: 2018-06-28 | End: 2018-06-29 | Stop reason: HOSPADM

## 2018-06-28 RX ORDER — PANTOPRAZOLE SODIUM 40 MG/1
40 TABLET, DELAYED RELEASE ORAL EVERY MORNING
Status: DISCONTINUED | OUTPATIENT
Start: 2018-06-29 | End: 2018-06-29 | Stop reason: HOSPADM

## 2018-06-28 RX ORDER — SODIUM CHLORIDE, SODIUM LACTATE, POTASSIUM CHLORIDE, CALCIUM CHLORIDE 600; 310; 30; 20 MG/100ML; MG/100ML; MG/100ML; MG/100ML
100 INJECTION, SOLUTION INTRAVENOUS CONTINUOUS
Status: DISCONTINUED | OUTPATIENT
Start: 2018-06-28 | End: 2018-06-29 | Stop reason: HOSPADM

## 2018-06-28 RX ORDER — NALOXONE HCL 0.4 MG/ML
0.2 VIAL (ML) INJECTION AS NEEDED
Status: DISCONTINUED | OUTPATIENT
Start: 2018-06-28 | End: 2018-06-28 | Stop reason: HOSPADM

## 2018-06-28 RX ORDER — FERROUS SULFATE 325(65) MG
325 TABLET ORAL 2 TIMES DAILY
Status: DISCONTINUED | OUTPATIENT
Start: 2018-06-28 | End: 2018-06-29 | Stop reason: HOSPADM

## 2018-06-28 RX ORDER — ONDANSETRON 2 MG/ML
4 INJECTION INTRAMUSCULAR; INTRAVENOUS EVERY 6 HOURS PRN
Status: DISCONTINUED | OUTPATIENT
Start: 2018-06-28 | End: 2018-06-29 | Stop reason: HOSPADM

## 2018-06-28 RX ORDER — ALBUTEROL SULFATE 2.5 MG/3ML
2.5 SOLUTION RESPIRATORY (INHALATION) ONCE AS NEEDED
Status: DISCONTINUED | OUTPATIENT
Start: 2018-06-28 | End: 2018-06-28 | Stop reason: HOSPADM

## 2018-06-28 RX ORDER — ONDANSETRON 2 MG/ML
4 INJECTION INTRAMUSCULAR; INTRAVENOUS ONCE AS NEEDED
Status: DISCONTINUED | OUTPATIENT
Start: 2018-06-28 | End: 2018-06-28 | Stop reason: HOSPADM

## 2018-06-28 RX ORDER — HYDROCODONE BITARTRATE AND ACETAMINOPHEN 7.5; 325 MG/1; MG/1
1 TABLET ORAL ONCE AS NEEDED
Status: DISCONTINUED | OUTPATIENT
Start: 2018-06-28 | End: 2018-06-28 | Stop reason: HOSPADM

## 2018-06-28 RX ORDER — ONDANSETRON 4 MG/1
4 TABLET, ORALLY DISINTEGRATING ORAL EVERY 6 HOURS PRN
Status: DISCONTINUED | OUTPATIENT
Start: 2018-06-28 | End: 2018-06-29 | Stop reason: HOSPADM

## 2018-06-28 RX ORDER — FLUMAZENIL 0.1 MG/ML
0.2 INJECTION INTRAVENOUS AS NEEDED
Status: DISCONTINUED | OUTPATIENT
Start: 2018-06-28 | End: 2018-06-28 | Stop reason: HOSPADM

## 2018-06-28 RX ORDER — MELOXICAM 15 MG/1
15 TABLET ORAL ONCE
Status: COMPLETED | OUTPATIENT
Start: 2018-06-28 | End: 2018-06-28

## 2018-06-28 RX ORDER — OXYCODONE AND ACETAMINOPHEN 7.5; 325 MG/1; MG/1
1 TABLET ORAL ONCE AS NEEDED
Status: DISCONTINUED | OUTPATIENT
Start: 2018-06-28 | End: 2018-06-28 | Stop reason: HOSPADM

## 2018-06-28 RX ORDER — LIDOCAINE HYDROCHLORIDE 20 MG/ML
INJECTION, SOLUTION INFILTRATION; PERINEURAL AS NEEDED
Status: DISCONTINUED | OUTPATIENT
Start: 2018-06-28 | End: 2018-06-28 | Stop reason: SURG

## 2018-06-28 RX ORDER — DEXAMETHASONE SODIUM PHOSPHATE 10 MG/ML
INJECTION INTRAMUSCULAR; INTRAVENOUS AS NEEDED
Status: DISCONTINUED | OUTPATIENT
Start: 2018-06-28 | End: 2018-06-28 | Stop reason: SURG

## 2018-06-28 RX ORDER — ONDANSETRON 2 MG/ML
INJECTION INTRAMUSCULAR; INTRAVENOUS AS NEEDED
Status: DISCONTINUED | OUTPATIENT
Start: 2018-06-28 | End: 2018-06-28 | Stop reason: SURG

## 2018-06-28 RX ORDER — EPHEDRINE SULFATE 50 MG/ML
INJECTION, SOLUTION INTRAVENOUS AS NEEDED
Status: DISCONTINUED | OUTPATIENT
Start: 2018-06-28 | End: 2018-06-28 | Stop reason: SURG

## 2018-06-28 RX ORDER — PROPOFOL 10 MG/ML
VIAL (ML) INTRAVENOUS AS NEEDED
Status: DISCONTINUED | OUTPATIENT
Start: 2018-06-28 | End: 2018-06-28 | Stop reason: SURG

## 2018-06-28 RX ORDER — DIPHENHYDRAMINE HYDROCHLORIDE 50 MG/ML
12.5 INJECTION INTRAMUSCULAR; INTRAVENOUS
Status: DISCONTINUED | OUTPATIENT
Start: 2018-06-28 | End: 2018-06-28 | Stop reason: HOSPADM

## 2018-06-28 RX ORDER — EPHEDRINE SULFATE 50 MG/ML
5 INJECTION, SOLUTION INTRAVENOUS ONCE AS NEEDED
Status: DISCONTINUED | OUTPATIENT
Start: 2018-06-28 | End: 2018-06-28 | Stop reason: HOSPADM

## 2018-06-28 RX ORDER — SODIUM CHLORIDE, SODIUM LACTATE, POTASSIUM CHLORIDE, CALCIUM CHLORIDE 600; 310; 30; 20 MG/100ML; MG/100ML; MG/100ML; MG/100ML
9 INJECTION, SOLUTION INTRAVENOUS CONTINUOUS
Status: DISCONTINUED | OUTPATIENT
Start: 2018-06-28 | End: 2018-06-28 | Stop reason: HOSPADM

## 2018-06-28 RX ORDER — ACETAMINOPHEN 325 MG/1
650 TABLET ORAL EVERY 4 HOURS PRN
Status: DISCONTINUED | OUTPATIENT
Start: 2018-06-28 | End: 2018-06-29 | Stop reason: HOSPADM

## 2018-06-28 RX ORDER — FENTANYL CITRATE 50 UG/ML
INJECTION, SOLUTION INTRAMUSCULAR; INTRAVENOUS AS NEEDED
Status: DISCONTINUED | OUTPATIENT
Start: 2018-06-28 | End: 2018-06-28 | Stop reason: SURG

## 2018-06-28 RX ORDER — CETIRIZINE HYDROCHLORIDE 10 MG/1
10 TABLET ORAL DAILY PRN
Status: DISCONTINUED | OUTPATIENT
Start: 2018-06-28 | End: 2018-06-29 | Stop reason: HOSPADM

## 2018-06-28 RX ORDER — LISINOPRIL 10 MG/1
10 TABLET ORAL EVERY MORNING
Status: DISCONTINUED | OUTPATIENT
Start: 2018-06-29 | End: 2018-06-29 | Stop reason: HOSPADM

## 2018-06-28 RX ORDER — ACETAMINOPHEN 325 MG/1
1000 TABLET ORAL ONCE
Status: COMPLETED | OUTPATIENT
Start: 2018-06-28 | End: 2018-06-28

## 2018-06-28 RX ORDER — KETOROLAC TROMETHAMINE 30 MG/ML
15 INJECTION, SOLUTION INTRAMUSCULAR; INTRAVENOUS EVERY 6 HOURS PRN
Status: DISCONTINUED | OUTPATIENT
Start: 2018-06-28 | End: 2018-06-29 | Stop reason: HOSPADM

## 2018-06-28 RX ORDER — KETOROLAC TROMETHAMINE 30 MG/ML
INJECTION, SOLUTION INTRAMUSCULAR; INTRAVENOUS AS NEEDED
Status: DISCONTINUED | OUTPATIENT
Start: 2018-06-28 | End: 2018-06-28 | Stop reason: SURG

## 2018-06-28 RX ORDER — PROMETHAZINE HYDROCHLORIDE 25 MG/1
25 TABLET ORAL ONCE AS NEEDED
Status: DISCONTINUED | OUTPATIENT
Start: 2018-06-28 | End: 2018-06-28 | Stop reason: HOSPADM

## 2018-06-28 RX ORDER — PROMETHAZINE HYDROCHLORIDE 25 MG/1
25 SUPPOSITORY RECTAL ONCE AS NEEDED
Status: DISCONTINUED | OUTPATIENT
Start: 2018-06-28 | End: 2018-06-28 | Stop reason: HOSPADM

## 2018-06-28 RX ORDER — FINASTERIDE 5 MG/1
5 TABLET, FILM COATED ORAL EVERY MORNING
Status: DISCONTINUED | OUTPATIENT
Start: 2018-06-29 | End: 2018-06-29 | Stop reason: HOSPADM

## 2018-06-28 RX ORDER — ROCURONIUM BROMIDE 10 MG/ML
INJECTION, SOLUTION INTRAVENOUS AS NEEDED
Status: DISCONTINUED | OUTPATIENT
Start: 2018-06-28 | End: 2018-06-28 | Stop reason: SURG

## 2018-06-28 RX ADMIN — MUPIROCIN: 20 OINTMENT TOPICAL at 22:33

## 2018-06-28 RX ADMIN — FENTANYL CITRATE 50 MCG: 50 INJECTION, SOLUTION INTRAMUSCULAR; INTRAVENOUS at 10:32

## 2018-06-28 RX ADMIN — ACETAMINOPHEN 975 MG: 325 TABLET ORAL at 08:17

## 2018-06-28 RX ADMIN — CEFAZOLIN SODIUM 2 G: 2 INJECTION, SOLUTION INTRAVENOUS at 18:26

## 2018-06-28 RX ADMIN — GLYCOPYRROLATE 0.2 MG: 0.2 INJECTION INTRAMUSCULAR; INTRAVENOUS at 11:31

## 2018-06-28 RX ADMIN — AMITRIPTYLINE HYDROCHLORIDE 50 MG: 50 TABLET, FILM COATED ORAL at 22:34

## 2018-06-28 RX ADMIN — GABAPENTIN 800 MG: 400 CAPSULE ORAL at 16:32

## 2018-06-28 RX ADMIN — MELOXICAM 15 MG: 15 TABLET ORAL at 08:17

## 2018-06-28 RX ADMIN — SODIUM CHLORIDE, POTASSIUM CHLORIDE, SODIUM LACTATE AND CALCIUM CHLORIDE 9 ML/HR: 600; 310; 30; 20 INJECTION, SOLUTION INTRAVENOUS at 09:01

## 2018-06-28 RX ADMIN — ROCURONIUM BROMIDE 5 MG: 10 INJECTION INTRAVENOUS at 11:33

## 2018-06-28 RX ADMIN — PROPOFOL 180 MG: 10 INJECTION, EMULSION INTRAVENOUS at 11:33

## 2018-06-28 RX ADMIN — EPHEDRINE SULFATE 10 MG: 50 INJECTION INTRAMUSCULAR; INTRAVENOUS; SUBCUTANEOUS at 11:45

## 2018-06-28 RX ADMIN — FENTANYL CITRATE 50 MCG: 50 INJECTION INTRAMUSCULAR; INTRAVENOUS at 12:19

## 2018-06-28 RX ADMIN — TRANEXAMIC ACID 1000 MG: 100 INJECTION, SOLUTION INTRAVENOUS at 11:40

## 2018-06-28 RX ADMIN — FENTANYL CITRATE 50 MCG: 50 INJECTION INTRAMUSCULAR; INTRAVENOUS at 11:31

## 2018-06-28 RX ADMIN — MIDAZOLAM 1 MG: 1 INJECTION INTRAMUSCULAR; INTRAVENOUS at 09:02

## 2018-06-28 RX ADMIN — KETOROLAC TROMETHAMINE 30 MG: 30 INJECTION, SOLUTION INTRAMUSCULAR; INTRAVENOUS at 12:50

## 2018-06-28 RX ADMIN — LIDOCAINE HYDROCHLORIDE 50 MG: 20 INJECTION, SOLUTION INFILTRATION; PERINEURAL at 11:33

## 2018-06-28 RX ADMIN — GABAPENTIN 800 MG: 400 CAPSULE ORAL at 22:34

## 2018-06-28 RX ADMIN — ATORVASTATIN CALCIUM 10 MG: 10 TABLET, FILM COATED ORAL at 22:34

## 2018-06-28 RX ADMIN — FAMOTIDINE 20 MG: 10 INJECTION, SOLUTION INTRAVENOUS at 09:02

## 2018-06-28 RX ADMIN — SUGAMMADEX 100 MG: 100 INJECTION, SOLUTION INTRAVENOUS at 12:59

## 2018-06-28 RX ADMIN — DEXAMETHASONE SODIUM PHOSPHATE 6 MG: 10 INJECTION INTRAMUSCULAR; INTRAVENOUS at 12:13

## 2018-06-28 RX ADMIN — SODIUM CHLORIDE, POTASSIUM CHLORIDE, SODIUM LACTATE AND CALCIUM CHLORIDE 100 ML/HR: 600; 310; 30; 20 INJECTION, SOLUTION INTRAVENOUS at 17:16

## 2018-06-28 RX ADMIN — VANCOMYCIN HYDROCHLORIDE 1500 MG: 10 INJECTION, POWDER, LYOPHILIZED, FOR SOLUTION INTRAVENOUS at 22:33

## 2018-06-28 RX ADMIN — ONDANSETRON 4 MG: 2 INJECTION INTRAMUSCULAR; INTRAVENOUS at 12:58

## 2018-06-28 RX ADMIN — MIDAZOLAM 2 MG: 1 INJECTION INTRAMUSCULAR; INTRAVENOUS at 10:32

## 2018-06-28 RX ADMIN — DOCUSATE SODIUM 100 MG: 100 CAPSULE, LIQUID FILLED ORAL at 22:34

## 2018-06-28 RX ADMIN — SUCCINYLCHOLINE CHLORIDE 100 MG: 20 INJECTION, SOLUTION INTRAMUSCULAR; INTRAVENOUS; PARENTERAL at 11:33

## 2018-06-28 RX ADMIN — VANCOMYCIN HYDROCHLORIDE 1500 MG: 10 INJECTION, POWDER, LYOPHILIZED, FOR SOLUTION INTRAVENOUS at 10:24

## 2018-06-28 RX ADMIN — TRANEXAMIC ACID 1000 MG: 100 INJECTION, SOLUTION INTRAVENOUS at 12:50

## 2018-06-28 RX ADMIN — FERROUS SULFATE TAB 325 MG (65 MG ELEMENTAL FE) 325 MG: 325 (65 FE) TAB at 22:34

## 2018-06-28 RX ADMIN — PREGABALIN 150 MG: 75 CAPSULE ORAL at 08:17

## 2018-06-28 RX ADMIN — OXYCODONE HYDROCHLORIDE AND ACETAMINOPHEN 2 TABLET: 7.5; 325 TABLET ORAL at 22:34

## 2018-06-28 RX ADMIN — ROCURONIUM BROMIDE 20 MG: 10 INJECTION INTRAVENOUS at 11:45

## 2018-06-28 NOTE — ANESTHESIA PROCEDURE NOTES
Peripheral Block    Patient location during procedure: holding area  Start time: 6/28/2018 10:32 AM  Stop time: 6/28/2018 10:42 AM  Reason for block: at surgeon's request and post-op pain management  Performed by  Anesthesiologist: GALDINO TORRES  Preanesthetic Checklist  Completed: patient identified, site marked, surgical consent, pre-op evaluation, timeout performed, IV checked, risks and benefits discussed and monitors and equipment checked  Prep:  Pt Position: supine  Sterile barriers:cap, gloves and sterile barriers  Prep: ChloraPrep  Patient monitoring: blood pressure monitoring, continuous pulse oximetry and EKG  Procedure  Sedation:yes  Performed under: PNB  Guidance:ultrasound guided and nerve stimulator  Images:still images obtained    Laterality:left  Block Type:interscalene    Needle Type:echogenic  Needle Gauge:20 G    Medications  Depomedrol:40 mg  Local Injected:ropivacaine 0.5% Local Amount Injected:30mL  Post Assessment  Patient Tolerance:comfortable throughout block  Complications:no

## 2018-06-28 NOTE — ANESTHESIA PROCEDURE NOTES
Airway  Urgency: elective    Airway not difficult    General Information and Staff    Patient location during procedure: OR  Anesthesiologist: ANNA PENA  CRNA: KAZ WYMAN    Indications and Patient Condition  Indications for airway management: airway protection    Preoxygenated: yes  MILS maintained throughout  Mask difficulty assessment: 1 - vent by mask    Final Airway Details  Final airway type: endotracheal airway      Successful airway: ETT  Cuffed: yes   Successful intubation technique: direct laryngoscopy  Endotracheal tube insertion site: oral  Blade: Walter  Blade size: #3  ETT size: 7.0 mm  Cormack-Lehane Classification: grade I - full view of glottis  Placement verified by: chest auscultation and capnometry   Inital cuff pressure (cm H2O): 22  Cuff volume (mL): 8  Measured from: lips  ETT to lips (cm): 23  Number of attempts at approach: 1

## 2018-06-28 NOTE — ANESTHESIA PREPROCEDURE EVALUATION
Anesthesia Evaluation                  Airway   Mallampati: II  TM distance: >3 FB  Neck ROM: full  Dental    (+) edentulous    Pulmonary    (+) sleep apnea (biPap sometimes),   Cardiovascular     (+) hypertension, hyperlipidemia,   (-) dysrhythmias, angina, IVY      Neuro/Psych  (-) headaches, dizziness/light headedness  GI/Hepatic/Renal/Endo    (+)  GERD,  hepatitis C, diabetes mellitus (diet control),     ROS Comment: S/p gastric bypass    Musculoskeletal     Abdominal    Substance History      OB/GYN          Other                        Anesthesia Plan    ASA 3     general     intravenous induction   Anesthetic plan and risks discussed with patient.

## 2018-06-28 NOTE — ANESTHESIA POSTPROCEDURE EVALUATION
"Patient: Jermaine Denis    Procedure Summary     Date:  06/28/18 Room / Location:  [facility] OR [address] [place] [address] MAIN OR    Anesthesia Start:  1124 Anesthesia Stop:  1329    Procedure:  TOTAL SHOULDER REVERSE ARTHROPLASTY (Left Shoulder) Diagnosis:       Left rotator cuff tear arthropathy      (Left rotator cuff tear arthropathy [M12.812])    Surgeon:  Alex Ordaz MD Provider:  Gildardo Starr MD    Anesthesia Type:  general ASA Status:  3          Anesthesia Type: general  Last vitals  BP   110/71 (06/28/18 1350)   Temp   36.6 °C (97.8 °F) (06/28/18 1328)   Pulse   69 (06/28/18 1350)   Resp   16 (06/28/18 1350)     SpO2   94 % (06/28/18 1350)     Post Anesthesia Care and Evaluation    Patient location during evaluation: bedside  Patient participation: complete - patient participated  Level of consciousness: sleepy but conscious  Pain score: 0  Pain management: adequate  Airway patency: patent  Anesthetic complications: No anesthetic complications    Cardiovascular status: acceptable  Respiratory status: acceptable  Hydration status: acceptable    Comments: /71   Pulse 69   Temp 36.6 °C (97.8 °F) (Oral)   Resp 16   Ht 162.6 cm (64\")   Wt 98.7 kg (217 lb 9.5 oz)   SpO2 94%   BMI 37.35 kg/m²         "

## 2018-06-29 VITALS
BODY MASS INDEX: 37.15 KG/M2 | SYSTOLIC BLOOD PRESSURE: 105 MMHG | RESPIRATION RATE: 16 BRPM | HEART RATE: 71 BPM | TEMPERATURE: 97 F | OXYGEN SATURATION: 96 % | WEIGHT: 217.59 LBS | DIASTOLIC BLOOD PRESSURE: 58 MMHG | HEIGHT: 64 IN

## 2018-06-29 LAB
GLUCOSE BLDC GLUCOMTR-MCNC: 174 MG/DL (ref 70–130)
GLUCOSE BLDC GLUCOMTR-MCNC: 206 MG/DL (ref 70–130)
HCT VFR BLD AUTO: 36.5 % (ref 40.4–52.2)
HGB BLD-MCNC: 11.6 G/DL (ref 13.7–17.6)

## 2018-06-29 PROCEDURE — 85018 HEMOGLOBIN: CPT | Performed by: ORTHOPAEDIC SURGERY

## 2018-06-29 PROCEDURE — 82962 GLUCOSE BLOOD TEST: CPT

## 2018-06-29 PROCEDURE — 85014 HEMATOCRIT: CPT | Performed by: ORTHOPAEDIC SURGERY

## 2018-06-29 PROCEDURE — 25010000003 CEFAZOLIN IN DEXTROSE 2-4 GM/100ML-% SOLUTION: Performed by: ORTHOPAEDIC SURGERY

## 2018-06-29 PROCEDURE — G0009 ADMIN PNEUMOCOCCAL VACCINE: HCPCS | Performed by: ORTHOPAEDIC SURGERY

## 2018-06-29 PROCEDURE — 90732 PPSV23 VACC 2 YRS+ SUBQ/IM: CPT | Performed by: ORTHOPAEDIC SURGERY

## 2018-06-29 PROCEDURE — 99024 POSTOP FOLLOW-UP VISIT: CPT | Performed by: NURSE PRACTITIONER

## 2018-06-29 PROCEDURE — 25010000002 PNEUMOCOCCAL VAC POLYVALENT PER 0.5 ML: Performed by: ORTHOPAEDIC SURGERY

## 2018-06-29 RX ORDER — OXYCODONE AND ACETAMINOPHEN 7.5; 325 MG/1; MG/1
TABLET ORAL
Qty: 60 TABLET | Refills: 0
Start: 2018-06-29 | End: 2022-03-07

## 2018-06-29 RX ORDER — PSEUDOEPHEDRINE HCL 30 MG
100 TABLET ORAL 2 TIMES DAILY
Start: 2018-06-29

## 2018-06-29 RX ADMIN — PNEUMOCOCCAL VACCINE POLYVALENT 0.5 ML
25; 25; 25; 25; 25; 25; 25; 25; 25; 25; 25; 25; 25; 25; 25; 25; 25; 25; 25; 25; 25; 25; 25 INJECTION, SOLUTION INTRAMUSCULAR; SUBCUTANEOUS at 12:42

## 2018-06-29 RX ADMIN — FUROSEMIDE 20 MG: 20 TABLET ORAL at 06:30

## 2018-06-29 RX ADMIN — LISINOPRIL 10 MG: 10 TABLET ORAL at 10:17

## 2018-06-29 RX ADMIN — GABAPENTIN 800 MG: 400 CAPSULE ORAL at 10:16

## 2018-06-29 RX ADMIN — CEFAZOLIN SODIUM 2 G: 2 INJECTION, SOLUTION INTRAVENOUS at 02:30

## 2018-06-29 RX ADMIN — PANTOPRAZOLE SODIUM 40 MG: 40 TABLET, DELAYED RELEASE ORAL at 06:30

## 2018-06-29 RX ADMIN — FINASTERIDE 5 MG: 5 TABLET, FILM COATED ORAL at 06:30

## 2018-06-29 RX ADMIN — FERROUS SULFATE TAB 325 MG (65 MG ELEMENTAL FE) 325 MG: 325 (65 FE) TAB at 10:17

## 2018-06-29 RX ADMIN — OXYCODONE HYDROCHLORIDE AND ACETAMINOPHEN 2 TABLET: 7.5; 325 TABLET ORAL at 06:30

## 2018-06-29 RX ADMIN — OXYCODONE HYDROCHLORIDE AND ACETAMINOPHEN 2 TABLET: 7.5; 325 TABLET ORAL at 02:43

## 2018-06-29 RX ADMIN — ATORVASTATIN CALCIUM 10 MG: 10 TABLET, FILM COATED ORAL at 10:17

## 2018-06-29 RX ADMIN — OXYCODONE HYDROCHLORIDE AND ACETAMINOPHEN 2 TABLET: 7.5; 325 TABLET ORAL at 12:41

## 2018-06-29 RX ADMIN — DOCUSATE SODIUM 100 MG: 100 CAPSULE, LIQUID FILLED ORAL at 10:18

## 2018-07-13 ENCOUNTER — OFFICE VISIT (OUTPATIENT)
Dept: ORTHOPEDIC SURGERY | Facility: CLINIC | Age: 73
End: 2018-07-13

## 2018-07-13 ENCOUNTER — TELEPHONE (OUTPATIENT)
Dept: ORTHOPEDIC SURGERY | Facility: CLINIC | Age: 73
End: 2018-07-13

## 2018-07-13 VITALS — BODY MASS INDEX: 37.56 KG/M2 | WEIGHT: 220 LBS | HEIGHT: 64 IN | TEMPERATURE: 98.1 F

## 2018-07-13 DIAGNOSIS — Z09 SURGERY FOLLOW-UP: ICD-10-CM

## 2018-07-13 DIAGNOSIS — Z96.612 S/P REVERSE TOTAL SHOULDER ARTHROPLASTY, LEFT: Primary | ICD-10-CM

## 2018-07-13 PROCEDURE — 99024 POSTOP FOLLOW-UP VISIT: CPT | Performed by: ORTHOPAEDIC SURGERY

## 2018-07-13 PROCEDURE — 73030 X-RAY EXAM OF SHOULDER: CPT | Performed by: ORTHOPAEDIC SURGERY

## 2018-07-13 RX ORDER — OXYCODONE AND ACETAMINOPHEN 7.5; 325 MG/1; MG/1
1 TABLET ORAL EVERY 4 HOURS PRN
Qty: 50 TABLET | Refills: 0 | Status: ON HOLD | OUTPATIENT
Start: 2018-07-13 | End: 2023-02-27

## 2018-07-15 NOTE — PROGRESS NOTES
"Jermaine Denis : 1945 MRN: [id number] DATE: 7/15/2018    DIAGNOSIS:  2 week follow up left shoulder arthroplasty      SUBJECTIVE:  Patient returns today for 2 week follow up of left shoulder replacement. Patient reports doing well with no unusual complaints.      OBJECTIVE:    Temp 98.1 °F (36.7 °C) (Temporal Artery )   Ht 162.6 cm (64\")   Wt 99.8 kg (220 lb)   BMI 37.76 kg/m²     Exam:. The incision is well approximated. No erythema or drainage. Shoulder moves fluidly with pendulums . The arm is soft and nontender.  Good strength in hand and wrist.  Distal sensation intact.  Palpable radial pulse.    DIAGNOSTIC STUDIES    Xrays: 2 views of the left shoulder (AP, scapular Y) were ordered and reviewed for evaluation of shoulder replacement.  They demonstrate a well positioned, well aligned replacement without complicating factors noted.  In comparison with previous films, there has been no change.    ASSESSMENT: 2 week follow up left shoulder replacement.    PLAN:   1.  Begin PT per protocol--prescription given along with 2 copies of my protocol.  2.  Continue sling until next visit.  3.  Counseled patient about appropriate activity modifications and restrictions, including no driving at this point.    Alex Ordaz MD    "

## 2018-08-10 ENCOUNTER — OFFICE VISIT (OUTPATIENT)
Dept: ORTHOPEDIC SURGERY | Facility: CLINIC | Age: 73
End: 2018-08-10

## 2018-08-10 ENCOUNTER — HOSPITAL ENCOUNTER (OUTPATIENT)
Dept: PHYSICAL THERAPY | Facility: HOSPITAL | Age: 73
Setting detail: THERAPIES SERIES
Discharge: HOME | End: 2018-08-10
Attending: ORTHOPAEDIC SURGERY

## 2018-08-10 VITALS — TEMPERATURE: 98.4 F | WEIGHT: 220 LBS | BODY MASS INDEX: 37.56 KG/M2 | HEIGHT: 64 IN

## 2018-08-10 DIAGNOSIS — Z09 SURGERY FOLLOW-UP: ICD-10-CM

## 2018-08-10 DIAGNOSIS — Z96.612 S/P REVERSE TOTAL SHOULDER ARTHROPLASTY, LEFT: Primary | ICD-10-CM

## 2018-08-10 PROCEDURE — 99024 POSTOP FOLLOW-UP VISIT: CPT | Performed by: ORTHOPAEDIC SURGERY

## 2018-08-10 PROCEDURE — G8985 CARRY GOAL STATUS: HCPCS | Performed by: PHYSICAL THERAPIST

## 2018-08-10 PROCEDURE — 73030 X-RAY EXAM OF SHOULDER: CPT | Performed by: ORTHOPAEDIC SURGERY

## 2018-08-10 PROCEDURE — 97161 PT EVAL LOW COMPLEX 20 MIN: CPT | Performed by: PHYSICAL THERAPIST

## 2018-08-10 PROCEDURE — 97110 THERAPEUTIC EXERCISES: CPT | Performed by: PHYSICAL THERAPIST

## 2018-08-10 PROCEDURE — 97140 MANUAL THERAPY 1/> REGIONS: CPT | Performed by: PHYSICAL THERAPIST

## 2018-08-10 PROCEDURE — G8984 CARRY CURRENT STATUS: HCPCS | Performed by: PHYSICAL THERAPIST

## 2018-08-10 NOTE — PROGRESS NOTES
"Jermaine Denis : 1945 MRN: [id number] DATE: 8/10/2018    DIAGNOSIS: 6 week follow up left shoulder arthroplasty      SUBJECTIVE:  Patient returns today for 6 week follow up of left shoulder replacement. Patient reports doing well with no unusual complaints.     OBJECTIVE:    Temp 98.4 °F (36.9 °C) (Temporal Artery )   Ht 162.6 cm (64\")   Wt 99.8 kg (220 lb)   BMI 37.76 kg/m²     Exam:. The incision is well healed. No erythema or drainage. Shoulder moves fluidly with pendulums.  Motion is on track per protocol.  The arm is soft and nontender.  Good motor and sensory function.  Palpable distal pulses.     DIAGNOSTIC STUDIES    Xrays: 2 views of the left shoulder (AP, scapular Y) were ordered and reviewed for evaluation of shoulder replacement. They demonstrate a well positioned, well aligned replacement without complicating factors noted. In comparison with previous films there has been no change.    ASSESSMENT: 6 week follow up left shoulder replacement.    PLAN:   1.  Continue PT per protocol.  2.  Discontinue sling and begin working on progressing ROM as tolerated.  3.  Counseled patient about appropriate activity modifications and restrictions--released to drive at this point.  4.  Follow up in 3 months    Alex Ordaz MD    "

## 2018-08-10 NOTE — THERAPY EVALUATION
Outpatient Physical Therapy Ortho Initial Evaluation  [facility]     Patient Name: Jermaine Denis  : 1945  MRN: [id number]  Today's Date: 8/10/2018      Visit Date: 08/10/2018    Patient Active Problem List   Diagnosis   • Rotator cuff tear arthropathy, right   • Rotator cuff arthropathy   • Left rotator cuff tear arthropathy   • Osteoarthritis of left shoulder        Past Medical History:   Diagnosis Date   • Arthritis    • BPH (benign prostatic hyperplasia)    • Diabetes mellitus (CMS/HCC)     no meds now diet controlled was on insulin and then meds   • Elevated cholesterol    • GERD (gastroesophageal reflux disease)    • High cholesterol    • History of hepatitis C     treated medically   • History of kidney stones    • History of transfusion    • Hypertension    • Psoriasis    • Shoulder pain    • Sleep apnea     bipap   • Urethral stricture     has F/C in place  caths self prn   • UTI (urinary tract infection)     freq uti        Past Surgical History:   Procedure Laterality Date   • ANKLE SURGERY Right     orif with hardware   • CARPAL TUNNEL RELEASE Left    • COLONOSCOPY     • ENDOSCOPY     • GASTRIC BYPASS         • HAND SURGERY Right     thumb    • JOINT REPLACEMENT      lt knee   • TOTAL SHOULDER ARTHROPLASTY W/ DISTAL CLAVICLE EXCISION Right 2018    Procedure: Reverse Total Shoulder Arthroplasty;  Surgeon: Alex Ordaz MD;  Location: [facility];  Service:    • TOTAL SHOULDER ARTHROPLASTY W/ DISTAL CLAVICLE EXCISION Left 2018    Procedure: TOTAL SHOULDER REVERSE ARTHROPLASTY;  Surgeon: Alex Ordaz MD;  Location: [facility] OR;  Service: Orthopedics       Visit Dx:     ICD-10-CM ICD-9-CM   1. S/P reverse total shoulder arthroplasty, left Z96.612 V43.61             Patient History     Row Name 08/10/18 0900             History    Chief Complaint Difficulty with daily activities;Joint stiffness;Joint swelling;Muscle tenderness;Muscle weakness;Pain  -LC      Type of  Pain Shoulder pain   LEFT  -LC      Date Current Problem(s) Began 06/28/18  -      Brief Description of Current Complaint Pt is 73 y.o. male who presents to PT s/p LEFT reverse total shoulder arthroplasty on 6/28/18. He presents in sling. pt has limitations of no AROM or no lifting objects heavier than coffee cup. He has further restrictions of no extension or sudden reaching as well as no ER past 20 degrees or reaching behind back. Pt indicates that he has not fully been adhering to his shoulder protocol and has been moving his L shoulder around during the day. Pt understands that he should not be performing AROM at this time. He is just now 6 weeks out, but is still not allow to performed movement with L shoulder. Pt reports pain level is very low at this time. Pt reports having R total shoulder on Jan 18 2018.  He reports recovery from this surgery was excellent.  -LC      Previous treatment for THIS PROBLEM Surgery  -      Surgery Date: 06/28/18  -      Patient/Caregiver Goals Relieve pain;Return to prior level of function;Improve mobility;Improve strength;Know what to do to help the symptoms  -LC      Patient's Rating of General Health Very good  -LC      Hand Dominance right-handed  -LC      Occupation/sports/leisure activities walking  -      How has patient tried to help current problem? ice  -LC         Pain     Pain Location Shoulder   LEFT  -LC      Pain at Present 1  -LC      Pain at Best 1  -LC      Pain at Worst 6  -LC      Pain Frequency Intermittent  -LC      Pain Description Sore  -LC      What Performance Factors Make the Current Problem(s) WORSE? any movement of L shoulder  -LC      Difficulties with ADL's? pushing, pulling, reaching, carrying, reaching overhead, dressing, bathing  -LC         Fall Risk Assessment    Any falls in the past year: No  -LC         Daily Activities    Primary Language English  -      Pt Participated in POC and Goals Yes  -         Safety    Are you being  hurt, hit, or frightened by anyone at home or in your life? No  -LC      Are you being neglected by a caregiver No  -LC        User Key  (r) = Recorded By, (t) = Taken By, (c) = Cosigned By    Initials Name Provider Type    Chico Howell, PT DPT Physical Therapist                PT Ortho     Row Name 08/10/18 0900       General ROM    RT Upper Ext Rt Shoulder ABduction;Rt Shoulder Flexion;Rt Shoulder External Rotation;Rt Shoulder Internal Rotation  -LC    LT Upper Ext Lt Shoulder ABduction;Lt Shoulder Flexion;Lt Shoulder External Rotation;Lt Shoulder Internal Rotation  -LC       Right Upper Ext    Rt Shoulder Abduction AROM 135  -LC    Rt Shoulder Abduction PROM 140  -LC    Rt Shoulder Flexion AROM 140  -LC    Rt Shoulder Flexion PROM 145  -LC    Rt Shoulder External Rotation AROM T1  -LC    Rt Shoulder External Rotation PROM 90  -LC    Rt Shoulder Internal Rotation AROM L5  -LC    Rt Shoulder Internal Rotation PROM 90  -LC       Left Upper Ext    Lt Shoulder Abduction PROM 120  -LC    Lt Shoulder Flexion PROM 130  -LC    Lt Shoulder External Rotation PROM 20  -LC    Lt Shoulder Internal Rotation PROM 40  -LC       Left Shoulder (Manual Muscle Testing)    Left Shoulder Manual Muscle Testing (MMT) flexion;abduction;external rotation;internal rotation  -LC       Right Shoulder (Manual Muscle Testing)    Right Shoulder Manual Muscle Testing (MMT) flexion;abduction;external rotation;internal rotation  -LC    MMT: Flexion, Right Shoulder flexion  -LC    MMT, Gross Movement: Right Shoulder Flexion (4/5) good  -LC    MMT: ABduction, Right Shoulder abduction  -LC    MMT, Gross Movement: Right Shoulder ABduction (4/5) good  -LC    MMT: Internal Rotation, Right Shoulder internal rotation  -LC    MMT, Gross Movement: Right Shoulder Internal Rotation (4/5) good  -LC    MMT: External Rotation, Right Shoulder external rotation  -LC    MMT, Gross Movement: Right Shoulder External Rotation (4/5) good  -LC      User Key  (r) =  Recorded By, (t) = Taken By, (c) = Cosigned By    Initials Name Provider Type    Chico Howell, PT DPT Physical Therapist                      Therapy Education  Given: HEP, Symptoms/condition management, Pain management  Program: New  How Provided: Verbal, Demonstration, Written  Provided to: Patient  Level of Understanding: Teach back education performed, Verbalized, Demonstrated           PT OP Goals     Row Name 08/10/18 1100          PT Short Term Goals    STG 1 Pt will be independent with HEP to improve L shoulder AROM and strength to allow full return to ADL's with no limitations.  -LC     STG 1 Progress New  -LC     STG 2 Pt will have L shoulder ROM flex 145, , IR 90, ER 90  -LC     STG 2 Progress New  -LC     STG 3 Pt will have L shoulder MMT grossly 3/5.  -LC     STG 3 Progress New  -LC        Long Term Goals    LTG 1 Pt will have 15% improvement on DASH.  -LC     LTG 1 Progress New  -LC     LTG 2 Pt will have L shoulder AROM flex 145, , 90 IR, 90 ER.  -LC     LTG 2 Progress New  -LC     LTG 3 Pt will have L shoulder MMT grossly 4/5.  -LC     LTG 3 Progress New  -LC        Time Calculation    PT Goal Re-Cert Due Date 09/10/18  -       User Key  (r) = Recorded By, (t) = Taken By, (c) = Cosigned By    Initials Name Provider Type    Chico Howell, PT DPT Physical Therapist                PT Assessment/Plan     Row Name 08/10/18 1200          PT Assessment    Functional Limitations Performance in self-care ADL;Performance in leisure activities;Limitations in functional capacity and performance  -     Impairments Joint mobility;Pain;Muscle strength;Range of motion;Motor function;Endurance  -     Assessment Comments Pt is 73 y.o. male who presents to PT s/p LEFT reverse total shoulder arthroplasty on 6/28/18. He presents in sling. pt has limitations of no AROM or no lifting objects heavier than coffee cup. He has further restrictions of no extension or sudden reaching as well as no  ER past 20 degrees or reaching behind back. Pt indicates that he has not fully been adhering to his shoulder protocol and has been moving his L shoulder around during the day. Pt understands that he should not be performing AROM at this time. He is just now 6 weeks out, but is still not allow to performed movement with L shoulder. Pt reports pain level is very low at this time. Pt reports having R total shoulder on Jan 18 2018. He reports recovery from this surgery was excellent. Pt tolerated PROM well and has good soft end feel with no reported pain only feeling of stretch. He is where his protocol indicates he should be at 6 weeks post op. Pt educated on delto isometric for flex and ABD and reported non pain with these exercises. He was issued written copy of HEp.  -     Please refer to paper survey for additional self-reported information Yes  -LC     Rehab Potential Excellent  -     Patient/caregiver participated in establishment of treatment plan and goals Yes  -LC     Patient would benefit from skilled therapy intervention Yes  -LC        PT Plan    PT Frequency 2x/week  -     Predicted Duration of Therapy Intervention (Therapy Eval) 4 to 6 weeks  -     Planned CPT's? PT EVAL LOW COMPLEXITY: 76383;PT RE-EVAL: 34433;PT NEUROMUSC RE-EDUCATION EA 15 MIN: 11560;PT MANUAL THERAPY EA 15 MIN: 21804;PT THER ACT EA 15 MIN: 48173;PT THER PROC EA 15 MIN: 95878;PT ELECTRICAL STIM UNATTEND: ;PT HOT/COLD PACK WC NONMCARE: 36116  -     Physical Therapy Interventions (Optional Details) home exercise program;patient/family education;stretching;strengthening;modalities;manual therapy techniques;ROM (Range of Motion)  -     PT Plan Comments Pt requires skilled therapy to improve L shoulder AROM and strength to allow full return to ADL's with no limitations.  -       User Key  (r) = Recorded By, (t) = Taken By, (c) = Cosigned By    Initials Name Provider Type    Chico Howell, PT DPT Physical Therapist                   Exercises     Row Name 08/10/18 1100             Total Minutes    98217 - PT Therapeutic Exercise Minutes 15  -LC      74637 - PT Manual Therapy Minutes 15  -LC         Exercise 1    Exercise Name 1 supine assisted overhead flex  -LC      Sets 1 2  -LC      Reps 1 5  -LC      Time 1 5  -LC         Exercise 2    Exercise Name 2 iso flex and ABD  -LC      Sets 2 2  -LC      Reps 2 5  -LC      Time 2 5  -LC         Exercise 3    Exercise Name 3  ROM progression  -LC      Sets 3 2  -LC      Reps 3 5  -LC      Time 3 5  -LC        User Key  (r) = Recorded By, (t) = Taken By, (c) = Cosigned By    Initials Name Provider Type    LC Elva Chico M, PT DPT Physical Therapist           Manual Rx (last 36 hours)      Manual Treatments     Row Name 08/10/18 1100             Total Minutes    98569 - PT Manual Therapy Minutes 15  -LC         Manual Rx 1    Manual Rx 1 Location L shoulder  -LC      Manual Rx 1 Type PROM, oscillations, GH glides  -LC      Manual Rx 1 Duration 15 min  -LC        User Key  (r) = Recorded By, (t) = Taken By, (c) = Cosigned By    Initials Name Provider Type    LC Chico Stevenson M, PT DPT Physical Therapist                      Outcome Measure Options: Disabilities of the Arm, Shoulder, and Hand (DASH)  DASH  Open a tight or new jar.: Severe Difficulty  Write: No Difficulty  Turn a key: Severe Difficulty  Prepare a meal: No Difficulty  Push open a heavy door: Severe Difficulty  Place an object on a shelf above your head: Severe Difficulty  Do heavy household chores (e.g., wash walls, wash floors): Moderate Difficulty  Garden or do yard work: Mild Difficulty  Make a bed: No Difficulty  Carry a shopping bag or briefcase: Moderate Difficulty  Carry a heavy object (over 10 lbs): Moderate Difficulty  Change a lightbulb overhead: Severe Difficulty  Wash or blow dry your hair: No Difficulty  Wash your back: No Difficulty  Put on a pullover sweater: No Difficulty  Use a knife to cut food:  Moderate Difficulty  Recreational activities in which require little effort (e.g., cardplaying, knitting, etc.): No Difficulty  Recreational activities in which you take some force or impact through your arm, should or hand (e.g. golf, hammering, tennis, etc.): Unable  Recreational Activities in which you move your arm freely (e.g., frisbee, badminton, etc.): Moderate Difficulty  Manage transportation needs (getting from one place to another): Moderate Difficulty  Sexual Activities: No Difficulty  During the past week, to what extent has your arm, shoulder, or hand problem interfered with your normal social activites with family, friends, neighbors or groups?: Slightly  During the past week, were you limited in your work or other regular daily activities as a result of your arm, shoulder or hand problem?: Moderately Limited  Arm, Shoulder, or hand pain: Moderate  Arm, shoulder or hand pain when you performed any specific activity: Mild  Tingling (pins and needles) in your arm, shoulder, or hand: Moderate  Weakness in your arm, shoulder or hand: Severe  Stiffness in your arm, shoulder or hand: Moderate  During the past week, how much difficulty have you had sleeping because of the pain in your arm, shoulder or hand?: Mild Difficulty  I feel less capable, less confident or less useful because of my arm, shoulder or hand problem: Strongly disagree  DASH Sum : 76  Number of Questions Answered: 30  DASH Score: 38.33         Time Calculation:     Therapy Suggested Charges     Code   Minutes Charges    53772 (CPT®)  Pt Neuromusc Re Education Ea 15 Min      22111 (CPT®)  Pt Ther Proc Ea 15 Min 15 1    10799 (CPT®) Hc Gait Training Ea 15 Min      96373 (CPT®)  Pt Therapeutic Act Ea 15 Min      16517 (CPT®)  Pt Manual Therapy Ea 15 Min 15 1    82664 (CPT®)  Pt Ther Massage- Per 15 Min      35317 (CPT®)  Pt Iontophoresis Ea 15 Min      88884 (CPT®)  Pt Elec Stim Ea-Per 15 Min      28904 (CPT®)  Pt Ultrasound Ea  15 Min      42087 (CPT®) Hc Pt Self Care/Mgmt/Train Ea 15 Min      Total  30 2          Start Time: 0945  Stop Time: 1030  Time Calculation (min): 45 min  Total Timed Code Minutes- PT: 45 minute(s)     Therapy Charges for Today     Code Description Service Date Service Provider Modifiers Qty    12919629405 HC PT CARRY MOV HAND OBJ CURRENT 8/10/2018 Chico Stevenson, PT DPT GP, CJ 1    75936291506 HC PT CARRY MOV HAND OBJ PROJECTED 8/10/2018 Chico Stevenson, PT DPT GP, CI 1    60250116465 HC PT THER PROC EA 15 MIN 8/10/2018 Chico Stevenson, PT DPT GP 1    68179092043 HC PT MANUAL THERAPY EA 15 MIN 8/10/2018 Chico Stevenson, PT DPT GP 1    85483252838 HC PT EVAL LOW COMPLEXITY 1 8/10/2018 Chico Stevenson, PT DPT GP 1          PT G-Codes  PT Professional Judgement Used?: Yes  Outcome Measure Options: Disabilities of the Arm, Shoulder, and Hand (DASH)  Score: 38%  Functional Limitation: Carrying, moving and handling objects  Carrying, Moving and Handling Objects Current Status (): At least 20 percent but less than 40 percent impaired, limited or restricted  Carrying, Moving and Handling Objects Goal Status (): At least 1 percent but less than 20 percent impaired, limited or restricted         Chico Stevenson PT DPT  8/10/2018

## 2018-08-14 ENCOUNTER — HOSPITAL ENCOUNTER (OUTPATIENT)
Dept: PHYSICAL THERAPY | Facility: HOSPITAL | Age: 73
Setting detail: THERAPIES SERIES
Discharge: HOME | End: 2018-08-14
Attending: ORTHOPAEDIC SURGERY

## 2018-08-14 DIAGNOSIS — Z96.612 S/P REVERSE TOTAL SHOULDER ARTHROPLASTY, LEFT: Primary | ICD-10-CM

## 2018-08-14 PROCEDURE — 97140 MANUAL THERAPY 1/> REGIONS: CPT | Performed by: PHYSICAL THERAPIST

## 2018-08-14 PROCEDURE — 97110 THERAPEUTIC EXERCISES: CPT | Performed by: PHYSICAL THERAPIST

## 2018-08-14 NOTE — THERAPY TREATMENT NOTE
Outpatient Physical Therapy Ortho Treatment Note  [facility]     Patient Name: Jermaine Denis  : 1945  MRN: [id number]  Today's Date: 2018      Visit Date: 2018    Visit Dx:    ICD-10-CM ICD-9-CM   1. S/P reverse total shoulder arthroplasty, left Z96.612 V43.61       Patient Active Problem List   Diagnosis   • Rotator cuff tear arthropathy, right   • Rotator cuff arthropathy   • Left rotator cuff tear arthropathy   • Osteoarthritis of left shoulder        Past Medical History:   Diagnosis Date   • Arthritis    • BPH (benign prostatic hyperplasia)    • Diabetes mellitus (CMS/HCC)     no meds now diet controlled was on insulin and then meds   • Elevated cholesterol    • GERD (gastroesophageal reflux disease)    • High cholesterol    • History of hepatitis C     treated medically   • History of kidney stones    • History of transfusion    • Hypertension    • Psoriasis    • Shoulder pain    • Sleep apnea     bipap   • Urethral stricture     has F/C in place  caths self prn   • UTI (urinary tract infection)     freq uti        Past Surgical History:   Procedure Laterality Date   • ANKLE SURGERY Right     orif with hardware   • CARPAL TUNNEL RELEASE Left    • COLONOSCOPY     • ENDOSCOPY     • GASTRIC BYPASS         • HAND SURGERY Right     thumb    • JOINT REPLACEMENT      lt knee   • TOTAL SHOULDER ARTHROPLASTY W/ DISTAL CLAVICLE EXCISION Right 2018    Procedure: Reverse Total Shoulder Arthroplasty;  Surgeon: Alex Ordaz MD;  Location: [facility];  Service:    • TOTAL SHOULDER ARTHROPLASTY W/ DISTAL CLAVICLE EXCISION Left 2018    Procedure: TOTAL SHOULDER REVERSE ARTHROPLASTY;  Surgeon: Alex Ordaz MD;  Location: [facility];  Service: Orthopedics                             PT Assessment/Plan     Row Name 18 1147          PT Assessment    Assessment Comments Patient without sling following MD visit last week.  He has mild pain with end ROM scaption and mild  discomfort with isometrics but tolerating well.  He is very motivated and should do well but will need to be cautious not to overdo.  -RA        PT Plan    PT Plan Comments Continue with skilled therapy for L reverse TSA post op recovery to restore ROM, strength, and function   -RA       User Key  (r) = Recorded By, (t) = Taken By, (c) = Cosigned By    Initials Name Provider Type    Shaila Miguel, PT Physical Therapist                    Exercises     Row Name 08/14/18 1100             Subjective Comments    Subjective Comments MD WILSON/Damon sling at f/u last week.  Shoulder kind of sore from the ex where I raise it OH with my R hand.  Overall doing fairly well.  -RA         Subjective Pain    Able to rate subjective pain? yes  -RA      Pre-Treatment Pain Level 2  -RA         Total Minutes    41408 - PT Therapeutic Exercise Minutes 24  -RA      57611 - PT Manual Therapy Minutes 18  -RA         Exercise 1    Exercise Name 1 supine assisted overhead flex  -RA      Sets 1 2  -RA      Reps 1 5  -RA      Time 1 5s  -RA         Exercise 2    Exercise Name 2 iso flex and ABD  -RA      Sets 2 2  -RA      Reps 2 5  -RA      Time 2 5s  -RA         Exercise 3    Exercise Name 3  ROM progression  -RA      Sets 3 2  -RA      Reps 3 5  -RA      Time 3 5s  -RA         Exercise 4    Exercise Name 4 elbow flex/ext  -RA      Sets 4 2  -RA      Reps 4 10  -RA         Exercise 5    Exercise Name 5 FA pronation/supination   -RA      Sets 5 2  -RA      Reps 5 10  -RA         Exercise 6    Exercise Name 6 isometric extension (avoiding extension past neutral)  -RA      Sets 6 2  -RA      Reps 6 5  -RA      Time 6 5s  -RA        User Key  (r) = Recorded By, (t) = Taken By, (c) = Cosigned By    Initials Name Provider Type    Shaila Miguel, PT Physical Therapist                        Manual Rx (last 36 hours)      Manual Treatments     Row Name 08/14/18 1100             Total Minutes    42349 - PT Manual Therapy Minutes 18  -RA          Manual Rx 1    Manual Rx 1 Location L shoulder  -RA      Manual Rx 1 Type PROM, oscillations, GH glides, STM   -RA      Manual Rx 1 Duration --  -RA        User Key  (r) = Recorded By, (t) = Taken By, (c) = Cosigned By    Initials Name Provider Type    Shaila Miguel, PT Physical Therapist              Therapy Education  Education Details: precautions/restrictions reviewed   Given: HEP, Symptoms/condition management, Pain management  Program: Reinforced  How Provided: Verbal, Demonstration  Provided to: Patient  Level of Understanding: Verbalized, Teach back education performed              Time Calculation:   Start Time: 1058  Stop Time: 1140  Time Calculation (min): 42 min  Therapy Suggested Charges     Code   Minutes Charges    30363 (CPT®) Hc Pt Neuromusc Re Education Ea 15 Min      64053 (CPT®) Hc Pt Ther Proc Ea 15 Min 24 2    50897 (CPT®) Hc Gait Training Ea 15 Min      64798 (CPT®) Hc Pt Therapeutic Act Ea 15 Min      79633 (CPT®) Hc Pt Manual Therapy Ea 15 Min 18 1    83545 (CPT®) Hc Pt Ther Massage- Per 15 Min      83824 (CPT®) Hc Pt Iontophoresis Ea 15 Min      17179 (CPT®) Hc Pt Elec Stim Ea-Per 15 Min      34711 (CPT®) Hc Pt Ultrasound Ea 15 Min      82693 (CPT®) Hc Pt Self Care/Mgmt/Train Ea 15 Min      56416 (CPT®) Hc Pt Prosthetic (S) Train Initial Encounter, Each 15 Min      09719 (CPT®) Hc Orthotic(S) Mgmt/Train Initial Encounter, Each 15min      42486 (CPT®) Hc Pt Aquatic Therapy Ea 15 Min      06025 (CPT®) Hc Pt Orthotic(S)/Prosthetic(S) Encounter, Each 15 Min      Total  42 3        Therapy Charges for Today     Code Description Service Date Service Provider Modifiers Qty    06382189935 HC PT THER PROC EA 15 MIN 8/14/2018 Shaila Gordon, PT GP 2    56588238891 HC PT MANUAL THERAPY EA 15 MIN 8/14/2018 Shaila Gordon, PT GP 1                    Shaila Gordon, PT  8/14/2018

## 2018-08-17 ENCOUNTER — HOSPITAL ENCOUNTER (OUTPATIENT)
Dept: PHYSICAL THERAPY | Facility: HOSPITAL | Age: 73
Setting detail: THERAPIES SERIES
Discharge: HOME | End: 2018-08-17
Attending: ORTHOPAEDIC SURGERY

## 2018-08-17 DIAGNOSIS — Z96.612 S/P REVERSE TOTAL SHOULDER ARTHROPLASTY, LEFT: Primary | ICD-10-CM

## 2018-08-17 PROCEDURE — 97140 MANUAL THERAPY 1/> REGIONS: CPT | Performed by: PHYSICAL THERAPIST

## 2018-08-17 PROCEDURE — 97110 THERAPEUTIC EXERCISES: CPT | Performed by: PHYSICAL THERAPIST

## 2018-08-17 NOTE — THERAPY TREATMENT NOTE
Outpatient Physical Therapy Ortho Treatment Note  [facility]     Patient Name: Jermaine Denis  : 1945  MRN: [id number]  Today's Date: 2018      Visit Date: 2018    Visit Dx:    ICD-10-CM ICD-9-CM   1. S/P reverse total shoulder arthroplasty, left Z96.612 V43.61       Patient Active Problem List   Diagnosis   • Rotator cuff tear arthropathy, right   • Rotator cuff arthropathy   • Left rotator cuff tear arthropathy   • Osteoarthritis of left shoulder        Past Medical History:   Diagnosis Date   • Arthritis    • BPH (benign prostatic hyperplasia)    • Diabetes mellitus (CMS/HCC)     no meds now diet controlled was on insulin and then meds   • Elevated cholesterol    • GERD (gastroesophageal reflux disease)    • High cholesterol    • History of hepatitis C     treated medically   • History of kidney stones    • History of transfusion    • Hypertension    • Psoriasis    • Shoulder pain    • Sleep apnea     bipap   • Urethral stricture     has F/C in place  caths self prn   • UTI (urinary tract infection)     freq uti        Past Surgical History:   Procedure Laterality Date   • ANKLE SURGERY Right     orif with hardware   • CARPAL TUNNEL RELEASE Left    • COLONOSCOPY     • ENDOSCOPY     • GASTRIC BYPASS         • HAND SURGERY Right     thumb    • JOINT REPLACEMENT      lt knee   • TOTAL SHOULDER ARTHROPLASTY W/ DISTAL CLAVICLE EXCISION Right 2018    Procedure: Reverse Total Shoulder Arthroplasty;  Surgeon: Alex Ordaz MD;  Location: [facility];  Service:    • TOTAL SHOULDER ARTHROPLASTY W/ DISTAL CLAVICLE EXCISION Left 2018    Procedure: TOTAL SHOULDER REVERSE ARTHROPLASTY;  Surgeon: Alex Ordaz MD;  Location: [facility];  Service: Orthopedics             PT Ortho     Row Name 18 1200       Subjective Comments    Subjective Comments Pt reports only mild soreness. HEP is going well and his shoulder feels much more flexible. He is being cautious regarding  precautions for L shoulder arthroplasty.  -LC       Subjective Pain    Able to rate subjective pain? yes  -LC    Pre-Treatment Pain Level 2  -LC    Post-Treatment Pain Level 2  -LC       Left Upper Ext    Lt Shoulder Abduction PROM 138  -LC    Lt Shoulder Flexion PROM 143  -LC    Lt Shoulder External Rotation PROM 45  -LC    Lt Shoulder Internal Rotation PROM 65  -LC      User Key  (r) = Recorded By, (t) = Taken By, (c) = Cosigned By    Initials Name Provider Type    Chico Howell, PT DPT Physical Therapist                            PT Assessment/Plan     Row Name 08/17/18 1248          PT Assessment    Functional Limitations Performance in self-care ADL;Performance in leisure activities;Limitations in functional capacity and performance  -     Impairments Joint mobility;Pain;Muscle strength;Range of motion;Motor function;Endurance  -     Assessment Comments Pt status regarding L shoulder arthroplasty remains evolving. Pain is 2/10. ROM continues to improve within restrictions per protocol. Pt tolerated increased PROM well with no complaints. Shoulder has open endfeel. He is progressing very well.  -        PT Plan    PT Plan Comments Pt requires continued skilled therapy intevention to include therex, manual, strengthening, stretching, and ROM.  -       User Key  (r) = Recorded By, (t) = Taken By, (c) = Cosigned By    Initials Name Provider Type    Chico Howell, PT DPT Physical Therapist                    Exercises     Row Name 08/17/18 1200 08/17/18 1100          Subjective Comments    Subjective Comments Pt reports only mild soreness. HEP is going well and his shoulder feels much more flexible. He is being cautious regarding precautions for L shoulder arthroplasty.  -LC  --        Subjective Pain    Able to rate subjective pain? yes  -LC  --     Pre-Treatment Pain Level 2  -LC  --     Post-Treatment Pain Level 2  -LC  --        Total Minutes    35890 - PT Therapeutic Exercise Minutes 30  -LC   --     59064 - PT Manual Therapy Minutes  -- 15  -LC        Exercise 1    Exercise Name 1 supine assisted overhead flex  -LC  --     Sets 1 2  -LC  --     Reps 1 5  -LC  --     Time 1 5s  -LC  --        Exercise 2    Exercise Name 2 iso flex and ABD  -LC  --     Sets 2 2  -LC  --     Reps 2 5  -LC  --     Time 2 5s  -LC  --        Exercise 3    Exercise Name 3  ROM progression  -LC  --     Sets 3 2  -LC  --     Reps 3 5  -LC  --     Time 3 5s  -LC  --        Exercise 4    Exercise Name 4 elbow flex/ext  -LC  --     Sets 4 2  -LC  --     Reps 4 10  -LC  --        Exercise 5    Exercise Name 5 FA pronation/supination   -LC  --     Sets 5 2  -LC  --     Reps 5 10  -LC  --        Exercise 6    Exercise Name 6 isometric extension (avoiding extension past neutral)  -LC  --     Sets 6 2  -LC  --     Reps 6 5  -LC  --     Time 6 5s  -LC  --        Exercise 7    Exercise Name 7 pulleys flex, scaption  -LC  --     Sets 7 2  -LC  --     Reps 7 8  -LC  --        Exercise 8    Exercise Name 8 rhythmic stab with manual perturbatoins in supine  -LC  --     Reps 8 6  -LC  --     Time 8 30 seconds  -LC  --       User Key  (r) = Recorded By, (t) = Taken By, (c) = Cosigned By    Initials Name Provider Type    Chico Howell, PT DPT Physical Therapist                        Manual Rx (last 36 hours)      Manual Treatments     Row Name 08/17/18 1100             Total Minutes    78355 - PT Manual Therapy Minutes 15  -LC         Manual Rx 1    Manual Rx 1 Location L shoulder  -LC      Manual Rx 1 Type PROM, oscillations, GH glides, STM   -LC      Manual Rx 1 Duration 15 min  -LC        User Key  (r) = Recorded By, (t) = Taken By, (c) = Cosigned By    Initials Name Provider Type    LC Chico Stevenson, PT DPT Physical Therapist                PT OP Goals     Row Name 08/17/18 1200          PT Short Term Goals    STG 1 Pt will be independent with HEP to improve L shoulder AROM and strength to allow full return to ADL's with no  limitations.  -LC     STG 1 Progress Progressing  -LC     STG 2 Pt will have L shoulder ROM flex 145, , IR 90, ER 90  -LC     STG 2 Progress Progressing  -LC     STG 3 Pt will have L shoulder MMT grossly 3/5.  -LC     STG 3 Progress Progressing  -LC        Long Term Goals    LTG 1 Pt will have 15% improvement on DASH.  -LC     LTG 1 Progress Progressing  -LC     LTG 2 Pt will have L shoulder AROM flex 145, , 90 IR, 90 ER.  -LC     LTG 2 Progress Progressing  -LC     LTG 3 Pt will have L shoulder MMT grossly 4/5.  -LC     LTG 3 Progress Progressing  -LC       User Key  (r) = Recorded By, (t) = Taken By, (c) = Cosigned By    Initials Name Provider Type    Chico Howell, PT DPT Physical Therapist          Therapy Education  Given: HEP, Symptoms/condition management, Pain management  Program: Reinforced  How Provided: Verbal, Demonstration  Provided to: Patient  Level of Understanding: Verbalized, Teach back education performed              Time Calculation:   Start Time: 1155  Stop Time: 1240  Time Calculation (min): 45 min  Total Timed Code Minutes- PT: 45 minute(s)  Therapy Suggested Charges     Code   Minutes Charges    66076 (CPT®) Hc Pt Neuromusc Re Education Ea 15 Min      98538 (CPT®) Hc Pt Ther Proc Ea 15 Min 30 2    84253 (CPT®) Hc Gait Training Ea 15 Min      62877 (CPT®) Hc Pt Therapeutic Act Ea 15 Min      34946 (CPT®) Hc Pt Manual Therapy Ea 15 Min 15 1    47160 (CPT®) Hc Pt Ther Massage- Per 15 Min      45640 (CPT®) Hc Pt Iontophoresis Ea 15 Min      18480 (CPT®) Hc Pt Elec Stim Ea-Per 15 Min      51469 (CPT®) Hc Pt Ultrasound Ea 15 Min      73391 (CPT®) Hc Pt Self Care/Mgmt/Train Ea 15 Min      70444 (CPT®) Hc Pt Prosthetic (S) Train Initial Encounter, Each 15 Min      68490 (CPT®) Hc Orthotic(S) Mgmt/Train Initial Encounter, Each 15min      22274 (CPT®) Hc Pt Aquatic Therapy Ea 15 Min      58890 (CPT®) Hc Pt Orthotic(S)/Prosthetic(S) Encounter, Each 15 Min      Total  45 3         Therapy Charges for Today     Code Description Service Date Service Provider Modifiers Qty    02733079006  PT THER PROC EA 15 MIN 8/17/2018 Chico Stevenson, PT DPT GP 2    [id number]  PT MANUAL THERAPY EA 15 MIN 8/17/2018 Chico Stevenson, PT DPT GP 1                    Chico Stevenson, PT DPT  8/17/2018

## 2018-08-22 ENCOUNTER — HOSPITAL ENCOUNTER (OUTPATIENT)
Dept: PHYSICAL THERAPY | Facility: HOSPITAL | Age: 73
Setting detail: THERAPIES SERIES
Discharge: HOME | End: 2018-08-22
Attending: ORTHOPAEDIC SURGERY

## 2018-08-22 DIAGNOSIS — Z96.612 S/P REVERSE TOTAL SHOULDER ARTHROPLASTY, LEFT: Primary | ICD-10-CM

## 2018-08-22 PROCEDURE — 97110 THERAPEUTIC EXERCISES: CPT

## 2018-08-22 PROCEDURE — 97140 MANUAL THERAPY 1/> REGIONS: CPT

## 2018-08-22 NOTE — THERAPY TREATMENT NOTE
Outpatient Physical Therapy Ortho Treatment Note  [facility]     Patient Name: Jermaine Denis  : 1945  MRN: [id number]  Today's Date: 2018      Visit Date: 2018    Visit Dx:    ICD-10-CM ICD-9-CM   1. S/P reverse total shoulder arthroplasty, left Z96.612 V43.61       Patient Active Problem List   Diagnosis   • Rotator cuff tear arthropathy, right   • Rotator cuff arthropathy   • Left rotator cuff tear arthropathy   • Osteoarthritis of left shoulder        Past Medical History:   Diagnosis Date   • Arthritis    • BPH (benign prostatic hyperplasia)    • Diabetes mellitus (CMS/HCC)     no meds now diet controlled was on insulin and then meds   • Elevated cholesterol    • GERD (gastroesophageal reflux disease)    • High cholesterol    • History of hepatitis C     treated medically   • History of kidney stones    • History of transfusion    • Hypertension    • Psoriasis    • Shoulder pain    • Sleep apnea     bipap   • Urethral stricture     has F/C in place  caths self prn   • UTI (urinary tract infection)     freq uti        Past Surgical History:   Procedure Laterality Date   • ANKLE SURGERY Right     orif with hardware   • CARPAL TUNNEL RELEASE Left    • COLONOSCOPY     • ENDOSCOPY     • GASTRIC BYPASS         • HAND SURGERY Right     thumb    • JOINT REPLACEMENT      lt knee   • TOTAL SHOULDER ARTHROPLASTY W/ DISTAL CLAVICLE EXCISION Right 2018    Procedure: Reverse Total Shoulder Arthroplasty;  Surgeon: Alex Ordaz MD;  Location: [facility];  Service:    • TOTAL SHOULDER ARTHROPLASTY W/ DISTAL CLAVICLE EXCISION Left 2018    Procedure: TOTAL SHOULDER REVERSE ARTHROPLASTY;  Surgeon: Alex Ordaz MD;  Location: [facility];  Service: Orthopedics             PT Ortho     Row Name 18 1100       Subjective Comments    Subjective Comments pt states he is using the left arm but being careful with it.  -SI       Subjective Pain    Able to rate subjective pain? yes   -SI    Subjective Pain Comment minimal left shoulder pain  -SI       Left Upper Ext    Lt Shoulder Abduction PROM 138  -SI    Lt Shoulder Flexion PROM 153  -SI    Lt Shoulder External Rotation PROM 60  -SI    Lt Shoulder Internal Rotation PROM 70  -SI      User Key  (r) = Recorded By, (t) = Taken By, (c) = Cosigned By    Initials Name Provider Type    LUIS CARLOS Nishi Unger PTA Physical Therapy Assistant                            PT Assessment/Plan     Row Name 08/22/18 1144          PT Assessment    Assessment Comments ROM and strength at just under 8 weeks post-op as expected.  Minimal pain and sleeping well  -SI       User Key  (r) = Recorded By, (t) = Taken By, (c) = Cosigned By    Initials Name Provider Type    Nishi Godoy PTA Physical Therapy Assistant                Modalities     Row Name 08/22/18 1100             Ice    Patient reports will apply ice at home to involved area Yes  -SI        User Key  (r) = Recorded By, (t) = Taken By, (c) = Cosigned By    Initials Name Provider Type    Nishi Godoy PTA Physical Therapy Assistant                Exercises     Row Name 08/22/18 1100             Subjective Comments    Subjective Comments pt states he is using the left arm but being careful with it.  -SI         Subjective Pain    Able to rate subjective pain? yes  -SI      Subjective Pain Comment minimal left shoulder pain  -SI         Total Minutes    86848 - PT Therapeutic Exercise Minutes 30  -SI      62869 - PT Manual Therapy Minutes 15  -SI         Exercise 1    Exercise Name 1 supine assisted overhead flex with cane  -SI      Sets 1 1  -SI      Reps 1 10  -SI      Time 1 10  -SI         Exercise 2    Exercise Name 2 iso flex and ABD and ext at neutral  -SI      Sets 2 1  -SI      Reps 2 10  -SI      Time 2 5s  -SI         Exercise 3    Exercise Name 3 supine ER with cane  -SI      Sets 3 1  -SI      Reps 3 10  -SI      Time 3 5  -SI         Exercise 4    Exercise Name 4 elbow flex/ext 1 lb   -SI      Sets 4 2  -SI      Reps 4 10  -SI         Exercise 5    Exercise Name 5 FA pronation/supination   -SI      Sets 5 2  -SI      Reps 5 10  -SI         Exercise 6    Exercise Name 6 supine Sh press up with 1 lbb  -SI      Sets 6 1  -SI      Reps 6 20  -SI         Exercise 7    Exercise Name 7 supine, sh 90 degrees, trace one set of alphabet, hold 1 lb  -SI      Sets 7 1  -SI        User Key  (r) = Recorded By, (t) = Taken By, (c) = Cosigned By    Initials Name Provider Type    Nishi Godoy PTA Physical Therapy Assistant                        Manual Rx (last 36 hours)      Manual Treatments     Row Name 08/22/18 1100             Total Minutes    94592 - PT Manual Therapy Minutes 15  -SI         Manual Rx 1    Manual Rx 1 Location L shoulder  -SI      Manual Rx 1 Type PROM, oscillations, GH glides, STM   -SI      Manual Rx 1 Duration 15  -SI        User Key  (r) = Recorded By, (t) = Taken By, (c) = Cosigned By    Initials Name Provider Type    Nishi Godoy PTA Physical Therapy Assistant              Therapy Education  Given: HEP, Symptoms/condition management (pt cautioned not to do too much and explained protocol)  Program: Progressed  How Provided: Verbal, Demonstration, Written  Provided to: Patient  Level of Understanding: Teach back education performed              Time Calculation:   Start Time: 1100  Stop Time: 1145  Time Calculation (min): 45 min  Total Timed Code Minutes- PT: 45 minute(s)  Therapy Suggested Charges     Code   Minutes Charges    94739 (CPT®) Hc Pt Neuromusc Re Education Ea 15 Min      97806 (CPT®) Hc Pt Ther Proc Ea 15 Min 30 2    99080 (CPT®) Hc Gait Training Ea 15 Min      79252 (CPT®) Hc Pt Therapeutic Act Ea 15 Min      10853 (CPT®) Hc Pt Manual Therapy Ea 15 Min 15 1    61135 (CPT®) Hc Pt Ther Massage- Per 15 Min      58516 (CPT®) Hc Pt Iontophoresis Ea 15 Min      94207 (CPT®) Hc Pt Elec Stim Ea-Per 15 Min      73125 (CPT®) Hc Pt Ultrasound Ea 15 Min      29469  (CPT®) Hc Pt Self Care/Mgmt/Train Ea 15 Min      85366 (CPT®) Hc Pt Prosthetic (S) Train Initial Encounter, Each 15 Min      48440 (CPT®) Hc Orthotic(S) Mgmt/Train Initial Encounter, Each 15min      51675 (CPT®) Hc Pt Aquatic Therapy Ea 15 Min      78262 (CPT®) Hc Pt Orthotic(S)/Prosthetic(S) Encounter, Each 15 Min      Total  45 3        Therapy Charges for Today     Code Description Service Date Service Provider Modifiers Qty    05942205330 HC PT THER PROC EA 15 MIN 8/22/2018 Nishi Unger, PTA GP 2    32909004299 HC PT MANUAL THERAPY EA 15 MIN 8/22/2018 Nishi Unger, PTA GP 1                    Nishi Unger, DEDE  8/22/2018

## 2018-08-24 ENCOUNTER — HOSPITAL ENCOUNTER (OUTPATIENT)
Dept: PHYSICAL THERAPY | Facility: HOSPITAL | Age: 73
Setting detail: THERAPIES SERIES
Discharge: HOME | End: 2018-08-24
Attending: ORTHOPAEDIC SURGERY

## 2018-08-24 DIAGNOSIS — Z96.612 S/P REVERSE TOTAL SHOULDER ARTHROPLASTY, LEFT: Primary | ICD-10-CM

## 2018-08-24 PROCEDURE — 97140 MANUAL THERAPY 1/> REGIONS: CPT

## 2018-08-24 PROCEDURE — 97110 THERAPEUTIC EXERCISES: CPT

## 2018-08-24 NOTE — THERAPY TREATMENT NOTE
Outpatient Physical Therapy Ortho Treatment Note  [facility]     Patient Name: Jermaine Denis  : 1945  MRN: [id number]  Today's Date: 2018      Visit Date: 2018    Visit Dx:    ICD-10-CM ICD-9-CM   1. S/P reverse total shoulder arthroplasty, left Z96.612 V43.61       Patient Active Problem List   Diagnosis   • Rotator cuff tear arthropathy, right   • Rotator cuff arthropathy   • Left rotator cuff tear arthropathy   • Osteoarthritis of left shoulder        Past Medical History:   Diagnosis Date   • Arthritis    • BPH (benign prostatic hyperplasia)    • Diabetes mellitus (CMS/HCC)     no meds now diet controlled was on insulin and then meds   • Elevated cholesterol    • GERD (gastroesophageal reflux disease)    • High cholesterol    • History of hepatitis C     treated medically   • History of kidney stones    • History of transfusion    • Hypertension    • Psoriasis    • Shoulder pain    • Sleep apnea     bipap   • Urethral stricture     has F/C in place  caths self prn   • UTI (urinary tract infection)     freq uti        Past Surgical History:   Procedure Laterality Date   • ANKLE SURGERY Right     orif with hardware   • CARPAL TUNNEL RELEASE Left    • COLONOSCOPY     • ENDOSCOPY     • GASTRIC BYPASS         • HAND SURGERY Right     thumb    • JOINT REPLACEMENT      lt knee   • TOTAL SHOULDER ARTHROPLASTY W/ DISTAL CLAVICLE EXCISION Right 2018    Procedure: Reverse Total Shoulder Arthroplasty;  Surgeon: Alex Ordaz MD;  Location: [facility];  Service:    • TOTAL SHOULDER ARTHROPLASTY W/ DISTAL CLAVICLE EXCISION Left 2018    Procedure: TOTAL SHOULDER REVERSE ARTHROPLASTY;  Surgeon: Alex Ordaz MD;  Location: [facility];  Service: Orthopedics             PT Ortho     Row Name 18 1100       Subjective Comments    Subjective Comments No complaints  -SI       Subjective Pain    Able to rate subjective pain? yes  -SI       Left Upper Ext    Lt Shoulder  External Rotation PROM 60  -SI       Left Shoulder (Manual Muscle Testing)    MMT: Flexion, Left Shoulder flexion   4-  -SI    MMT, Gross Movement: Left Shoulder Flexion (4-/5) good minus  -SI    MMT: ABduction, Left Shoulder abduction  -SI    MMT, Gross Movement: Left Shoulder ABduction (3+/5) fair plus  -SI    MMT: Internal Rotation, Left Shoulder internal rotation  -SI    MMT, Gross Movement: Left Shoulder Internal Rotation (4-/5) good minus  -SI    MMT: External Rotation, Left Shoulder external rotation  -SI    MMT, Gross Movement: Left Shoulder External Rotation (3+/5) fair plus  -SI    Row Name 08/22/18 1100       Subjective Comments    Subjective Comments pt states he is using the left arm but being careful with it.  -SI       Subjective Pain    Able to rate subjective pain? yes  -SI    Subjective Pain Comment minimal left shoulder pain  -SI       Left Upper Ext    Lt Shoulder Abduction PROM 138  -SI    Lt Shoulder Flexion PROM 153  -SI    Lt Shoulder External Rotation PROM 60  -SI    Lt Shoulder Internal Rotation PROM 70  -SI      User Key  (r) = Recorded By, (t) = Taken By, (c) = Cosigned By    Initials Name Provider Type    Nishi Godoy PTA Physical Therapy Assistant                            PT Assessment/Plan     Row Name 08/24/18 1132          PT Assessment    Assessment Comments HEP upgraded fpor strengthening today and pt doing great.  Using arm too much at home probably from what he reports but no pain.  -SI       User Key  (r) = Recorded By, (t) = Taken By, (c) = Cosigned By    Initials Name Provider Type    Nishi Godoy PTA Physical Therapy Assistant                    Exercises     Row Name 08/24/18 1100             Subjective Comments    Subjective Comments No complaints  -SI         Subjective Pain    Able to rate subjective pain? yes  -SI         Total Minutes    86980 - PT Therapeutic Exercise Minutes 30  -SI      62811 - PT Manual Therapy Minutes 10  -SI         Exercise 1     Exercise Name 1 supine assisted overhead flex with cane  -SI      Sets 1 1  -SI      Reps 1 10  -SI      Time 1 10  -SI         Exercise 2    Exercise Name 2 iso flex and ABD and ext at neutral  -SI      Sets 2 1  -SI      Reps 2 10  -SI      Time 2 5s  -SI         Exercise 3    Exercise Name 3 supine ER with cane  -SI      Sets 3 1  -SI      Reps 3 10  -SI      Time 3 5  -SI         Exercise 4    Exercise Name 4 elbow flex/ext 1 lb  -SI      Sets 4 2  -SI      Reps 4 10  -SI         Exercise 5    Exercise Name 5 standing active sh flex and scaption  -SI      Sets 5 2  -SI      Reps 5 10  -SI         Exercise 6    Exercise Name 6 supine Sh press up with 1 lbb  -SI      Sets 6 1  -SI      Reps 6 20  -SI         Exercise 7    Exercise Name 7 supine, sh 90 degrees, trace one set of alphabet, hold 1 lb  -SI      Sets 7 1  -SI         Exercise 8    Exercise Name 8 scapula retraction with red TB  -SI      Sets 8 2  -SI      Reps 8 10  -SI        User Key  (r) = Recorded By, (t) = Taken By, (c) = Cosigned By    Initials Name Provider Type    Nishi Godoy PTA Physical Therapy Assistant                        Manual Rx (last 36 hours)      Manual Treatments     Row Name 08/24/18 1100             Total Minutes    66983 - PT Manual Therapy Minutes 10  -SI         Manual Rx 1    Manual Rx 1 Location L shoulder  -SI      Manual Rx 1 Type PROM, oscillations, GH glides, STM   -SI      Manual Rx 1 Duration 10  -SI        User Key  (r) = Recorded By, (t) = Taken By, (c) = Cosigned By    Initials Name Provider Type    Nishi Godoy PTA Physical Therapy Assistant                             Time Calculation:   Start Time: 1110  Stop Time: 1150  Time Calculation (min): 40 min  Total Timed Code Minutes- PT: 40 minute(s)  Therapy Suggested Charges     Code   Minutes Charges    57484 (CPT®) Hc Pt Neuromusc Re Education Ea 15 Min      79414 (CPT®) Hc Pt Ther Proc Ea 15 Min 30 2    96123 (CPT®) Hc Gait Training Ea 15 Min       80818 (CPT®) Hc Pt Therapeutic Act Ea 15 Min      44598 (CPT®) Hc Pt Manual Therapy Ea 15 Min 10 1    43961 (CPT®) Hc Pt Ther Massage- Per 15 Min      35076 (CPT®) Hc Pt Iontophoresis Ea 15 Min      20387 (CPT®) Hc Pt Elec Stim Ea-Per 15 Min      02504 (CPT®) Hc Pt Ultrasound Ea 15 Min      66028 (CPT®) Hc Pt Self Care/Mgmt/Train Ea 15 Min      00982 (CPT®) Hc Pt Prosthetic (S) Train Initial Encounter, Each 15 Min      73581 (CPT®) Hc Orthotic(S) Mgmt/Train Initial Encounter, Each 15min      50509 (CPT®) Hc Pt Aquatic Therapy Ea 15 Min      09611 (CPT®) Hc Pt Orthotic(S)/Prosthetic(S) Encounter, Each 15 Min      Total  40 3        Therapy Charges for Today     Code Description Service Date Service Provider Modifiers Qty    19861705057 HC PT THER PROC EA 15 MIN 8/24/2018 Nishi Unger, PTA GP 2    68514308931 HC PT MANUAL THERAPY EA 15 MIN 8/24/2018 Nishi Unger, PTA GP 1                    Nishi Unger, DEDE  8/24/2018

## 2018-08-29 ENCOUNTER — HOSPITAL ENCOUNTER (OUTPATIENT)
Dept: PHYSICAL THERAPY | Facility: HOSPITAL | Age: 73
Setting detail: THERAPIES SERIES
Discharge: HOME | End: 2018-08-29
Attending: ORTHOPAEDIC SURGERY

## 2018-08-29 DIAGNOSIS — Z96.612 S/P REVERSE TOTAL SHOULDER ARTHROPLASTY, LEFT: Primary | ICD-10-CM

## 2018-08-29 PROCEDURE — 97140 MANUAL THERAPY 1/> REGIONS: CPT

## 2018-08-29 PROCEDURE — 97110 THERAPEUTIC EXERCISES: CPT

## 2018-08-29 NOTE — THERAPY TREATMENT NOTE
Outpatient Physical Therapy Ortho Treatment Note  [facility]     Patient Name: Jermaine Denis  : 1945  MRN: [id number]  Today's Date: 2018      Visit Date: 2018    Visit Dx:  No diagnosis found.    Patient Active Problem List   Diagnosis   • Rotator cuff tear arthropathy, right   • Rotator cuff arthropathy   • Left rotator cuff tear arthropathy   • Osteoarthritis of left shoulder        Past Medical History:   Diagnosis Date   • Arthritis    • BPH (benign prostatic hyperplasia)    • Diabetes mellitus (CMS/HCC)     no meds now diet controlled was on insulin and then meds   • Elevated cholesterol    • GERD (gastroesophageal reflux disease)    • High cholesterol    • History of hepatitis C     treated medically   • History of kidney stones    • History of transfusion    • Hypertension    • Psoriasis    • Shoulder pain    • Sleep apnea     bipap   • Urethral stricture     has F/C in place  caths self prn   • UTI (urinary tract infection)     freq uti        Past Surgical History:   Procedure Laterality Date   • ANKLE SURGERY Right     orif with hardware   • CARPAL TUNNEL RELEASE Left    • COLONOSCOPY     • ENDOSCOPY     • GASTRIC BYPASS         • HAND SURGERY Right     thumb    • JOINT REPLACEMENT      lt knee   • TOTAL SHOULDER ARTHROPLASTY W/ DISTAL CLAVICLE EXCISION Right 2018    Procedure: Reverse Total Shoulder Arthroplasty;  Surgeon: Alex Ordaz MD;  Location: [facility];  Service:    • TOTAL SHOULDER ARTHROPLASTY W/ DISTAL CLAVICLE EXCISION Left 2018    Procedure: TOTAL SHOULDER REVERSE ARTHROPLASTY;  Surgeon: Alex Ordaz MD;  Location: [facility];  Service: Orthopedics             PT Ortho     Row Name 18 1100       Subjective Pain    Able to rate subjective pain? yes  -SI    Pre-Treatment Pain Level 0  -SI    Post-Treatment Pain Level 0  -SI    Subjective Pain Comment sore now and then but goes away.  No meds needed  -SI       General ROM    GENERAL  ROM COMMENTS AROM left shoulder standing is flex and abd 140, ER C5, IR side pocket  -SI       Left Shoulder (Manual Muscle Testing)    MMT: Flexion, Left Shoulder flexion  -SI    MMT, Gross Movement: Left Shoulder Flexion (4-/5) good minus  -SI    MMT: ABduction, Left Shoulder abduction  -SI    MMT, Gross Movement: Left Shoulder ABduction (4-/5) good minus  -SI    MMT: Internal Rotation, Left Shoulder internal rotation  -SI    MMT, Gross Movement: Left Shoulder Internal Rotation (4/5) good  -SI    MMT: External Rotation, Left Shoulder external rotation  -SI    MMT, Gross Movement: Left Shoulder External Rotation (4-/5) good minus  -SI      User Key  (r) = Recorded By, (t) = Taken By, (c) = Cosigned By    Initials Name Provider Type    Nishi Godoy PTA Physical Therapy Assistant                            PT Assessment/Plan     Row Name 08/29/18 1118          PT Assessment    Assessment Comments Pt will be 9 weeks post-op tomorrow.  He is functioning at a high level as he describes drivint to [place], cutting grass with riding mower and states to trim bushes today.....Strengthening in progress.  ROM improving.  -SI       User Key  (r) = Recorded By, (t) = Taken By, (c) = Cosigned By    Initials Name Provider Type    Nishi Godoy PTA Physical Therapy Assistant                    Exercises     Row Name 08/29/18 1100             Subjective Pain    Able to rate subjective pain? yes  -SI      Pre-Treatment Pain Level 0  -SI      Post-Treatment Pain Level 0  -SI      Subjective Pain Comment sore now and then but goes away.  No meds needed  -SI         Total Minutes    25097 - PT Therapeutic Exercise Minutes 30  -SI      95929 - PT Manual Therapy Minutes 15  -SI         Exercise 1    Exercise Name 1 supine assisted overhead flex with cane  -SI      Sets 1 1  -SI      Reps 1 10  -SI      Time 1 10  -SI         Exercise 2    Exercise Name 2 iso flex and ABD and ext at neutral  -SI      Sets 2 1  -SI      Reps 2  10  -SI      Time 2 5s  -SI         Exercise 3    Exercise Name 3 supine ER with cane  -SI      Sets 3 1  -SI      Reps 3 10  -SI      Time 3 5  -SI         Exercise 4    Exercise Name 4 elbow flex/ext 1 lb  -SI      Sets 4 2  -SI      Reps 4 10  -SI         Exercise 5    Exercise Name 5 standing 1 lb sh flex and scaption  -SI      Sets 5 2  -SI      Reps 5 10  -SI         Exercise 6    Exercise Name 6 supine Sh press up with 2 lbb  -SI      Sets 6 1  -SI      Reps 6 20  -SI         Exercise 7    Exercise Name 7 supine, sh 90 degrees, trace one set of alphabet, hold 2 lb  -SI      Sets 7 1  -SI         Exercise 8    Exercise Name 8 scapula retraction with green TB  -SI      Sets 8 2  -SI      Reps 8 10  -SI        User Key  (r) = Recorded By, (t) = Taken By, (c) = Cosigned By    Initials Name Provider Type    Nishi Godoy PTA Physical Therapy Assistant                        Manual Rx (last 36 hours)      Manual Treatments     Row Name 08/29/18 1100             Total Minutes    29103 - PT Manual Therapy Minutes 15  -SI         Manual Rx 1    Manual Rx 1 Location L shoulder  -SI      Manual Rx 1 Type PROM, oscillations, GH glides, STM   -SI      Manual Rx 1 Duration 15  -SI        User Key  (r) = Recorded By, (t) = Taken By, (c) = Cosigned By    Initials Name Provider Type    Nishi Godoy PTA Physical Therapy Assistant                             Time Calculation:   Start Time: 1100  Stop Time: 1145  Time Calculation (min): 45 min  Therapy Suggested Charges     Code   Minutes Charges    73023 (CPT®) Hc Pt Neuromusc Re Education Ea 15 Min      33041 (CPT®) Hc Pt Ther Proc Ea 15 Min 30 2    99720 (CPT®) Hc Gait Training Ea 15 Min      10165 (CPT®) Hc Pt Therapeutic Act Ea 15 Min      63048 (CPT®) Hc Pt Manual Therapy Ea 15 Min 15 1    94145 (CPT®) Hc Pt Ther Massage- Per 15 Min      42610 (CPT®) Hc Pt Iontophoresis Ea 15 Min      24584 (CPT®) Hc Pt Elec Stim Ea-Per 15 Min      33668 (CPT®) Hc Pt  Ultrasound Ea 15 Min      75228 (CPT®) Hc Pt Self Care/Mgmt/Train Ea 15 Min      47600 (CPT®) Hc Pt Prosthetic (S) Train Initial Encounter, Each 15 Min      97792 (CPT®) Hc Orthotic(S) Mgmt/Train Initial Encounter, Each 15min      78844 (CPT®) Hc Pt Aquatic Therapy Ea 15 Min      57429 (CPT®) Hc Pt Orthotic(S)/Prosthetic(S) Encounter, Each 15 Min      Total  45 3        Therapy Charges for Today     Code Description Service Date Service Provider Modifiers Qty    26728493133 HC PT THER PROC EA 15 MIN 8/29/2018 Nishi Unger, PTA GP 2    04055487079 HC PT MANUAL THERAPY EA 15 MIN 8/29/2018 Nishi Unger, PTA GP 1                    Nishi Unger, DEDE  8/29/2018

## 2018-09-06 ENCOUNTER — HOSPITAL ENCOUNTER (OUTPATIENT)
Dept: PHYSICAL THERAPY | Facility: HOSPITAL | Age: 73
Setting detail: THERAPIES SERIES
Discharge: HOME | End: 2018-09-06
Attending: ORTHOPAEDIC SURGERY

## 2018-09-06 DIAGNOSIS — Z96.612 S/P REVERSE TOTAL SHOULDER ARTHROPLASTY, LEFT: Primary | ICD-10-CM

## 2018-09-06 PROCEDURE — 97110 THERAPEUTIC EXERCISES: CPT

## 2018-09-06 PROCEDURE — 97140 MANUAL THERAPY 1/> REGIONS: CPT

## 2018-09-06 NOTE — THERAPY TREATMENT NOTE
Outpatient Physical Therapy Ortho Treatment Note  [facility]     Patient Name: Jermaine Denis  : 1945  MRN: [id number]  Today's Date: 2018      Visit Date: 2018    Visit Dx:    ICD-10-CM ICD-9-CM   1. S/P reverse total shoulder arthroplasty, left Z96.612 V43.61       Patient Active Problem List   Diagnosis   • Rotator cuff tear arthropathy, right   • Rotator cuff arthropathy   • Left rotator cuff tear arthropathy   • Osteoarthritis of left shoulder        Past Medical History:   Diagnosis Date   • Arthritis    • BPH (benign prostatic hyperplasia)    • Diabetes mellitus (CMS/HCC)     no meds now diet controlled was on insulin and then meds   • Elevated cholesterol    • GERD (gastroesophageal reflux disease)    • High cholesterol    • History of hepatitis C     treated medically   • History of kidney stones    • History of transfusion    • Hypertension    • Psoriasis    • Shoulder pain    • Sleep apnea     bipap   • Urethral stricture     has F/C in place  caths self prn   • UTI (urinary tract infection)     freq uti        Past Surgical History:   Procedure Laterality Date   • ANKLE SURGERY Right     orif with hardware   • CARPAL TUNNEL RELEASE Left    • COLONOSCOPY     • ENDOSCOPY     • GASTRIC BYPASS         • HAND SURGERY Right     thumb    • JOINT REPLACEMENT      lt knee   • TOTAL SHOULDER ARTHROPLASTY W/ DISTAL CLAVICLE EXCISION Right 2018    Procedure: Reverse Total Shoulder Arthroplasty;  Surgeon: Alex Ordaz MD;  Location: [facility];  Service:    • TOTAL SHOULDER ARTHROPLASTY W/ DISTAL CLAVICLE EXCISION Left 2018    Procedure: TOTAL SHOULDER REVERSE ARTHROPLASTY;  Surgeon: Alex Ordaz MD;  Location: [facility];  Service: Orthopedics             PT Ortho     Row Name 18 1000       Subjective Comments    Subjective Comments His chief complaint is chronic left wrist pain and numbness left ring and little fingers.  -SI       Subjective Pain    Able to  rate subjective pain? yes  -SI    Pre-Treatment Pain Level 0  -SI    Post-Treatment Pain Level 0  -SI    Subjective Pain Comment little sore with ex left shoulder but goes away ittle quick  -SI       Left Upper Ext    Lt Shoulder Abduction AROM 135  -SI    Lt Shoulder Abduction PROM 155  -SI    Lt Shoulder Flexion AROM 140  -SI    Lt Shoulder Flexion PROM 160  -SI    Lt Shoulder External Rotation PROM 70  -SI    Lt Shoulder Internal Rotation PROM 75  -SI      User Key  (r) = Recorded By, (t) = Taken By, (c) = Cosigned By    Initials Name Provider Type    Nishi Godoy PTA Physical Therapy Assistant                            PT Assessment/Plan     Row Name 09/06/18 1017          PT Assessment    Assessment Comments Lot verbal cues to get new ex correct today, D2 flex and Horiz abd.  Chronic left wrist pain and Numbness ring and lettle fingers on left.  No left shoulder pain but needs strengthening.  Continue 2 more weekly sessions  -SI       User Key  (r) = Recorded By, (t) = Taken By, (c) = Cosigned By    Initials Name Provider Type    Nishi Godoy PTA Physical Therapy Assistant                    Exercises     Row Name 09/06/18 1000 09/06/18 0900          Subjective Comments    Subjective Comments His chief complaint is chronic left wrist pain and numbness left ring and little fingers.  -SI  --        Subjective Pain    Able to rate subjective pain? yes  -SI  --     Pre-Treatment Pain Level 0  -SI  --     Post-Treatment Pain Level 0  -SI  --     Subjective Pain Comment little sore with ex left shoulder but goes away ittle quick  -SI  --        Total Minutes    19035 - PT Therapeutic Exercise Minutes 30  -SI  --     34516 - PT Manual Therapy Minutes  -- 15  -SI        Exercise 1    Exercise Name 1 supine assisted overhead flex with cane  -SI  --     Sets 1 1  -SI  --     Reps 1 10  -SI  --     Time 1 10  -SI  --        Exercise 2    Exercise Name 2    -SI  --     Sets 2    -SI  --     Reps 2    -SI  --      Time 2    -SI  --        Exercise 3    Exercise Name 3 supine ER with cane  -SI  --     Sets 3 1  -SI  --     Reps 3 10  -SI  --     Time 3 5  -SI  --        Exercise 4    Exercise Name 4 elbow flex/ext 1 lb  -SI  --     Sets 4 2  -SI  --     Reps 4 10  -SI  --        Exercise 5    Exercise Name 5 standing 1 lb sh flex and scaption  -SI  --     Sets 5 2  -SI  --     Reps 5 10  -SI  --        Exercise 6    Exercise Name 6 supine Sh press up with 2 lbb  -SI  --     Sets 6 1  -SI  --     Reps 6 20  -SI  --        Exercise 7    Exercise Name 7 supine D2 flex and horiz abd with red TB  -SI  --     Cueing 7 Verbal;Demo  -SI  --     Sets 7 1  -SI  --     Reps 7 10  -SI  --        Exercise 8    Exercise Name 8 scapula retraction with green TB and Sh ext  -SI  --     Sets 8 2  -SI  --     Reps 8 10  -SI  --       User Key  (r) = Recorded By, (t) = Taken By, (c) = Cosigned By    Initials Name Provider Type    Nishi Godoy PTA Physical Therapy Assistant                        Manual Rx (last 36 hours)      Manual Treatments     Row Name 09/06/18 0900             Total Minutes    57880 - PT Manual Therapy Minutes 15  -SI         Manual Rx 1    Manual Rx 1 Location L shoulder  -SI      Manual Rx 1 Type PROM, oscillations, GH glides, STM   -SI      Manual Rx 1 Duration 15  -SI        User Key  (r) = Recorded By, (t) = Taken By, (c) = Cosigned By    Initials Name Provider Type    Nishi Godoy PTA Physical Therapy Assistant              Therapy Education  Given: HEP, Symptoms/condition management              Time Calculation:   Start Time: 0935  Stop Time: 1035  Time Calculation (min): 60 min  Total Timed Code Minutes- PT: 45 minute(s)  Therapy Suggested Charges     Code   Minutes Charges    69631 (CPT®) Hc Pt Neuromusc Re Education Ea 15 Min      26949 (CPT®) Hc Pt Ther Proc Ea 15 Min 30 2    95606 (CPT®) Hc Gait Training Ea 15 Min      86110 (CPT®) Hc Pt Therapeutic Act Ea 15 Min      90818 (CPT®) Hc Pt Manual  Therapy Ea 15 Min 15 1    13372 (CPT®) Hc Pt Ther Massage- Per 15 Min      41339 (CPT®) Hc Pt Iontophoresis Ea 15 Min      78356 (CPT®) Hc Pt Elec Stim Ea-Per 15 Min      50381 (CPT®) Hc Pt Ultrasound Ea 15 Min      45841 (CPT®) Hc Pt Self Care/Mgmt/Train Ea 15 Min      16225 (CPT®) Hc Pt Prosthetic (S) Train Initial Encounter, Each 15 Min      02091 (CPT®) Hc Orthotic(S) Mgmt/Train Initial Encounter, Each 15min      64365 (CPT®) Hc Pt Aquatic Therapy Ea 15 Min      68541 (CPT®) Hc Pt Orthotic(S)/Prosthetic(S) Encounter, Each 15 Min      Total  45 3        Therapy Charges for Today     Code Description Service Date Service Provider Modifiers Qty    16595369812 HC PT THER PROC EA 15 MIN 9/6/2018 Nishi Unger, PTA GP 2    59143325577 HC PT MANUAL THERAPY EA 15 MIN 9/6/2018 Nishi Unger PTA GP 1                    Nishi Unger, DEDE  9/6/2018

## 2018-09-13 ENCOUNTER — HOSPITAL ENCOUNTER (OUTPATIENT)
Dept: PHYSICAL THERAPY | Facility: HOSPITAL | Age: 73
Discharge: HOME | End: 2018-09-13
Admitting: ORTHOPAEDIC SURGERY

## 2018-09-13 DIAGNOSIS — Z96.612 S/P REVERSE TOTAL SHOULDER ARTHROPLASTY, LEFT: Primary | ICD-10-CM

## 2018-09-13 PROCEDURE — 97140 MANUAL THERAPY 1/> REGIONS: CPT

## 2018-09-13 PROCEDURE — 97110 THERAPEUTIC EXERCISES: CPT

## 2018-09-13 NOTE — THERAPY PROGRESS REPORT/RE-CERT
Outpatient Physical Therapy Ortho Re-Assessment  [facility]     Patient Name: Jermaine Denis  : 1945  MRN: [id number]  Today's Date: 2018      Visit Date: 2018    Patient Active Problem List   Diagnosis   • Rotator cuff tear arthropathy, right   • Rotator cuff arthropathy   • Left rotator cuff tear arthropathy   • Osteoarthritis of left shoulder        Past Medical History:   Diagnosis Date   • Arthritis    • BPH (benign prostatic hyperplasia)    • Diabetes mellitus (CMS/HCC)     no meds now diet controlled was on insulin and then meds   • Elevated cholesterol    • GERD (gastroesophageal reflux disease)    • High cholesterol    • History of hepatitis C     treated medically   • History of kidney stones    • History of transfusion    • Hypertension    • Psoriasis    • Shoulder pain    • Sleep apnea     bipap   • Urethral stricture     has F/C in place  caths self prn   • UTI (urinary tract infection)     freq uti        Past Surgical History:   Procedure Laterality Date   • ANKLE SURGERY Right     orif with hardware   • CARPAL TUNNEL RELEASE Left    • COLONOSCOPY     • ENDOSCOPY     • GASTRIC BYPASS         • HAND SURGERY Right     thumb    • JOINT REPLACEMENT      lt knee   • TOTAL SHOULDER ARTHROPLASTY W/ DISTAL CLAVICLE EXCISION Right 2018    Procedure: Reverse Total Shoulder Arthroplasty;  Surgeon: Alex Ordaz MD;  Location: [facility];  Service:    • TOTAL SHOULDER ARTHROPLASTY W/ DISTAL CLAVICLE EXCISION Left 2018    Procedure: TOTAL SHOULDER REVERSE ARTHROPLASTY;  Surgeon: Alex Ordaz MD;  Location: [facility];  Service: Orthopedics       Visit Dx:     ICD-10-CM ICD-9-CM   1. S/P reverse total shoulder arthroplasty, left Z96.612 V43.61                 PT Ortho     Row Name 18 1000       Subjective Comments    Subjective Comments Min c/o shoulder pain wioth ADL's  -SI       Subjective Pain    Able to rate subjective pain? yes  -SI    Pre-Treatment  Pain Level 0  -SI    Post-Treatment Pain Level 0  -SI    Subjective Pain Comment 1-2 at worst  -SI       Left Upper Ext    Lt Shoulder Abduction AROM 140  -SI    Lt Shoulder Abduction PROM 160  -SI    Lt Shoulder Flexion AROM 140  -SI    Lt Shoulder Flexion PROM 160  -SI    Lt Shoulder External Rotation AROM T1  -SI    Lt Shoulder External Rotation PROM 70  -SI    Lt Shoulder Internal Rotation AROM ipsilateral hip  -SI    Lt Shoulder Internal Rotation PROM 75  -SI       Left Shoulder (Manual Muscle Testing)    MMT: Flexion, Left Shoulder flexion  -SI    MMT, Gross Movement: Left Shoulder Flexion (4/5) good  -SI    MMT: ABduction, Left Shoulder abduction  -SI    MMT, Gross Movement: Left Shoulder ABduction (4-/5) good minus  -SI    MMT: Internal Rotation, Left Shoulder internal rotation  -SI    MMT, Gross Movement: Left Shoulder Internal Rotation (4+/5) good plus  -SI    MMT: External Rotation, Left Shoulder external rotation  -SI    MMT, Gross Movement: Left Shoulder External Rotation (4-/5) good minus  -SI      User Key  (r) = Recorded By, (t) = Taken By, (c) = Cosigned By    Initials Name Provider Type    Nishi Godoy, PTA Physical Therapy Assistant                      Therapy Education  Given: HEP, Symptoms/condition management, Fall prevention and home safety           PT OP Goals     Row Name 09/13/18 1000          PT Short Term Goals    STG 1 Pt will be independent with HEP to improve L shoulder AROM and strength to allow full return to ADL's with no limitations.  -SI     STG 1 Progress Met  -SI     STG 2 Pt will have L shoulder ROM flex 145, , IR 90, ER 90  -SI     STG 2 Progress Progressing   doubt will get 90 degrees IR and ER  -SI     STG 3 Pt will have L shoulder MMT grossly 3/5.  -SI     STG 3 Progress Progressing  -SI        Long Term Goals    LTG 1 Pt will have 15% improvement on DASH.  -SI     LTG 1 Progress Met   13%  -SI     LTG 2 Pt will have L shoulder AROM flex 145, , 90 IR,  90 ER.  -SI     LTG 2 Progress Progressing  -SI     LTG 3 Pt will have L shoulder MMT grossly 4/5.  -SI     LTG 3 Progress Progressing  -SI       User Key  (r) = Recorded By, (t) = Taken By, (c) = Cosigned By    Initials Name Provider Type    SI Nishi Unger, PTA Physical Therapy Assistant                PT Assessment/Plan     Row Name 09/13/18 1356          PT Assessment    Functional Limitations Performance in self-care ADL;Performance in leisure activities;Limitations in functional capacity and performance  -     Impairments Joint mobility;Pain;Muscle strength;Range of motion;Motor function;Endurance  -     Assessment Comments Pt has AROM flex and ABD WFL. He continues to have tightness with IR and ER and has PROM IR 70 and ER 70. Shoulder MMT is grossly 4-/5 at this time. Strengthening shoulder girdle and scapular stabilizers continue to remain important. Pt continues to demonstrate good quality movement with PROM and AROM with open end feel.  -        PT Plan    PT Plan Comments Pt requires continued skilled therapy intervention to include therex, manual, strengthening, stretching, and ROM.  -       User Key  (r) = Recorded By, (t) = Taken By, (c) = Cosigned By    Initials Name Provider Type    LC [place] Stevenson, PT DPT Physical Therapist                  Exercises     Row Name 09/13/18 1000 09/13/18 0900          Subjective Comments    Subjective Comments Min c/o shoulder pain wioth ADL's  -SI  --        Subjective Pain    Able to rate subjective pain? yes  -SI  --     Pre-Treatment Pain Level 0  -SI  --     Post-Treatment Pain Level 0  -SI  --     Subjective Pain Comment 1-2 at worst  -SI  --        Total Minutes    32561 - PT Therapeutic Exercise Minutes 30  -SI  --     06722 - PT Manual Therapy Minutes  -- 15  -SI        Exercise 1    Exercise Name 1 supine assisted overhead flex with cane  -SI  --     Sets 1 1  -SI  --     Reps 1 10  -SI  --     Time 1 10  -SI  --        Exercise 2    Exercise  Name 2    -SI  --     Sets 2    -SI  --     Reps 2    -SI  --     Time 2    -SI  --        Exercise 3    Exercise Name 3 supine ER with cane  -SI  --     Sets 3 1  -SI  --     Reps 3 10  -SI  --     Time 3 5  -SI  --        Exercise 4    Exercise Name 4 elbow flex/ext 1 lb  -SI  --     Sets 4 2  -SI  --     Reps 4 10  -SI  --        Exercise 5    Exercise Name 5 standing 1 lb sh flex  -SI  --     Sets 5 2  -SI  --     Reps 5 10  -SI  --        Exercise 6    Exercise Name 6 supine Sh press up with 2 lb  -SI  --     Sets 6 1  -SI  --     Reps 6 20  -SI  --        Exercise 7    Exercise Name 7 standing D2 flex and horiz abd with red TB  -SI  --     Cueing 7 Verbal;Demo  -SI  --     Sets 7 2  -SI  --     Reps 7 10  -SI  --        Exercise 8    Exercise Name 8 scapula retraction with green TB and Sh ext  -SI  --     Sets 8 2  -SI  --     Reps 8 10  -SI  --       User Key  (r) = Recorded By, (t) = Taken By, (c) = Cosigned By    Initials Name Provider Type    Nishi Godoy PTA Physical Therapy Assistant           Manual Rx (last 36 hours)      Manual Treatments     Row Name 09/13/18 0900             Total Minutes    78663 - PT Manual Therapy Minutes 15  -SI         Manual Rx 1    Manual Rx 1 Location L shoulder  -SI      Manual Rx 1 Type PROM, oscillations, GH glides, STM   -SI      Manual Rx 1 Duration 15  -SI        User Key  (r) = Recorded By, (t) = Taken By, (c) = Cosigned By    Initials Name Provider Type    Nishi Godoy PTA Physical Therapy Assistant                      Outcome Measure Options: Disabilities of the Arm, Shoulder, and Hand (DASH) (13%)         Time Calculation:     Therapy Suggested Charges     Code   Minutes Charges    88410 (CPT®) Hc Pt Neuromusc Re Education Ea 15 Min      66641 (CPT®) Hc Pt Ther Proc Ea 15 Min 30 2    72761 (CPT®) Hc Gait Training Ea 15 Min      72164 (CPT®) Hc Pt Therapeutic Act Ea 15 Min      61056 (CPT®) Hc Pt Manual Therapy Ea 15 Min 15 1    46120 (CPT®) Hc Pt  Ther Massage- Per 15 Min      91257 (CPT®) Hc Pt Iontophoresis Ea 15 Min      74283 (CPT®) Hc Pt Elec Stim Ea-Per 15 Min      49755 (CPT®) Hc Pt Ultrasound Ea 15 Min      52588 (CPT®) Hc Pt Self Care/Mgmt/Train Ea 15 Min      34993 (CPT®) Hc Pt Prosthetic (S) Train Initial Encounter, Each 15 Min      19900 (CPT®) Hc Orthotic(S) Mgmt/Train Initial Encounter, Each 15min      01553 (CPT®) Hc Pt Aquatic Therapy Ea 15 Min      99467 (CPT®) Hc Pt Orthotic(S)/Prosthetic(S) Encounter, Each 15 Min      Total  45 3          Start Time: 1015  Stop Time: 1100  Time Calculation (min): 45 min  Total Timed Code Minutes- PT: 45 minute(s)         PT G-Codes  Outcome Measure Options: Disabilities of the Arm, Shoulder, and Hand (DASH) (13%)         Chico Stevenson, PT DPT  9/13/2018

## 2018-09-20 ENCOUNTER — HOSPITAL ENCOUNTER (OUTPATIENT)
Dept: PHYSICAL THERAPY | Facility: HOSPITAL | Age: 73
Setting detail: THERAPIES SERIES
Discharge: HOME | End: 2018-09-20
Attending: ORTHOPAEDIC SURGERY

## 2018-09-20 DIAGNOSIS — Z96.612 S/P REVERSE TOTAL SHOULDER ARTHROPLASTY, LEFT: Primary | ICD-10-CM

## 2018-09-20 PROCEDURE — 97140 MANUAL THERAPY 1/> REGIONS: CPT

## 2018-09-20 PROCEDURE — 97110 THERAPEUTIC EXERCISES: CPT

## 2018-09-20 NOTE — THERAPY DISCHARGE NOTE
Outpatient Physical Therapy Ortho Treatment Note/Discharge Summary  [facility]     Patient Name: Jermaine Denis  : 1945  MRN: [id number]  Today's Date: 2018      Visit Date: 2018    Visit Dx:    ICD-10-CM ICD-9-CM   1. S/P reverse total shoulder arthroplasty, left Z96.612 V43.61       Patient Active Problem List   Diagnosis   • Rotator cuff tear arthropathy, right   • Rotator cuff arthropathy   • Left rotator cuff tear arthropathy   • Osteoarthritis of left shoulder        Past Medical History:   Diagnosis Date   • Arthritis    • BPH (benign prostatic hyperplasia)    • Diabetes mellitus (CMS/HCC)     no meds now diet controlled was on insulin and then meds   • Elevated cholesterol    • GERD (gastroesophageal reflux disease)    • High cholesterol    • History of hepatitis C     treated medically   • History of kidney stones    • History of transfusion    • Hypertension    • Psoriasis    • Shoulder pain    • Sleep apnea     bipap   • Urethral stricture     has F/C in place  caths self prn   • UTI (urinary tract infection)     freq uti        Past Surgical History:   Procedure Laterality Date   • ANKLE SURGERY Right     orif with hardware   • CARPAL TUNNEL RELEASE Left    • COLONOSCOPY     • ENDOSCOPY     • GASTRIC BYPASS         • HAND SURGERY Right     thumb    • JOINT REPLACEMENT      lt knee   • TOTAL SHOULDER ARTHROPLASTY W/ DISTAL CLAVICLE EXCISION Right 2018    Procedure: Reverse Total Shoulder Arthroplasty;  Surgeon: Alex Ordaz MD;  Location: [facility];  Service:    • TOTAL SHOULDER ARTHROPLASTY W/ DISTAL CLAVICLE EXCISION Left 2018    Procedure: TOTAL SHOULDER REVERSE ARTHROPLASTY;  Surgeon: Alex Ordaz MD;  Location: [facility];  Service: Orthopedics             PT Ortho     Row Name 18 0900       Subjective Comments    Subjective Comments pt reports left shoulder now feels better than his right one.  States he is fully functioning with his  level of ADL's  -SI       Subjective Pain    Able to rate subjective pain? yes  -SI    Pre-Treatment Pain Level 0  -SI    Post-Treatment Pain Level 0  -SI    Subjective Pain Comment Pain not even a 1 now ....  -SI       Left Upper Ext    Lt Shoulder Abduction AROM 145  -SI    Lt Shoulder Abduction PROM 160  -SI    Lt Shoulder Flexion AROM 145  -SI    Lt Shoulder Flexion PROM 160  -SI    Lt Shoulder External Rotation AROM T1  -SI    Lt Shoulder External Rotation PROM 70  -SI    Lt Shoulder Internal Rotation AROM ipsilateral hip  -SI    Lt Shoulder Internal Rotation PROM 75  -SI       Left Shoulder (Manual Muscle Testing)    MMT: Flexion, Left Shoulder flexion  -SI    MMT, Gross Movement: Left Shoulder Flexion (4/5) good  -SI    MMT: ABduction, Left Shoulder abduction  -SI    MMT, Gross Movement: Left Shoulder ABduction (4-/5) good minus  -SI    MMT: Internal Rotation, Left Shoulder internal rotation  -SI    MMT, Gross Movement: Left Shoulder Internal Rotation (4+/5) good plus  -SI    MMT: External Rotation, Left Shoulder external rotation  -SI    MMT, Gross Movement: Left Shoulder External Rotation (4-/5) good minus  -SI      User Key  (r) = Recorded By, (t) = Taken By, (c) = Cosigned By    Initials Name Provider Type    Nishi Godoy PTA Physical Therapy Assistant                            PT Assessment/Plan     Row Name 09/20/18 1008          PT Assessment    Assessment Comments Pt ind with HEP and feels ready to DC PT  -SI       User Key  (r) = Recorded By, (t) = Taken By, (c) = Cosigned By    Initials Name Provider Type    Nishi Godoy PTA Physical Therapy Assistant                    Exercises     Row Name 09/20/18 0900             Subjective Comments    Subjective Comments pt reports left shoulder now feels better than his right one.  States he is fully functioning with his level of ADL's  -SI         Subjective Pain    Able to rate subjective pain? yes  -SI      Pre-Treatment Pain Level 0  -SI       Post-Treatment Pain Level 0  -SI      Subjective Pain Comment Pain not even a 1 now ....  -SI         Total Minutes    58916 - PT Therapeutic Exercise Minutes 30  -SI      55500 - PT Manual Therapy Minutes 15  -SI         Exercise 1    Exercise Name 1 supine assisted overhead flex with cane  -SI      Sets 1 1  -SI      Reps 1 10  -SI      Time 1 10  -SI         Exercise 2    Exercise Name 2    -SI      Sets 2    -SI      Reps 2    -SI      Time 2    -SI         Exercise 3    Exercise Name 3 supine ER with cane  -SI      Sets 3 1  -SI      Reps 3 10  -SI      Time 3 5  -SI         Exercise 4    Exercise Name 4 elbow flex/ext 1 lb  -SI      Sets 4 2  -SI      Reps 4 10  -SI         Exercise 5    Exercise Name 5 standing 1 lb sh flex  -SI      Sets 5 2  -SI      Reps 5 10  -SI         Exercise 6    Exercise Name 6 supine Sh press up with 2 lb  -SI      Sets 6 1  -SI      Reps 6 20  -SI         Exercise 7    Exercise Name 7 standing D2 flex and horiz abd with red TB  -SI      Cueing 7 Verbal;Demo  -SI      Sets 7 2  -SI      Reps 7 10  -SI         Exercise 8    Exercise Name 8 scapula retraction with green TB and Sh ext  -SI      Sets 8 2  -SI      Reps 8 10  -SI        User Key  (r) = Recorded By, (t) = Taken By, (c) = Cosigned By    Initials Name Provider Type    Nishi Godoy PTA Physical Therapy Assistant                        Manual Rx (last 36 hours)      Manual Treatments     Row Name 09/20/18 0900             Total Minutes    36407 - PT Manual Therapy Minutes 15  -SI         Manual Rx 1    Manual Rx 1 Location L shoulder  -SI      Manual Rx 1 Type PROM, oscillations, GH glides, STM   -SI      Manual Rx 1 Duration 15  -SI        User Key  (r) = Recorded By, (t) = Taken By, (c) = Cosigned By    Initials Name Provider Type    Nishi Godoy PTA Physical Therapy Assistant                PT OP Goals     Row Name 09/20/18 1000          PT Short Term Goals    STG 1 Pt will be independent with HEP to  improve L shoulder AROM and strength to allow full return to ADL's with no limitations.  -SI     STG 1 Progress Met  -SI     STG 2 Pt will have L shoulder ROM flex 145, , IR 90, ER 90  -SI     STG 2 Progress Met   doubt will get 90 degrees IR and ER  -SI     STG 3 Pt will have L shoulder MMT grossly 3/5.  -SI     STG 3 Progress Met  -SI        Long Term Goals    LTG 1 Pt will have 15% improvement on DASH.  -SI     LTG 1 Progress Met   13%  -SI     LTG 2 Pt will have L shoulder AROM flex 145, , 90 IR, 90 ER.  -SI     LTG 2 Progress Partially Met  -SI     LTG 3 Pt will have L shoulder MMT grossly 4/5.  -SI     LTG 3 Progress Met  -SI       User Key  (r) = Recorded By, (t) = Taken By, (c) = Cosigned By    Initials Name Provider Type    Nishi Godoy, DEDE Physical Therapy Assistant          Therapy Education  Given: HEP, Symptoms/condition management  Program: Reinforced  How Provided: Verbal, Demonstration, Written  Provided to: Patient  Level of Understanding: Teach back education performed              Time Calculation:   Start Time: 0930  Stop Time: 1015  Time Calculation (min): 45 min  Total Timed Code Minutes- PT: 5 minute(s)  Therapy Suggested Charges     Code   Minutes Charges    83908 (CPT®) Hc Pt Neuromusc Re Education Ea 15 Min      73448 (CPT®) Hc Pt Ther Proc Ea 15 Min 30 2    36584 (CPT®) Hc Gait Training Ea 15 Min      51803 (CPT®) Hc Pt Therapeutic Act Ea 15 Min      54135 (CPT®) Hc Pt Manual Therapy Ea 15 Min 15 1    03712 (CPT®) Hc Pt Ther Massage- Per 15 Min      88344 (CPT®) Hc Pt Iontophoresis Ea 15 Min      42645 (CPT®) Hc Pt Elec Stim Ea-Per 15 Min      53440 (CPT®) Hc Pt Ultrasound Ea 15 Min      71917 (CPT®) Hc Pt Self Care/Mgmt/Train Ea 15 Min      06482 (CPT®) Hc Pt Prosthetic (S) Train Initial Encounter, Each 15 Min      99293 (CPT®) Hc Orthotic(S) Mgmt/Train Initial Encounter, Each 15min      63404 (CPT®) Hc Pt Aquatic Therapy Ea 15 Min      30782 (CPT®) Hc Pt  Orthotic(S)/Prosthetic(S) Encounter, Each 15 Min      Total  45 3        Therapy Charges for Today     Code Description Service Date Service Provider Modifiers Qty    67900801456 HC PT THER PROC EA 15 MIN 9/20/2018 Nishi Unger PTA GP 2    78507220620 HC PT MANUAL THERAPY EA 15 MIN 9/20/2018 Nishi Unger PTA GP 1                OP PT Discharge Summary  Reason for Discharge: Independent  Outcomes Achieved: Patient able to partially acheive established goals  Discharge Destination: Home with home program  Discharge Instructions/Additional Comments: HEP every other day for 6 more weeks      Nishi Unger PTA  9/20/2018

## 2019-02-15 ENCOUNTER — OFFICE VISIT (OUTPATIENT)
Dept: ORTHOPEDIC SURGERY | Facility: CLINIC | Age: 74
End: 2019-02-15

## 2019-02-15 VITALS — TEMPERATURE: 98.1 F | BODY MASS INDEX: 37.56 KG/M2 | WEIGHT: 220 LBS | HEIGHT: 64 IN

## 2019-02-15 DIAGNOSIS — Z96.612 S/P REVERSE TOTAL SHOULDER ARTHROPLASTY, LEFT: Primary | ICD-10-CM

## 2019-02-15 PROCEDURE — 99212 OFFICE O/P EST SF 10 MIN: CPT | Performed by: ORTHOPAEDIC SURGERY

## 2019-02-15 PROCEDURE — 73030 X-RAY EXAM OF SHOULDER: CPT | Performed by: ORTHOPAEDIC SURGERY

## 2019-02-23 NOTE — PROGRESS NOTES
Chief Complaint:  Follow-up status post bilateral shoulder arthroplasties    HPI:  Mr. Lincoln is now about 8 months out from his left reverse total shoulder and a year out from his right.  Both are doing great.  Denies any problems.    Exam:  Incisions are healed.  Motion is excellent except for internal rotation.  Good strength with abduction and elevation    Imaging:  AP, scapular Y and actually views left shoulder are ordered, reviewed, and compared to previous x-rays.  The implants appear well fixed and well positioned.  No compensating process noted.    Assessment:  Follow-up status post bilateral shoulder arthroplasties    Plan:  We discussed appropriate activity modifications and restrictions.  We discussed antibiotic prophylaxis.  He will follow-up annually.    Alex Ordaz MD  02/15/2019

## 2020-02-14 ENCOUNTER — OFFICE VISIT (OUTPATIENT)
Dept: ORTHOPEDIC SURGERY | Facility: CLINIC | Age: 75
End: 2020-02-14

## 2020-02-14 VITALS — BODY MASS INDEX: 39.78 KG/M2 | WEIGHT: 233 LBS | TEMPERATURE: 98 F | HEIGHT: 64 IN

## 2020-02-14 DIAGNOSIS — Z96.612 S/P SHOULDER REPLACEMENT, LEFT: Primary | ICD-10-CM

## 2020-02-14 PROCEDURE — 99212 OFFICE O/P EST SF 10 MIN: CPT | Performed by: ORTHOPAEDIC SURGERY

## 2020-02-14 PROCEDURE — 73030 X-RAY EXAM OF SHOULDER: CPT | Performed by: ORTHOPAEDIC SURGERY

## 2020-02-14 RX ORDER — FINASTERIDE 1 MG/1
5 TABLET, FILM COATED ORAL DAILY
COMMUNITY
End: 2023-02-24

## 2020-02-14 NOTE — PROGRESS NOTES
"Chief complaint: Follow-up status post bilateral reverse total shoulder arthroplasties    Mr. Denis follows up today for both shoulders.  He says they are doing fantastic.  He says \"I have not had a bit of pain since she replaced them\".  He reports good motion and function.  No complaints.    Incisions are healed.  Motion is excellent.  Good strength with abduction and elevation.  No pain on exam.    AP, scapular Y and axillary views of the left shoulder are ordered and reviewed.  These are compared to previous x-rays.  The components appear well fixed and well-positioned.  No complicating process noted.    Assessment: Annual follow-up status post bilateral reverse total shoulders.    Plan: We again discussed antibiotic prophylaxis recommendations.  At this point, I feel comfortable with him following up on an as-needed basis.  "

## 2022-03-07 ENCOUNTER — PREP FOR SURGERY (OUTPATIENT)
Dept: OTHER | Facility: HOSPITAL | Age: 77
End: 2022-03-07

## 2022-03-07 ENCOUNTER — CLINICAL SUPPORT (OUTPATIENT)
Dept: GASTROENTEROLOGY | Facility: CLINIC | Age: 77
End: 2022-03-07

## 2022-03-07 DIAGNOSIS — Z86.010 HISTORY OF COLON POLYPS: ICD-10-CM

## 2022-03-07 DIAGNOSIS — Z12.11 ENCOUNTER FOR SCREENING COLONOSCOPY: Primary | ICD-10-CM

## 2022-03-07 PROBLEM — Z86.0100 HISTORY OF COLON POLYPS: Status: ACTIVE | Noted: 2022-03-07

## 2022-03-07 PROBLEM — C80.1 CANCER: Status: ACTIVE | Noted: 2022-03-07

## 2022-05-12 ENCOUNTER — ANESTHESIA EVENT (OUTPATIENT)
Dept: GASTROENTEROLOGY | Facility: HOSPITAL | Age: 77
End: 2022-05-12

## 2022-05-12 ENCOUNTER — HOSPITAL ENCOUNTER (OUTPATIENT)
Facility: HOSPITAL | Age: 77
Setting detail: HOSPITAL OUTPATIENT SURGERY
Discharge: HOME | End: 2022-05-12
Attending: INTERNAL MEDICINE | Admitting: INTERNAL MEDICINE

## 2022-05-12 ENCOUNTER — ANESTHESIA (OUTPATIENT)
Dept: GASTROENTEROLOGY | Facility: HOSPITAL | Age: 77
End: 2022-05-12

## 2022-05-12 VITALS
RESPIRATION RATE: 18 BRPM | SYSTOLIC BLOOD PRESSURE: 99 MMHG | BODY MASS INDEX: 38.54 KG/M2 | DIASTOLIC BLOOD PRESSURE: 68 MMHG | HEART RATE: 56 BPM | HEIGHT: 64 IN | TEMPERATURE: 97.5 F | WEIGHT: 225.75 LBS | OXYGEN SATURATION: 94 %

## 2022-05-12 LAB — GLUCOSE BLDC GLUCOMTR-MCNC: 110 MG/DL (ref 70–99)

## 2022-05-12 PROCEDURE — 45378 DIAGNOSTIC COLONOSCOPY: CPT | Performed by: INTERNAL MEDICINE

## 2022-05-12 PROCEDURE — 82962 GLUCOSE BLOOD TEST: CPT

## 2022-05-12 PROCEDURE — 25010000002 PROPOFOL 10 MG/ML EMULSION: Performed by: NURSE ANESTHETIST, CERTIFIED REGISTERED

## 2022-05-12 RX ORDER — SODIUM CHLORIDE, SODIUM LACTATE, POTASSIUM CHLORIDE, CALCIUM CHLORIDE 600; 310; 30; 20 MG/100ML; MG/100ML; MG/100ML; MG/100ML
30 INJECTION, SOLUTION INTRAVENOUS CONTINUOUS
Status: DISCONTINUED | OUTPATIENT
Start: 2022-05-12 | End: 2022-05-12 | Stop reason: HOSPADM

## 2022-05-12 RX ORDER — LIDOCAINE HYDROCHLORIDE 20 MG/ML
INJECTION, SOLUTION EPIDURAL; INFILTRATION; INTRACAUDAL; PERINEURAL AS NEEDED
Status: DISCONTINUED | OUTPATIENT
Start: 2022-05-12 | End: 2022-05-12 | Stop reason: SURG

## 2022-05-12 RX ADMIN — SODIUM CHLORIDE, POTASSIUM CHLORIDE, SODIUM LACTATE AND CALCIUM CHLORIDE 30 ML/HR: 600; 310; 30; 20 INJECTION, SOLUTION INTRAVENOUS at 07:33

## 2022-05-12 RX ADMIN — PROPOFOL 200 MCG/KG/MIN: 10 INJECTION, EMULSION INTRAVENOUS at 08:38

## 2022-05-12 RX ADMIN — LIDOCAINE HYDROCHLORIDE 40 MG: 20 INJECTION, SOLUTION EPIDURAL; INFILTRATION; INTRACAUDAL; PERINEURAL at 08:38

## 2022-05-12 NOTE — ANESTHESIA PREPROCEDURE EVALUATION
Anesthesia Evaluation     Patient summary reviewed and Nursing notes reviewed                Airway   Mallampati: I  TM distance: >3 FB  Neck ROM: full  No difficulty expected  Dental    (+) upper dentures    Pulmonary - normal exam    breath sounds clear to auscultation  (+) sleep apnea,   Cardiovascular - normal exam    Rhythm: regular  Rate: normal    (+) hypertension, hyperlipidemia,       Neuro/Psych- negative ROS  GI/Hepatic/Renal/Endo    (+) obesity,  GERD,  hepatitis, liver disease, diabetes mellitus,     Musculoskeletal     Abdominal   (+) obese,    Substance History - negative use     OB/GYN negative ob/gyn ROS         Other   arthritis,    history of cancer                    Anesthesia Plan    ASA 3     general     intravenous induction     Anesthetic plan, all risks, benefits, and alternatives have been provided, discussed and informed consent has been obtained with: patient.        CODE STATUS:

## 2022-05-12 NOTE — ANESTHESIA POSTPROCEDURE EVALUATION
Patient: Jermaine Denis    Procedure Summary     Date: 05/12/22 Room / Location: [facility] ENDOSCOPY 2 / [facility] ENDOSCOPY    Anesthesia Start: 0835 Anesthesia Stop: 0907    Procedure: COLONOSCOPY FOR SCREENING (N/A ) Diagnosis:       Encounter for screening colonoscopy      History of colon polyps      (Encounter for screening colonoscopy [Z12.11])      (History of colon polyps [Z86.010])    Surgeons: Mik Joaquin MD Provider: Dejuan Gutierrez MD    Anesthesia Type: general ASA Status: 3          Anesthesia Type: general    Vitals  Vitals Value Taken Time   BP 99/68 05/12/22 0926   Temp 36.4 °C (97.5 °F) 05/12/22 0925   Pulse 57 05/12/22 0926   Resp 18 05/12/22 0925   SpO2 96 % 05/12/22 0926   Vitals shown include unvalidated device data.        Post Anesthesia Care and Evaluation    Patient location during evaluation: bedside  Patient participation: complete - patient participated  Level of consciousness: awake  Airway patency: patent  PONV Status: none  Cardiovascular status: acceptable  Respiratory status: acceptable  Hydration status: acceptable

## 2022-05-12 NOTE — H&P
ScreeningPre Procedure History & Physical    Chief Complaint:   Screening     Subjective     HPI:   Screening     Past Medical History:   Past Medical History:   Diagnosis Date   • Anemia    • Arthritis    • BPH (benign prostatic hyperplasia)    • Cancer (HCC)    • Diabetes mellitus (HCC)     no meds now diet controlled was on insulin and then meds   • Elevated cholesterol    • GERD (gastroesophageal reflux disease)    • High cholesterol    • History of hepatitis C     treated medically   • History of kidney stones    • History of transfusion    • Hypertension    • Psoriasis    • Shoulder pain    • Sleep apnea     bipap   • Urethral stricture     has F/C in place  caths self prn   • UTI (urinary tract infection)     freq uti       Past Surgical History:  Past Surgical History:   Procedure Laterality Date   • ANKLE SURGERY Right     orif with hardware   • CARPAL TUNNEL RELEASE Left    • COLONOSCOPY     • ENDOSCOPY     • GASTRIC BYPASS      2008   • HAND SURGERY Right     thumb    • JOINT REPLACEMENT      lt knee   • TOTAL SHOULDER ARTHROPLASTY W/ DISTAL CLAVICLE EXCISION Right 1/18/2018    Procedure: Reverse Total Shoulder Arthroplasty;  Surgeon: Alex Ordaz MD;  Location: [facility];  Service:    • TOTAL SHOULDER ARTHROPLASTY W/ DISTAL CLAVICLE EXCISION Left 6/28/2018    Procedure: TOTAL SHOULDER REVERSE ARTHROPLASTY;  Surgeon: Alex Ordaz MD;  Location: [facility];  Service: Orthopedics       Family History:  Family History   Problem Relation Age of Onset   • Malig Hyperthermia Neg Hx    • Colon cancer Neg Hx        Social History:   reports that he quit smoking about 27 years ago. His smoking use included cigarettes. He quit after 15.00 years of use. He has never used smokeless tobacco. He reports current alcohol use. He reports that he does not use drugs.    Medications:   Medications Prior to Admission   Medication Sig Dispense Refill Last Dose   • amitriptyline (ELAVIL) 50 MG tablet Take 50 mg  "by mouth Every Night.      • aspirin 81 MG EC tablet Take 81 mg by mouth Every Morning.      • calcium citrate-vitamin d (CALCITRATE) 315-250 MG-UNIT tablet tablet Take 2 tablets by mouth 2 (Two) Times a Day.      • cephalexin (KEFLEX) 500 MG capsule Take 500 mg by mouth Every Morning.      • cetirizine (zyrTEC) 10 MG tablet Take 10 mg by mouth Daily As Needed for Allergies.      • docusate sodium 100 MG capsule Take 100 mg by mouth 2 (Two) Times a Day.      • ferrous sulfate 325 (65 FE) MG tablet Take 325 mg by mouth 2 (Two) Times a Day.      • finasteride (PROPECIA) 1 MG tablet       • finasteride (PROSCAR) 5 MG tablet Take 5 mg by mouth Every Morning.      • furosemide (LASIX) 20 MG tablet Take 20 mg by mouth Every Morning.      • gabapentin (NEURONTIN) 400 MG capsule Take 800 mg by mouth 3 (Three) Times a Day.      • hydrocortisone 2.5 % cream Apply 1 application topically As Needed.      • lisinopril (PRINIVIL,ZESTRIL) 20 MG tablet Take 10 mg by mouth Every Morning. 1/2 tab      • Omega-3 Fatty Acids (FISH OIL) 1000 MG capsule capsule Take 1,000 mg by mouth 3 (Three) Times a Day With Meals.      • omeprazole (priLOSEC) 20 MG capsule Take 20 mg by mouth Every Evening.      • oxyCODONE-acetaminophen (PERCOCET) 7.5-325 MG per tablet Take 1 tablet by mouth Every 4 (Four) Hours As Needed for Moderate Pain . 50 tablet 0    • polyethylene glycol (GoLYTELY) 236 g solution May add flavor packet. TAKE AS DIRECTED FOR BOWEL PREP 4000 mL 0    • simvastatin (ZOCOR) 20 MG tablet Take 10 mg by mouth Every Night.      • vitamin B-12 (CYANOCOBALAMIN) 500 MCG tablet Take 500 mcg by mouth Every Other Day.          Allergies:  Sulfa antibiotics        Objective     Blood pressure 121/62, pulse 64, temperature 97.6 °F (36.4 °C), temperature source Temporal, resp. rate 20, height 162.6 cm (64\"), weight 102 kg (225 lb 12 oz), SpO2 96 %.    Physical Exam   Constitutional: Pt is oriented to person, place, and time and well-developed, " well-nourished, and in no distress.   Mouth/Throat: Oropharynx is clear and moist.   Neck: Normal range of motion.   Cardiovascular: Normal rate, regular rhythm and normal heart sounds.    Pulmonary/Chest: Effort normal and breath sounds normal.   Abdominal: Soft. Nontender  Skin: Skin is warm and dry.   Psychiatric: Mood, memory, affect and judgment normal.     Assessment & Plan     Diagnosis:  Screening colonoscopy  H/o colon polyps     Anticipated Surgical Procedure:  colonoscopy    The risks, benefits, and alternatives of this procedure have been discussed with the patient or the responsible party- the patient understands and agrees to proceed.

## 2022-09-21 NOTE — PROGRESS NOTES
NEPH[facility]Y [facility]     As our patient we look forward to providing you with the best quality of care and helping to facilitate your needs.    On your way out, you may schedule your next appointment at the reception desk where you checked in at, or you can call us back at the number noted below, to schedule with Dr. Radha Velásquez, in the Nephrology Dept. (kidney doctor)    Your next appointment with Dr. Radha Velásquez is due in: 1 week    Your NON-FASTING LABS will be due few days prior to your next office visit. These orders will be entered in the system. You may present to any [facility] lab for your tests, no appointment is necessary *Results of these tests will then be given to you at your next scheduled office visit with     For any tests that you may need, please see: \"Non-Medication Orders\"section   Continue Clonidine 0.2 mg /patch TTS  Take diltiazem  daily at noon daily   Take lisinopril 20 mg daily at 6: 00 pm   If SBP > 150 mm Hg , start taking lisinopril twice a day   __________________________________________________________________________________________    Appointment hours for Dr. Velásquez are: 7:30am to 5:00pm every Wednesday and Friday.      [facility]    (Wed. & Fri.)  [address] [place] [id number]  Phone: [phone]  Fax: [phone]          Additional Educational Resources:  For additional resources regarding your symptoms, diagnosis, or further health information, please visit the Health Resources section on [email or web address] or the Online Health Resources section in [email or web address].    Jermaine DAKOTA Adeel  REASON FOR CALL Colonoscopy Screening, Hx Colon Polyps  SENT IN PREP: Nulinaelise  Past Medical History:   Diagnosis Date   • Anemia    • Arthritis    • BPH (benign prostatic hyperplasia)    • Cancer (HCC)    • Diabetes mellitus (HCC)     no meds now diet controlled was on insulin and then meds   • Elevated cholesterol    • GERD (gastroesophageal reflux disease)    • High cholesterol    • History of hepatitis C     treated medically   • History of kidney stones    • History of transfusion    • Hypertension    • Psoriasis    • Shoulder pain    • Sleep apnea     bipap   • Urethral stricture     has F/C in place  caths self prn   • UTI (urinary tract infection)     freq uti     Allergies   Allergen Reactions   • Sulfa Antibiotics Hives     Past Surgical History:   Procedure Laterality Date   • ANKLE SURGERY Right     orif with hardware   • CARPAL TUNNEL RELEASE Left    • COLONOSCOPY     • ENDOSCOPY     • GASTRIC BYPASS         • HAND SURGERY Right     thumb    • JOINT REPLACEMENT      lt knee   • TOTAL SHOULDER ARTHROPLASTY W/ DISTAL CLAVICLE EXCISION Right 2018    Procedure: Reverse Total Shoulder Arthroplasty;  Surgeon: Alex Ordaz MD;  Location: [facility];  Service:    • TOTAL SHOULDER ARTHROPLASTY W/ DISTAL CLAVICLE EXCISION Left 2018    Procedure: TOTAL SHOULDER REVERSE ARTHROPLASTY;  Surgeon: Alex Ordaz MD;  Location: [facility];  Service: Orthopedics     Social History     Socioeconomic History   • Marital status:    Tobacco Use   • Smoking status: Former Smoker     Years: 15.00     Types: Cigarettes     Quit date:      Years since quittin.1   • Smokeless tobacco: Never Used   Vaping Use   • Vaping Use: Never used   Substance and Sexual Activity   • Alcohol use: Yes     Comment: bourbon/beer on occasion   • Drug use: No   • Sexual activity: Defer     Family History   Problem Relation Age of Onset   • Malig Hyperthermia Neg Hx    • Colon cancer Neg Hx         Current Outpatient Medications:   •  amitriptyline (ELAVIL) 50 MG tablet, Take 50 mg by mouth Every Night., Disp: , Rfl:   •  aspirin 81 MG EC tablet, Take 81 mg by mouth Every Morning., Disp: , Rfl:   •  calcium citrate-vitamin d (CALCITRATE) 315-250 MG-UNIT tablet tablet, Take 2 tablets by mouth 2 (Two) Times a Day., Disp: , Rfl:   •  cephalexin (KEFLEX) 500 MG capsule, Take 500 mg by mouth Every Morning., Disp: , Rfl:   •  cetirizine (zyrTEC) 10 MG tablet, Take 10 mg by mouth Daily As Needed for Allergies., Disp: , Rfl:   •  docusate sodium 100 MG capsule, Take 100 mg by mouth 2 (Two) Times a Day., Disp: , Rfl:   •  ferrous sulfate 325 (65 FE) MG tablet, Take 325 mg by mouth 2 (Two) Times a Day., Disp: , Rfl:   •  finasteride (PROPECIA) 1 MG tablet, , Disp: , Rfl:   •  finasteride (PROSCAR) 5 MG tablet, Take 5 mg by mouth Every Morning., Disp: , Rfl:   •  furosemide (LASIX) 20 MG tablet, Take 20 mg by mouth Every Morning., Disp: , Rfl:   •  gabapentin (NEURONTIN) 400 MG capsule, Take 800 mg by mouth 3 (Three) Times a Day., Disp: , Rfl:   •  hydrocortisone 2.5 % cream, Apply 1 application topically As Needed., Disp: , Rfl:   •  lisinopril (PRINIVIL,ZESTRIL) 20 MG tablet, Take 10 mg by mouth Every Morning. 1/2 tab, Disp: , Rfl:   •  Omega-3 Fatty Acids (FISH OIL) 1000 MG capsule capsule, Take 1,000 mg by mouth 3 (Three) Times a Day With Meals., Disp: , Rfl:   •  omeprazole (priLOSEC) 20 MG capsule, Take 20 mg by mouth Every Evening., Disp: , Rfl:   •  oxyCODONE-acetaminophen (PERCOCET) 7.5-325 MG per tablet, Take 1 tablet by mouth Every 4 (Four) Hours As Needed for Moderate Pain ., Disp: 50 tablet, Rfl: 0  •  simvastatin (ZOCOR) 20 MG tablet, Take 10 mg by mouth Every Night., Disp: , Rfl:   •  vitamin B-12 (CYANOCOBALAMIN) 500 MCG tablet, Take 500 mcg by mouth Every Other Day., Disp: , Rfl:   No current facility-administered medications for this visit.    Facility-Administered Medications Ordered in Other  Visits:   •  mupirocin (BACTROBAN) 2 % nasal ointment, , Nasal, BID, Alex Ordaz MD

## 2022-10-26 ENCOUNTER — HOSPITAL ENCOUNTER (EMERGENCY)
Facility: HOSPITAL | Age: 77
Discharge: HOME | End: 2022-10-26
Attending: EMERGENCY MEDICINE | Admitting: EMERGENCY MEDICINE

## 2022-10-26 VITALS
OXYGEN SATURATION: 100 % | HEIGHT: 64 IN | DIASTOLIC BLOOD PRESSURE: 46 MMHG | BODY MASS INDEX: 40.01 KG/M2 | WEIGHT: 234.35 LBS | TEMPERATURE: 98.2 F | SYSTOLIC BLOOD PRESSURE: 121 MMHG | RESPIRATION RATE: 16 BRPM | HEART RATE: 60 BPM

## 2022-10-26 DIAGNOSIS — M46.40 DISCITIS, UNSPECIFIED SPINAL REGION: ICD-10-CM

## 2022-10-26 DIAGNOSIS — R93.89 ABNORMAL MRI, NECK: Primary | ICD-10-CM

## 2022-10-26 LAB
ALBUMIN SERPL-MCNC: 3.9 G/DL (ref 3.5–5.2)
ALBUMIN/GLOB SERPL: 1.7 G/DL
ALP SERPL-CCNC: 86 U/L (ref 39–117)
ALT SERPL W P-5'-P-CCNC: 48 U/L (ref 1–41)
ANION GAP SERPL CALCULATED.3IONS-SCNC: 9.9 MMOL/L (ref 5–15)
AST SERPL-CCNC: 36 U/L (ref 1–40)
BASOPHILS # BLD AUTO: 0.04 10*3/MM3 (ref 0–0.2)
BASOPHILS NFR BLD AUTO: 0.8 % (ref 0–1.5)
BILIRUB SERPL-MCNC: 0.4 MG/DL (ref 0–1.2)
BUN SERPL-MCNC: 18 MG/DL (ref 8–23)
BUN/CREAT SERPL: 13.7 (ref 7–25)
CALCIUM SPEC-SCNC: 9.4 MG/DL (ref 8.6–10.5)
CHLORIDE SERPL-SCNC: 105 MMOL/L (ref 98–107)
CO2 SERPL-SCNC: 27.1 MMOL/L (ref 22–29)
CREAT SERPL-MCNC: 1.31 MG/DL (ref 0.76–1.27)
CRP SERPL-MCNC: <0.3 MG/DL (ref 0–0.5)
DEPRECATED RDW RBC AUTO: 48.8 FL (ref 37–54)
EGFRCR SERPLBLD CKD-EPI 2021: 56.4 ML/MIN/1.73
EOSINOPHIL # BLD AUTO: 0.15 10*3/MM3 (ref 0–0.4)
EOSINOPHIL NFR BLD AUTO: 3.2 % (ref 0.3–6.2)
ERYTHROCYTE [DISTWIDTH] IN BLOOD BY AUTOMATED COUNT: 13.9 % (ref 12.3–15.4)
ERYTHROCYTE [SEDIMENTATION RATE] IN BLOOD: 5 MM/HR (ref 0–20)
GLOBULIN UR ELPH-MCNC: 2.3 GM/DL
GLUCOSE SERPL-MCNC: 190 MG/DL (ref 65–99)
HCT VFR BLD AUTO: 34.6 % (ref 37.5–51)
HGB BLD-MCNC: 11.7 G/DL (ref 13–17.7)
IMM GRANULOCYTES # BLD AUTO: 0.01 10*3/MM3 (ref 0–0.05)
IMM GRANULOCYTES NFR BLD AUTO: 0.2 % (ref 0–0.5)
LYMPHOCYTES # BLD AUTO: 1.41 10*3/MM3 (ref 0.7–3.1)
LYMPHOCYTES NFR BLD AUTO: 29.7 % (ref 19.6–45.3)
MCH RBC QN AUTO: 32.4 PG (ref 26.6–33)
MCHC RBC AUTO-ENTMCNC: 33.8 G/DL (ref 31.5–35.7)
MCV RBC AUTO: 95.8 FL (ref 79–97)
MONOCYTES # BLD AUTO: 0.5 10*3/MM3 (ref 0.1–0.9)
MONOCYTES NFR BLD AUTO: 10.5 % (ref 5–12)
NEUTROPHILS NFR BLD AUTO: 2.63 10*3/MM3 (ref 1.7–7)
NEUTROPHILS NFR BLD AUTO: 55.6 % (ref 42.7–76)
NRBC BLD AUTO-RTO: 0 /100 WBC (ref 0–0.2)
PLATELET # BLD AUTO: 199 10*3/MM3 (ref 140–450)
PMV BLD AUTO: 10.3 FL (ref 6–12)
POTASSIUM SERPL-SCNC: 4.5 MMOL/L (ref 3.5–5.2)
PROT SERPL-MCNC: 6.2 G/DL (ref 6–8.5)
RBC # BLD AUTO: 3.61 10*6/MM3 (ref 4.14–5.8)
SODIUM SERPL-SCNC: 142 MMOL/L (ref 136–145)
WBC NRBC COR # BLD: 4.74 10*3/MM3 (ref 3.4–10.8)

## 2022-10-26 PROCEDURE — 99283 EMERGENCY DEPT VISIT LOW MDM: CPT

## 2022-10-26 PROCEDURE — 80053 COMPREHEN METABOLIC PANEL: CPT

## 2022-10-26 PROCEDURE — 85025 COMPLETE CBC W/AUTO DIFF WBC: CPT

## 2022-10-26 PROCEDURE — 86140 C-REACTIVE PROTEIN: CPT

## 2022-10-26 PROCEDURE — 85652 RBC SED RATE AUTOMATED: CPT

## 2022-10-27 ENCOUNTER — TELEPHONE (OUTPATIENT)
Dept: NEUROSURGERY | Facility: CLINIC | Age: 77
End: 2022-10-27

## 2022-10-27 NOTE — TELEPHONE ENCOUNTER
Per Dr. Blas to get patient.  I called and scheduled patient for 10/28 at 3.  [facility] is going to [facility] to  CD of images to bring to appointment.  Gave patient our address and [address]

## 2022-10-27 NOTE — TELEPHONE ENCOUNTER
Patient called back, I discussed again to bring a copy of the disc.  He also said he would have them to fax a copy of the report to the office.  He understood and was going to get a copy of the disc.

## 2022-10-27 NOTE — TELEPHONE ENCOUNTER
----- Message from GRANT Friend sent at 10/26/2022  7:01 PM EDT -----  Regarding: Schedule ASAP  Please schedule this patient ASAP for follow up in the clinic with his MRI disc from [facility].

## 2022-10-28 ENCOUNTER — OFFICE VISIT (OUTPATIENT)
Dept: NEUROSURGERY | Facility: CLINIC | Age: 77
End: 2022-10-28

## 2022-10-28 VITALS
HEIGHT: 64 IN | BODY MASS INDEX: 39.95 KG/M2 | SYSTOLIC BLOOD PRESSURE: 126 MMHG | WEIGHT: 234 LBS | DIASTOLIC BLOOD PRESSURE: 72 MMHG

## 2022-10-28 DIAGNOSIS — M54.31 BILATERAL SCIATICA: ICD-10-CM

## 2022-10-28 DIAGNOSIS — M54.2 CERVICALGIA: Primary | ICD-10-CM

## 2022-10-28 DIAGNOSIS — M54.32 BILATERAL SCIATICA: ICD-10-CM

## 2022-10-28 PROCEDURE — 99204 OFFICE O/P NEW MOD 45 MIN: CPT | Performed by: NEUROLOGICAL SURGERY

## 2022-10-28 NOTE — PROGRESS NOTES
Patient ID: Jermaine Denis is a 77 y.o. male is being seen for consultation today at the request of No ref. provider found.  Patient comes in today with low back pain. MRI completed at [facility]. He reports back pain that radiates into the right leg with numbness. He denies tingling and weakness.   Subjective     The patient is here in regards to   Chief Complaint   Patient presents with   • Back Pain       History of Present Illness  Jermaine has been dealing with low back pain for 3 to 4 years and cervicalgia for about 6 months.  He does a lot of work around the house and he is had a lot of orthopedic procedures including knee replacements, hip replacements, ankle surgery, left wrist surgery.  He says that his neck pain is worse when he sleeping on his left side but some proved with his right side.  He treats with over-the-counter medications and rest.  His low back pain he describes as bilateral buttock pain radiating down the back of his thighs but does not make it past his knees.  It centered to the sides of his low back and not towards the midline.  He says it hurts when he is try to get up stairs or walking in general.  He was seen recently at the ED in outside hospital and his ESR and CRP and WBC were all within normal limits.      While in the room and during my examination of the patient I wore a mask and eye protection.  I washed my hands before and after this patient encounter.  The patient was also wearing a mask.    The following portions of the patient's history were reviewed and updated as appropriate: allergies, current medications, past family history, past medical history, past social history, past surgical history and problem list.    Review of Systems   Respiratory: Negative for chest tightness and shortness of breath.    Cardiovascular: Negative for chest pain.   Musculoskeletal: Positive for back pain.        Past Medical History:   Diagnosis Date   • Anemia    • Arthritis    • BPH (benign prostatic  hyperplasia)    • Cancer (HCC)    • Diabetes mellitus (HCC)     no meds now diet controlled was on insulin and then meds   • Elevated cholesterol    • GERD (gastroesophageal reflux disease)    • High cholesterol    • History of hepatitis C     treated medically   • History of kidney stones    • History of transfusion    • Hypertension    • Psoriasis    • Shoulder pain    • Sleep apnea     bipap   • Urethral stricture     has F/C in place  caths self prn   • UTI (urinary tract infection)     freq uti       Allergies   Allergen Reactions   • Sulfa Antibiotics Hives       Family History   Problem Relation Age of Onset   • Malig Hyperthermia Neg Hx    • Colon cancer Neg Hx        Social History     Socioeconomic History   • Marital status:    Tobacco Use   • Smoking status: Former     Years: 15.00     Types: Cigarettes     Quit date:      Years since quittin.8   • Smokeless tobacco: Never   Vaping Use   • Vaping Use: Never used   Substance and Sexual Activity   • Alcohol use: Yes     Comment: bourbon/beer on occasion   • Drug use: No   • Sexual activity: Defer       Past Surgical History:   Procedure Laterality Date   • ANKLE SURGERY Right     orif with hardware   • CARPAL TUNNEL RELEASE Left    • COLONOSCOPY     • COLONOSCOPY N/A 2022    Procedure: COLONOSCOPY FOR SCREENING;  Surgeon: Mik Joaquin MD;  Location: [facility] ENDOSCOPY;  Service: Gastroenterology;  Laterality: N/A;  DIVERTICULOSIS, hemorrhoids   • ENDOSCOPY     • GASTRIC BYPASS         • HAND SURGERY Right     thumb    • JOINT REPLACEMENT      lt knee   • TOTAL SHOULDER ARTHROPLASTY W/ DISTAL CLAVICLE EXCISION Right 2018    Procedure: Reverse Total Shoulder Arthroplasty;  Surgeon: Alex Ordaz MD;  Location: [facility];  Service:    • TOTAL SHOULDER ARTHROPLASTY W/ DISTAL CLAVICLE EXCISION Left 2018    Procedure: TOTAL SHOULDER REVERSE ARTHROPLASTY;  Surgeon: Alex Ordaz MD;  Location: [facility];   Service: Orthopedics         Objective     Vitals:    10/28/22 1449   BP: 126/72     Body mass index is 40.17 kg/m².    Physical Exam  Constitutional:       Appearance: Normal appearance.   HENT:      Head: Normocephalic and atraumatic.   Eyes:      Extraocular Movements: Extraocular movements intact.      Conjunctiva/sclera: Conjunctivae normal.      Pupils: Pupils are equal, round, and reactive to light.   Cardiovascular:      Rate and Rhythm: Normal rate and regular rhythm.      Pulses: Normal pulses.   Pulmonary:      Breath sounds: Normal breath sounds.   Abdominal:      Palpations: Abdomen is soft.   Musculoskeletal:         General: Normal range of motion.      Cervical back: Normal range of motion and neck supple.      Comments: Bilateral Yehuda sign positive, Derick's negative   Skin:     General: Skin is warm and dry.   Neurological:      Mental Status: He is alert and oriented to person, place, and time.      Cranial Nerves: Cranial nerves are intact and 2-12 are intact.      Motor: Motor function is intact. No weakness or atrophy.      Coordination: Coordination is intact. Romberg sign negative. Romberg Test normal.      Gait: Gait is intact. Gait normal.      Deep Tendon Reflexes: Reflexes are normal and symmetric.      Reflex Scores:       Tricep reflexes are 2+ on the right side and 2+ on the left side.       Bicep reflexes are 2+ on the right side and 2+ on the left side.       Brachioradialis reflexes are 2+ on the right side and 2+ on the left side.       Patellar reflexes are 2+ on the right side and 2+ on the left side.       Achilles reflexes are 2+ on the right side and 2+ on the left side.  Psychiatric:         Speech: Speech normal.         Neurologic Exam     Mental Status   Oriented to person, place, and time.   Attention: normal. Concentration: normal.   Speech: speech is normal   Level of consciousness: alert    Cranial Nerves   Cranial nerves II through XII intact.     CN III, IV, VI    Pupils are equal, round, and reactive to light.    Motor Exam   Muscle bulk: normal  Overall muscle tone: normal    Strength   Strength 5/5 except as noted.     Sensory Exam   Light touch normal.     Gait, Coordination, and Reflexes     Gait  Gait: normal    Coordination   Romberg: negative    Reflexes   Reflexes 2+ except as noted.   Right brachioradialis: 2+  Left brachioradialis: 2+  Right biceps: 2+  Left biceps: 2+  Right triceps: 2+  Left triceps: 2+  Right patellar: 2+  Left patellar: 2+  Right achilles: 2+  Left achilles: 2+      Assessment & Plan   Independent Review of Radiographic Studies:      I personally reviewed the images from the following studies.    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  There is also degenerative changes throughout the rest of the cervical spine with loss of normal lordosis.  There is narrowing of the canal due to congenitally short pedicles but not a focal area with severe stenosis.    Assessment/Plan: Jermaine may have bilateral SI joint inflammation although he has Yehuda sign but not Derick's sign.  I do think it is worthwhile to get a lumbar MRI first to see if he has any other possible causes of sciatica.  In regards to his neck, I have low suspicion that is osteomyelitis but will get a repeat MRI in 3 months with contrast to rule out discitis/osteomyelitis and will obtain repeat ESR and CRP at that time.    Medical Decision Making:      MRI of the cervical spine with and without contrast in 3 months  MRI of the lumbar spine without contrast in 3 months  Follow-up in 3 months         Diagnoses and all orders for this visit:    1. Cervicalgia (Primary)  -     MRI Cervical Spine With & Without Contrast;  Future  -     Sedimentation Rate; Future  -     C-reactive Protein; Future    2. Bilateral sciatica  -     MRI Lumbar Spine Without Contrast; Future             Patient Instructions/Recommendations:    Call with any questions or concerns      Indio Blas MD  10/28/22  15:19 EDT

## 2022-10-28 NOTE — TELEPHONE ENCOUNTER
I called and LM for patient stating that we had some appointments cancel and was seeing if we could move his appointment up.  I asked patient to give the office a call back.

## 2022-12-13 ENCOUNTER — HOSPITAL ENCOUNTER (OUTPATIENT)
Dept: MRI IMAGING | Facility: HOSPITAL | Age: 77
Discharge: HOME | End: 2022-12-13

## 2022-12-13 DIAGNOSIS — M54.2 CERVICALGIA: ICD-10-CM

## 2022-12-13 DIAGNOSIS — M54.32 BILATERAL SCIATICA: ICD-10-CM

## 2022-12-13 DIAGNOSIS — M54.31 BILATERAL SCIATICA: ICD-10-CM

## 2022-12-13 LAB
CREAT BLDA-MCNC: 1 MG/DL
EGFRCR SERPLBLD CKD-EPI 2021: 78 ML/MIN/1.73

## 2022-12-13 PROCEDURE — 82565 ASSAY OF CREATININE: CPT

## 2022-12-13 PROCEDURE — 0 GADOBENATE DIMEGLUMINE 529 MG/ML SOLUTION: Performed by: NEUROLOGICAL SURGERY

## 2022-12-13 PROCEDURE — 72148 MRI LUMBAR SPINE W/O DYE: CPT

## 2022-12-13 PROCEDURE — 72156 MRI NECK SPINE W/O & W/DYE: CPT

## 2022-12-13 PROCEDURE — A9577 INJ MULTIHANCE: HCPCS | Performed by: NEUROLOGICAL SURGERY

## 2022-12-13 RX ADMIN — GADOBENATE DIMEGLUMINE 20 ML: 529 INJECTION, SOLUTION INTRAVENOUS at 14:45

## 2023-01-16 NOTE — PROGRESS NOTES
Patient ID: Jermaine Denis is a 77 y.o. male is an established patient with   Chief Complaint   Patient presents with   • Follow-up   • Back Pain   • Leg Pain   • Neck Pain   • Numbness   • Tingling   • Headache       Today, Jermaine Denis reports lower back and neck pain.  He reports stiffness of neck radiates bilateral shoulders. He reports radiating pain, numbness and weakness in left leg. He reports headaches, dizziness and  light headedness intermittent.  He reports left buttock burns and aches.  Denies loss of bowel/bladder incontinence. He reports  steroid injection in lower back moderate relief.  He reports no injection in neck.    Subjective:     History of Present Illness  Jermaine has stiffness of his neck in the morning with no radiculopathy down his arms.  He also complains of right-sided L4 and L5 radiculopathy and some difficulty ambulating due to this.  Has been using a cane for several months now.  He is up epidural steroid injections which give him some relief temporarily but not for a significant amount of time.      Review of Systems   Constitutional: Positive for activity change.   Respiratory: Negative for chest tightness and shortness of breath.    Musculoskeletal: Positive for back pain, neck pain and neck stiffness.   Neurological: Positive for dizziness, weakness and light-headedness.   Psychiatric/Behavioral: Positive for sleep disturbance.        While in the room and during my examination of the patient I wore a mask and eye protection.  I washed my hands before and after this patient encounter.  The patient was also wearing a mask.    The following portions of the patient's history were reviewed and updated as appropriate: allergies, current medications, past family history, past medical history, past social history, past surgical history and problem list.     Objective:    Vitals:    01/27/23 1031   BP: 140/78   Pulse: 76   SpO2: 97%     There is no height or weight on file to calculate  BMI.    Physical Exam  Constitutional:       Appearance: Normal appearance.   HENT:      Head: Normocephalic and atraumatic.   Eyes:      Extraocular Movements: Extraocular movements intact.      Conjunctiva/sclera: Conjunctivae normal.      Pupils: Pupils are equal, round, and reactive to light.   Cardiovascular:      Rate and Rhythm: Normal rate and regular rhythm.      Pulses: Normal pulses.   Pulmonary:      Breath sounds: Normal breath sounds.   Abdominal:      Palpations: Abdomen is soft.   Musculoskeletal:         General: Normal range of motion.      Cervical back: Normal range of motion and neck supple.   Skin:     General: Skin is warm and dry.   Neurological:      Mental Status: He is alert and oriented to person, place, and time.      Cranial Nerves: Cranial nerves 2-12 are intact.      Motor: Motor function is intact. No weakness or atrophy.      Coordination: Coordination is intact. Romberg sign negative. Romberg Test normal.      Gait: Gait is intact. Gait normal.      Deep Tendon Reflexes: Reflexes are normal and symmetric.      Reflex Scores:       Tricep reflexes are 2+ on the right side and 2+ on the left side.       Bicep reflexes are 2+ on the right side and 2+ on the left side.       Brachioradialis reflexes are 2+ on the right side and 2+ on the left side.       Patellar reflexes are 2+ on the right side and 2+ on the left side.       Achilles reflexes are 2+ on the right side and 2+ on the left side.  Psychiatric:         Speech: Speech normal.       Neurologic Exam     Mental Status   Oriented to person, place, and time.   Attention: normal. Concentration: normal.   Speech: speech is normal   Level of consciousness: alert    Cranial Nerves   Cranial nerves II through XII intact.     CN III, IV, VI   Pupils are equal, round, and reactive to light.    Motor Exam   Muscle bulk: normal  Overall muscle tone: normal    Strength   Strength 5/5 except as noted.     Sensory Exam   Light touch normal.      Gait, Coordination, and Reflexes     Gait  Gait: normal    Coordination   Romberg: negative    Reflexes   Reflexes 2+ except as noted.   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  There is also L4-5 severe facet arthropathy resulting in bilateral foraminal and lateral recess stenosis.  There is also synovial cyst at the left L3-4 space    Assessment/Plan: I think that Jermaine would benefit from an L3-4 and L4-5 TLIF with posterior instrumentation across his previous autofusion at L5-S1 to the pelvis.  This would involve screws at the L3, L4, L5, S1, S2 pedicles with interbody's at the L3 and L4 spaces.  We discussed the risks and benefits and alternatives of surgery.  He understands and is willing to proceed with surgery.       Diagnoses and all orders for this visit:    1. Lumbar radiculopathy (Primary)  -     XR Spine Scoliosis 2 or 3 Views  -     dexa bone density axial  -     XR Spine Lumbar Complete With Flex & Ext  -     Case Request; Standing  -     CBC & Differential; Future  -     Comprehensive Metabolic Panel; Future  -     aPTT; Future  -     Protime-INR; Future  -     Type & Screen; Future  -     ceFAZolin (ANCEF) 2 g in sodium chloride 0.9 % 100 mL IVPB  -     Case Request    2. Synovial cyst of lumbar facet joint  -     Case Request; Standing  -     CBC & Differential; Future  -     Comprehensive Metabolic Panel; Future  -     aPTT; Future  -     Protime-INR; Future  -     Type & Screen; Future  -     ceFAZolin (ANCEF) 2 g in sodium chloride 0.9 % 100 mL IVPB  -     Case Request    Other orders  -     Follow Anesthesia Guidelines /  Protocol; Future  -     Obtain Informed Consent; Future  -     Provide NPO Instructions to Patient; Future  -     Chlorhexidine Skin Prep; Future  -     Follow Anesthesia Guidelines / Protocol; Standing  -     Verify / Perform Chlorhexidine Skin Prep; Standing  -     Inpatient Admission; Standing                Indio Blas MD  01/27/23  11:05 EST

## 2023-01-27 ENCOUNTER — OFFICE VISIT (OUTPATIENT)
Dept: NEUROSURGERY | Facility: CLINIC | Age: 78
End: 2023-01-27
Payer: OTHER GOVERNMENT

## 2023-01-27 ENCOUNTER — HOSPITAL ENCOUNTER (OUTPATIENT)
Dept: GENERAL RADIOLOGY | Facility: HOSPITAL | Age: 78
Discharge: HOME | End: 2023-01-27
Admitting: NEUROLOGICAL SURGERY
Payer: OTHER GOVERNMENT

## 2023-01-27 VITALS — OXYGEN SATURATION: 97 % | DIASTOLIC BLOOD PRESSURE: 78 MMHG | HEART RATE: 76 BPM | SYSTOLIC BLOOD PRESSURE: 140 MMHG

## 2023-01-27 DIAGNOSIS — M54.16 LUMBAR RADICULOPATHY: Primary | ICD-10-CM

## 2023-01-27 DIAGNOSIS — M71.38 SYNOVIAL CYST OF LUMBAR FACET JOINT: ICD-10-CM

## 2023-01-27 PROCEDURE — 99214 OFFICE O/P EST MOD 30 MIN: CPT | Performed by: NEUROLOGICAL SURGERY

## 2023-01-27 PROCEDURE — 72114 X-RAY EXAM L-S SPINE BENDING: CPT

## 2023-01-27 RX ORDER — CEFAZOLIN SODIUM 2 G/100ML
2 INJECTION, SOLUTION INTRAVENOUS ONCE
Status: CANCELLED | OUTPATIENT
Start: 2023-03-20 | End: 2023-01-27

## 2023-02-01 ENCOUNTER — HOSPITAL ENCOUNTER (OUTPATIENT)
Dept: BONE DENSITY | Facility: HOSPITAL | Age: 78
Discharge: HOME | End: 2023-02-01
Admitting: NEUROLOGICAL SURGERY
Payer: OTHER GOVERNMENT

## 2023-02-01 PROCEDURE — 77080 DXA BONE DENSITY AXIAL: CPT

## 2023-02-23 ENCOUNTER — TELEPHONE (OUTPATIENT)
Dept: NEUROSURGERY | Facility: CLINIC | Age: 78
End: 2023-02-23
Payer: OTHER GOVERNMENT

## 2023-02-23 ENCOUNTER — PREP FOR SURGERY (OUTPATIENT)
Dept: OTHER | Facility: HOSPITAL | Age: 78
End: 2023-02-23
Payer: OTHER GOVERNMENT

## 2023-02-23 DIAGNOSIS — M43.16 SPONDYLOLISTHESIS, LUMBAR REGION: Primary | ICD-10-CM

## 2023-02-23 NOTE — TELEPHONE ENCOUNTER
Called patient to ask if he would like to have surgery early on 02/27 than on March 20. Patient stated yes. Moved patient surgery to 02/27 at 12 pm with a 9:30 am arrival time. Changed patient PAT to 02/24 at 1pm. Changed patient PO to 03/15 at 9:30 am.

## 2023-02-23 NOTE — TELEPHONE ENCOUNTER
Called patient to inform him that his surgery time has changed to 5:15 am arrival for a 7 am surgery. Additionally informed patient that he will get a CT tomorrow 02/24 after his PAT.

## 2023-02-24 ENCOUNTER — HOSPITAL ENCOUNTER (OUTPATIENT)
Dept: CT IMAGING | Facility: HOSPITAL | Age: 78
Discharge: HOME | End: 2023-02-24
Payer: OTHER GOVERNMENT

## 2023-02-24 ENCOUNTER — PRE-ADMISSION TESTING (OUTPATIENT)
Dept: PREADMISSION TESTING | Facility: HOSPITAL | Age: 78
End: 2023-02-24
Payer: OTHER GOVERNMENT

## 2023-02-24 VITALS
DIASTOLIC BLOOD PRESSURE: 83 MMHG | OXYGEN SATURATION: 97 % | HEIGHT: 64 IN | RESPIRATION RATE: 20 BRPM | SYSTOLIC BLOOD PRESSURE: 168 MMHG | BODY MASS INDEX: 39.95 KG/M2 | WEIGHT: 234 LBS | HEART RATE: 68 BPM | TEMPERATURE: 97.9 F

## 2023-02-24 DIAGNOSIS — M71.38 SYNOVIAL CYST OF LUMBAR FACET JOINT: ICD-10-CM

## 2023-02-24 DIAGNOSIS — M54.16 LUMBAR RADICULOPATHY: ICD-10-CM

## 2023-02-24 LAB
ABO GROUP BLD: NORMAL
ALBUMIN SERPL-MCNC: 3.7 G/DL (ref 3.5–5.2)
ALBUMIN/GLOB SERPL: 1.9 G/DL
ALP SERPL-CCNC: 76 U/L (ref 39–117)
ALT SERPL W P-5'-P-CCNC: 47 U/L (ref 1–41)
ANION GAP SERPL CALCULATED.3IONS-SCNC: 7 MMOL/L (ref 5–15)
APTT PPP: 29.6 SECONDS (ref 22.7–35.4)
AST SERPL-CCNC: 32 U/L (ref 1–40)
BASOPHILS # BLD AUTO: 0.02 10*3/MM3 (ref 0–0.2)
BASOPHILS NFR BLD AUTO: 0.4 % (ref 0–1.5)
BILIRUB SERPL-MCNC: 0.4 MG/DL (ref 0–1.2)
BLD GP AB SCN SERPL QL: NEGATIVE
BUN SERPL-MCNC: 17 MG/DL (ref 8–23)
BUN/CREAT SERPL: 16.5 (ref 7–25)
CALCIUM SPEC-SCNC: 9.6 MG/DL (ref 8.6–10.5)
CHLORIDE SERPL-SCNC: 105 MMOL/L (ref 98–107)
CO2 SERPL-SCNC: 28 MMOL/L (ref 22–29)
CREAT SERPL-MCNC: 1.03 MG/DL (ref 0.76–1.27)
DEPRECATED RDW RBC AUTO: 45.2 FL (ref 37–54)
EGFRCR SERPLBLD CKD-EPI 2021: 75.3 ML/MIN/1.73
EOSINOPHIL # BLD AUTO: 0.15 10*3/MM3 (ref 0–0.4)
EOSINOPHIL NFR BLD AUTO: 3.1 % (ref 0.3–6.2)
ERYTHROCYTE [DISTWIDTH] IN BLOOD BY AUTOMATED COUNT: 12.6 % (ref 12.3–15.4)
GLOBULIN UR ELPH-MCNC: 2 GM/DL
GLUCOSE SERPL-MCNC: 127 MG/DL (ref 65–99)
HCT VFR BLD AUTO: 35.1 % (ref 37.5–51)
HGB BLD-MCNC: 11.4 G/DL (ref 13–17.7)
IMM GRANULOCYTES # BLD AUTO: 0 10*3/MM3 (ref 0–0.05)
IMM GRANULOCYTES NFR BLD AUTO: 0 % (ref 0–0.5)
INR PPP: 1.11 (ref 0.9–1.1)
LYMPHOCYTES # BLD AUTO: 1.12 10*3/MM3 (ref 0.7–3.1)
LYMPHOCYTES NFR BLD AUTO: 23.1 % (ref 19.6–45.3)
MCH RBC QN AUTO: 31.8 PG (ref 26.6–33)
MCHC RBC AUTO-ENTMCNC: 32.5 G/DL (ref 31.5–35.7)
MCV RBC AUTO: 98 FL (ref 79–97)
MONOCYTES # BLD AUTO: 0.5 10*3/MM3 (ref 0.1–0.9)
MONOCYTES NFR BLD AUTO: 10.3 % (ref 5–12)
NEUTROPHILS NFR BLD AUTO: 3.06 10*3/MM3 (ref 1.7–7)
NEUTROPHILS NFR BLD AUTO: 63.1 % (ref 42.7–76)
NRBC BLD AUTO-RTO: 0 /100 WBC (ref 0–0.2)
PLATELET # BLD AUTO: 209 10*3/MM3 (ref 140–450)
PMV BLD AUTO: 10.2 FL (ref 6–12)
POTASSIUM SERPL-SCNC: 4.4 MMOL/L (ref 3.5–5.2)
PROT SERPL-MCNC: 5.7 G/DL (ref 6–8.5)
PROTHROMBIN TIME: 14.5 SECONDS (ref 11.7–14.2)
QT INTERVAL: 390 MS
RBC # BLD AUTO: 3.58 10*6/MM3 (ref 4.14–5.8)
RH BLD: POSITIVE
SODIUM SERPL-SCNC: 140 MMOL/L (ref 136–145)
T&S EXPIRATION DATE: NORMAL
WBC NRBC COR # BLD: 4.85 10*3/MM3 (ref 3.4–10.8)

## 2023-02-24 PROCEDURE — 86901 BLOOD TYPING SEROLOGIC RH(D): CPT

## 2023-02-24 PROCEDURE — 86850 RBC ANTIBODY SCREEN: CPT

## 2023-02-24 PROCEDURE — 85730 THROMBOPLASTIN TIME PARTIAL: CPT

## 2023-02-24 PROCEDURE — 93010 ELECTROCARDIOGRAM REPORT: CPT | Performed by: INTERNAL MEDICINE

## 2023-02-24 PROCEDURE — 36415 COLL VENOUS BLD VENIPUNCTURE: CPT

## 2023-02-24 PROCEDURE — 72192 CT PELVIS W/O DYE: CPT

## 2023-02-24 PROCEDURE — 85025 COMPLETE CBC W/AUTO DIFF WBC: CPT

## 2023-02-24 PROCEDURE — 93005 ELECTROCARDIOGRAM TRACING: CPT

## 2023-02-24 PROCEDURE — 85610 PROTHROMBIN TIME: CPT

## 2023-02-24 PROCEDURE — 72131 CT LUMBAR SPINE W/O DYE: CPT

## 2023-02-24 PROCEDURE — 80053 COMPREHEN METABOLIC PANEL: CPT

## 2023-02-24 PROCEDURE — 86900 BLOOD TYPING SEROLOGIC ABO: CPT

## 2023-02-24 RX ORDER — FINASTERIDE 5 MG/1
5 TABLET, FILM COATED ORAL DAILY
COMMUNITY

## 2023-02-24 NOTE — DISCHARGE INSTRUCTIONS
Take the following medications the morning of surgery:  GABAPENTIN    If you are on prescription narcotic pain medication to control your pain you may also take that medication the morning of surgery.    General Instructions:  Do not eat solid food after midnight the night before surgery.  You may drink clear liquids day of surgery but must stop at least one hour before your hospital arrival time.  4:15 AM  It is beneficial for you to have a clear drink that contains carbohydrates the day of surgery.  We suggest a 12 to 20 ounce bottle of Gatorade or Powerade for non-diabetic patients or a 12 to 20 ounce bottle of G2 or Powerade Zero for diabetic patients. (Pediatric patients, are not advised to drink a 12 to 20 ounce carbohydrate drink)    Clear liquids are liquids you can see through.  Nothing red in color.     Plain water                               Sports drinks  Sodas                                   Gelatin (Jell-O)  Fruit juices without pulp such as white grape juice and apple juice  Popsicles that contain no fruit or yogurt  Tea or coffee (no cream or milk added)  Gatorade / Powerade  G2 / Powerade Zero    Infants may have breast milk up to four hours before surgery.  Infants drinking formula may drink formula up to six hours before surgery.   Patients who avoid smoking, chewing tobacco and alcohol for 4 weeks prior to surgery have a reduced risk of post-operative complications.  Quit smoking as many days before surgery as you can.  Do not smoke, use chewing tobacco or drink alcohol the day of surgery.   If applicable bring your C-PAP/ BI-PAP machine.  Bring any papers given to you in the doctor’s office.  Wear clean comfortable clothes.  Do not wear contact lenses, false eyelashes or make-up.  Bring a case for your glasses.   Bring crutches or walker if applicable.  Remove all piercings.  Leave jewelry and any other valuables at home.  Hair extensions with metal clips must be removed prior to  surgery.  The Pre-Admission Testing nurse will instruct you to bring medications if unable to obtain an accurate list in Pre-Admission Testing.        If you were given a blood bank ID arm band remember to bring it with you the day of surgery.    Preventing a Surgical Site Infection:  For 2 to 3 days before surgery, avoid shaving with a razor because the razor can irritate skin and make it easier to develop an infection.    Any areas of open skin can increase the risk of a post-operative wound infection by allowing bacteria to enter and travel throughout the body.  Notify your surgeon if you have any skin wounds / rashes even if it is not near the expected surgical site.  The area will need assessed to determine if surgery should be delayed until it is healed.  The night prior to surgery shower using a fresh bar of anti-bacterial soap (such as Dial) and clean washcloth.  Sleep in a clean bed with clean clothing.  Do not allow pets to sleep with you.  Shower on the morning of surgery using a fresh bar of anti-bacterial soap (such as Dial) and clean washcloth.  Dry with a clean towel and dress in clean clothing.  Ask your surgeon if you will be receiving antibiotics prior to surgery.  Make sure you, your family, and all healthcare providers clean their hands with soap and water or an alcohol based hand  before caring for you or your wound.    Day of surgery:2/27/2023 ARRIVAL TIME 5:15 AM  Your arrival time is approximately two hours before your scheduled surgery time.  Upon arrival, a Pre-op nurse and Anesthesiologist will review your health history, obtain vital signs, and answer questions you may have.  The only belongings needed at this time will be a list of your home medications and if applicable your C-PAP/BI-PAP machine.  A Pre-op nurse will start an IV and you may receive medication in preparation for surgery, including something to help you relax.     Please be aware that surgery does come with  discomfort.  We want to make every effort to control your discomfort so please discuss any uncontrolled symptoms with your nurse.   Your doctor will most likely have prescribed pain medications.      If you are going home after surgery you will receive individualized written care instructions before being discharged.  A responsible adult must drive you to and from the hospital on the day of your surgery and stay with you for 24 hours.  Discharge prescriptions can be filled by the hospital pharmacy during regular pharmacy hours.  If you are having surgery late in the day/evening your prescription may be e-prescribed to your pharmacy.  Please verify your pharmacy hours or chose a 24 hour pharmacy to avoid not having access to your prescription because your pharmacy has closed for the day.    If you are staying overnight following surgery, you will be transported to your hospital room following the recovery period.  [facility] has all private rooms.    If you have any questions please call Pre-Admission Testing at [phone].  Deductibles and co-payments are collected on the day of service. Please be prepared to pay the required co-pay, deductible or deposit on the day of service as defined by your plan.    Call your surgeon immediately if you experience any of the following symptoms:  Sore Throat  Shortness of Breath or difficulty breathing  Cough  Chills  Body soreness or muscle pain  Headache  Fever  New loss of taste or smell  Do not arrive for your surgery ill.  Your procedure will need to be rescheduled to another time.  You will need to call your physician before the day of surgery to avoid any unnecessary exposure to hospital staff as well as other patients.   CHLORHEXIDINE CLOTH INSTRUCTIONS  The morning of surgery follow these instructions using the Chlorhexidine cloths you've been given.  These steps reduce bacteria on the body.  Do not use the cloths near your eyes, ears mouth, genitalia  or on open wounds.  Throw the cloths away after use but do not try to flush them down a toilet.      Open and remove one cloth at a time from the package.    Leave the cloth unfolded and begin the bathing.  Massage the skin with the cloths using gentle pressure to remove bacteria.  Do not scrub harshly.   Follow the steps below with one 2% CHG cloth per area (6 total cloths).  One cloth for neck, shoulders and chest.  One cloth for both arms, hands, fingers and underarms (do underarms last).  One cloth for the abdomen followed by groin.  One cloth for right leg and foot including between the toes.  One cloth for left leg and foot including between the toes.  The last cloth is to be used for the back of the neck, back and buttocks.    Allow the CHG to air dry 3 minutes on the skin which will give it time to work and decrease the chance of irritation.  The skin may feel sticky until it is dry.  Do not rinse with water or any other liquid or you will lose the beneficial effects of the CHG.  If mild skin irritation occurs, do rinse the skin to remove the CHG.  Report this to the nurse at time of admission.  Do not apply lotions, creams, ointments, deodorants or perfumes after using the clothes. Dress in clean clothes before coming to the hospital.

## 2023-02-27 ENCOUNTER — APPOINTMENT (OUTPATIENT)
Dept: GENERAL RADIOLOGY | Facility: HOSPITAL | Age: 78
DRG: 454 | End: 2023-02-27
Payer: OTHER GOVERNMENT

## 2023-02-27 ENCOUNTER — ANESTHESIA EVENT (OUTPATIENT)
Dept: PERIOP | Facility: HOSPITAL | Age: 78
DRG: 454 | End: 2023-02-27
Payer: OTHER GOVERNMENT

## 2023-02-27 ENCOUNTER — HOSPITAL ENCOUNTER (INPATIENT)
Facility: HOSPITAL | Age: 78
LOS: 2 days | Discharge: HOME HEALTH | DRG: 454 | End: 2023-03-05
Attending: NEUROLOGICAL SURGERY | Admitting: NEUROLOGICAL SURGERY
Payer: OTHER GOVERNMENT

## 2023-02-27 ENCOUNTER — ANESTHESIA (OUTPATIENT)
Dept: PERIOP | Facility: HOSPITAL | Age: 78
DRG: 454 | End: 2023-02-27
Payer: OTHER GOVERNMENT

## 2023-02-27 DIAGNOSIS — M54.16 LUMBAR RADICULOPATHY: ICD-10-CM

## 2023-02-27 DIAGNOSIS — M71.38 SYNOVIAL CYST OF LUMBAR FACET JOINT: ICD-10-CM

## 2023-02-27 LAB
GLUCOSE BLDC GLUCOMTR-MCNC: 140 MG/DL (ref 70–130)
GLUCOSE BLDC GLUCOMTR-MCNC: 174 MG/DL (ref 70–130)

## 2023-02-27 PROCEDURE — 0T9B80Z DRAINAGE OF BLADDER WITH DRAINAGE DEVICE, VIA NATURAL OR ARTIFICIAL OPENING ENDOSCOPIC: ICD-10-PCS | Performed by: UROLOGY

## 2023-02-27 PROCEDURE — C1713 ANCHOR/SCREW BN/BN,TIS/BN: HCPCS | Performed by: NEUROLOGICAL SURGERY

## 2023-02-27 PROCEDURE — 0T7D8ZZ DILATION OF URETHRA, VIA NATURAL OR ARTIFICIAL OPENING ENDOSCOPIC: ICD-10-PCS | Performed by: UROLOGY

## 2023-02-27 PROCEDURE — 25010000002 ONDANSETRON PER 1 MG: Performed by: NURSE ANESTHETIST, CERTIFIED REGISTERED

## 2023-02-27 PROCEDURE — 25010000002 DEXAMETHASONE SODIUM PHOSPHATE 20 MG/5ML SOLUTION: Performed by: NURSE ANESTHETIST, CERTIFIED REGISTERED

## 2023-02-27 PROCEDURE — 0SH804Z INSERTION OF INTERNAL FIXATION DEVICE INTO LEFT SACROILIAC JOINT, OPEN APPROACH: ICD-10-PCS | Performed by: NEUROLOGICAL SURGERY

## 2023-02-27 PROCEDURE — 22842 INSERT SPINE FIXATION DEVICE: CPT | Performed by: NEUROLOGICAL SURGERY

## 2023-02-27 PROCEDURE — C2627 CATH, SUPRAPUBIC/CYSTOSCOPIC: HCPCS | Performed by: NEUROLOGICAL SURGERY

## 2023-02-27 PROCEDURE — 22634 ARTHRD CMBN 1NTRSPC EA ADDL: CPT | Performed by: NEUROLOGICAL SURGERY

## 2023-02-27 PROCEDURE — 22853 INSJ BIOMECHANICAL DEVICE: CPT | Performed by: NEUROLOGICAL SURGERY

## 2023-02-27 PROCEDURE — 25010000002 HEPARIN (PORCINE) PER 1000 UNITS: Performed by: NEUROLOGICAL SURGERY

## 2023-02-27 PROCEDURE — 25010000002 CEFAZOLIN IN DEXTROSE 2-4 GM/100ML-% SOLUTION: Performed by: NEUROLOGICAL SURGERY

## 2023-02-27 PROCEDURE — 82962 GLUCOSE BLOOD TEST: CPT

## 2023-02-27 PROCEDURE — 22848 INSERT PELV FIXATION DEVICE: CPT | Performed by: NEUROLOGICAL SURGERY

## 2023-02-27 PROCEDURE — 61783 SCAN PROC SPINAL: CPT | Performed by: NEUROLOGICAL SURGERY

## 2023-02-27 PROCEDURE — C1769 GUIDE WIRE: HCPCS | Performed by: NEUROLOGICAL SURGERY

## 2023-02-27 PROCEDURE — 8E0W0CZ ROBOTIC ASSISTED PROCEDURE OF TRUNK REGION, OPEN APPROACH: ICD-10-PCS | Performed by: NEUROLOGICAL SURGERY

## 2023-02-27 PROCEDURE — 0SB20ZZ EXCISION OF LUMBAR VERTEBRAL DISC, OPEN APPROACH: ICD-10-PCS | Performed by: NEUROLOGICAL SURGERY

## 2023-02-27 PROCEDURE — 0SG1071 FUSION OF 2 OR MORE LUMBAR VERTEBRAL JOINTS WITH AUTOLOGOUS TISSUE SUBSTITUTE, POSTERIOR APPROACH, POSTERIOR COLUMN, OPEN APPROACH: ICD-10-PCS | Performed by: NEUROLOGICAL SURGERY

## 2023-02-27 PROCEDURE — 63052 LAM FACETC/FRMT ARTHRD LUM 1: CPT | Performed by: NEUROLOGICAL SURGERY

## 2023-02-27 PROCEDURE — 25010000002 FENTANYL CITRATE (PF) 50 MCG/ML SOLUTION: Performed by: NURSE ANESTHETIST, CERTIFIED REGISTERED

## 2023-02-27 PROCEDURE — 22633 ARTHRD CMBN 1NTRSPC LUMBAR: CPT | Performed by: NEUROLOGICAL SURGERY

## 2023-02-27 PROCEDURE — 00NY0ZZ RELEASE LUMBAR SPINAL CORD, OPEN APPROACH: ICD-10-PCS | Performed by: NEUROLOGICAL SURGERY

## 2023-02-27 PROCEDURE — 25010000002 HYDROMORPHONE PER 4 MG: Performed by: NURSE ANESTHETIST, CERTIFIED REGISTERED

## 2023-02-27 PROCEDURE — 0SG10AJ FUSION OF 2 OR MORE LUMBAR VERTEBRAL JOINTS WITH INTERBODY FUSION DEVICE, POSTERIOR APPROACH, ANTERIOR COLUMN, OPEN APPROACH: ICD-10-PCS | Performed by: NEUROLOGICAL SURGERY

## 2023-02-27 PROCEDURE — S0260 H&P FOR SURGERY: HCPCS | Performed by: NEUROLOGICAL SURGERY

## 2023-02-27 PROCEDURE — 76000 FLUOROSCOPY <1 HR PHYS/QHP: CPT

## 2023-02-27 PROCEDURE — 01NB0ZZ RELEASE LUMBAR NERVE, OPEN APPROACH: ICD-10-PCS | Performed by: NEUROLOGICAL SURGERY

## 2023-02-27 PROCEDURE — 72100 X-RAY EXAM L-S SPINE 2/3 VWS: CPT

## 2023-02-27 PROCEDURE — 25010000002 PROPOFOL 10 MG/ML EMULSION: Performed by: NURSE ANESTHETIST, CERTIFIED REGISTERED

## 2023-02-27 PROCEDURE — 0SH704Z INSERTION OF INTERNAL FIXATION DEVICE INTO RIGHT SACROILIAC JOINT, OPEN APPROACH: ICD-10-PCS | Performed by: NEUROLOGICAL SURGERY

## 2023-02-27 PROCEDURE — 63053 LAM FACTC/FRMT ARTHRD LUM EA: CPT | Performed by: NEUROLOGICAL SURGERY

## 2023-02-27 DEVICE — ALLOGRFT DBM GRAFTON DS INJ FIBR 6CC: Type: IMPLANTABLE DEVICE | Site: SPINE LUMBAR | Status: FUNCTIONAL

## 2023-02-27 DEVICE — SPACER 6069076 CATALYFT PL40 LONG 7MM
Type: IMPLANTABLE DEVICE | Site: SPINE LUMBAR | Status: FUNCTIONAL
Brand: CATALYFT PL EXPANDABLE INTERBODY SYSTEM

## 2023-02-27 DEVICE — ROD 1475501100 4.75 CCM NS CURV 100MM
Type: IMPLANTABLE DEVICE | Site: SPINE LUMBAR | Status: FUNCTIONAL
Brand: CD HORIZON® SPINAL SYSTEM

## 2023-02-27 DEVICE — BONE WAX
Type: IMPLANTABLE DEVICE | Site: SPINE LUMBAR | Status: FUNCTIONAL
Brand: ETHICON

## 2023-02-27 DEVICE — FLOSEAL HEMOSTATIC MATRIX, 10ML
Type: IMPLANTABLE DEVICE | Site: SPINE LUMBAR | Status: FUNCTIONAL
Brand: FLOSEAL HEMOSTATIC MATRIX

## 2023-02-27 DEVICE — GRFT BONE MAGNIFUSE PC 1X10CM: Type: IMPLANTABLE DEVICE | Site: SPINE LUMBAR | Status: FUNCTIONAL

## 2023-02-27 RX ORDER — ACETAMINOPHEN 325 MG/1
650 TABLET ORAL
Status: DISCONTINUED | OUTPATIENT
Start: 2023-02-27 | End: 2023-03-02

## 2023-02-27 RX ORDER — HYDROMORPHONE HYDROCHLORIDE 1 MG/ML
0.25 INJECTION, SOLUTION INTRAMUSCULAR; INTRAVENOUS; SUBCUTANEOUS
Status: DISCONTINUED | OUTPATIENT
Start: 2023-02-27 | End: 2023-03-05 | Stop reason: HOSPADM

## 2023-02-27 RX ORDER — PANTOPRAZOLE SODIUM 40 MG/1
40 TABLET, DELAYED RELEASE ORAL
Status: DISCONTINUED | OUTPATIENT
Start: 2023-02-28 | End: 2023-03-05 | Stop reason: HOSPADM

## 2023-02-27 RX ORDER — NALOXONE HCL 0.4 MG/ML
0.4 VIAL (ML) INJECTION
Status: DISCONTINUED | OUTPATIENT
Start: 2023-02-27 | End: 2023-03-05 | Stop reason: HOSPADM

## 2023-02-27 RX ORDER — AMOXICILLIN 250 MG
1 CAPSULE ORAL 2 TIMES DAILY
Status: DISCONTINUED | OUTPATIENT
Start: 2023-02-27 | End: 2023-03-05 | Stop reason: HOSPADM

## 2023-02-27 RX ORDER — FINASTERIDE 5 MG/1
5 TABLET, FILM COATED ORAL DAILY
Status: DISCONTINUED | OUTPATIENT
Start: 2023-02-27 | End: 2023-03-05 | Stop reason: HOSPADM

## 2023-02-27 RX ORDER — DEXAMETHASONE SODIUM PHOSPHATE 4 MG/ML
INJECTION, SOLUTION INTRA-ARTICULAR; INTRALESIONAL; INTRAMUSCULAR; INTRAVENOUS; SOFT TISSUE AS NEEDED
Status: DISCONTINUED | OUTPATIENT
Start: 2023-02-27 | End: 2023-02-27 | Stop reason: SURG

## 2023-02-27 RX ORDER — FLUMAZENIL 0.1 MG/ML
0.2 INJECTION INTRAVENOUS AS NEEDED
Status: DISCONTINUED | OUTPATIENT
Start: 2023-02-27 | End: 2023-02-27 | Stop reason: HOSPADM

## 2023-02-27 RX ORDER — DROPERIDOL 2.5 MG/ML
0.62 INJECTION, SOLUTION INTRAMUSCULAR; INTRAVENOUS
Status: DISCONTINUED | OUTPATIENT
Start: 2023-02-27 | End: 2023-02-27 | Stop reason: HOSPADM

## 2023-02-27 RX ORDER — FAMOTIDINE 10 MG/ML
20 INJECTION, SOLUTION INTRAVENOUS ONCE
Status: COMPLETED | OUTPATIENT
Start: 2023-02-27 | End: 2023-02-27

## 2023-02-27 RX ORDER — HYDROCODONE BITARTRATE AND ACETAMINOPHEN 7.5; 325 MG/1; MG/1
1 TABLET ORAL ONCE AS NEEDED
Status: DISCONTINUED | OUTPATIENT
Start: 2023-02-27 | End: 2023-02-27 | Stop reason: HOSPADM

## 2023-02-27 RX ORDER — NALOXONE HCL 0.4 MG/ML
0.2 VIAL (ML) INJECTION AS NEEDED
Status: DISCONTINUED | OUTPATIENT
Start: 2023-02-27 | End: 2023-02-27 | Stop reason: HOSPADM

## 2023-02-27 RX ORDER — OXYCODONE AND ACETAMINOPHEN 7.5; 325 MG/1; MG/1
1 TABLET ORAL EVERY 4 HOURS PRN
Status: DISCONTINUED | OUTPATIENT
Start: 2023-02-27 | End: 2023-02-27 | Stop reason: HOSPADM

## 2023-02-27 RX ORDER — BUPIVACAINE HYDROCHLORIDE AND EPINEPHRINE 5; 5 MG/ML; UG/ML
INJECTION, SOLUTION EPIDURAL; INTRACAUDAL; PERINEURAL AS NEEDED
Status: DISCONTINUED | OUTPATIENT
Start: 2023-02-27 | End: 2023-02-27 | Stop reason: HOSPADM

## 2023-02-27 RX ORDER — CEFAZOLIN SODIUM 2 G/100ML
2 INJECTION, SOLUTION INTRAVENOUS ONCE
Status: COMPLETED | OUTPATIENT
Start: 2023-02-27 | End: 2023-02-27

## 2023-02-27 RX ORDER — ONDANSETRON 4 MG/1
4 TABLET, FILM COATED ORAL EVERY 6 HOURS PRN
Status: DISCONTINUED | OUTPATIENT
Start: 2023-02-27 | End: 2023-03-05 | Stop reason: HOSPADM

## 2023-02-27 RX ORDER — DIPHENHYDRAMINE HYDROCHLORIDE 50 MG/ML
12.5 INJECTION INTRAMUSCULAR; INTRAVENOUS
Status: DISCONTINUED | OUTPATIENT
Start: 2023-02-27 | End: 2023-02-27 | Stop reason: HOSPADM

## 2023-02-27 RX ORDER — SODIUM CHLORIDE 0.9 % (FLUSH) 0.9 %
3-10 SYRINGE (ML) INJECTION AS NEEDED
Status: DISCONTINUED | OUTPATIENT
Start: 2023-02-27 | End: 2023-02-27 | Stop reason: HOSPADM

## 2023-02-27 RX ORDER — LISINOPRIL 20 MG/1
10 TABLET ORAL EVERY MORNING
Status: DISCONTINUED | OUTPATIENT
Start: 2023-02-28 | End: 2023-03-01

## 2023-02-27 RX ORDER — ATORVASTATIN CALCIUM 20 MG/1
10 TABLET, FILM COATED ORAL DAILY
Status: DISCONTINUED | OUTPATIENT
Start: 2023-02-27 | End: 2023-03-05 | Stop reason: HOSPADM

## 2023-02-27 RX ORDER — SODIUM CHLORIDE 0.9 % (FLUSH) 0.9 %
10 SYRINGE (ML) INJECTION EVERY 12 HOURS SCHEDULED
Status: DISCONTINUED | OUTPATIENT
Start: 2023-02-27 | End: 2023-03-05 | Stop reason: HOSPADM

## 2023-02-27 RX ORDER — KETOCONAZOLE 20 MG/ML
SHAMPOO TOPICAL 2 TIMES WEEKLY
COMMUNITY

## 2023-02-27 RX ORDER — OXYCODONE HYDROCHLORIDE 5 MG/1
10 TABLET ORAL EVERY 4 HOURS PRN
Status: DISCONTINUED | OUTPATIENT
Start: 2023-02-27 | End: 2023-03-02

## 2023-02-27 RX ORDER — ROCURONIUM BROMIDE 10 MG/ML
INJECTION, SOLUTION INTRAVENOUS AS NEEDED
Status: DISCONTINUED | OUTPATIENT
Start: 2023-02-27 | End: 2023-02-27 | Stop reason: SURG

## 2023-02-27 RX ORDER — ONDANSETRON 2 MG/ML
4 INJECTION INTRAMUSCULAR; INTRAVENOUS ONCE AS NEEDED
Status: DISCONTINUED | OUTPATIENT
Start: 2023-02-27 | End: 2023-02-27 | Stop reason: HOSPADM

## 2023-02-27 RX ORDER — SODIUM CHLORIDE 0.9 % (FLUSH) 0.9 %
10 SYRINGE (ML) INJECTION AS NEEDED
Status: DISCONTINUED | OUTPATIENT
Start: 2023-02-27 | End: 2023-03-05 | Stop reason: HOSPADM

## 2023-02-27 RX ORDER — METHOCARBAMOL 500 MG/1
500 TABLET, FILM COATED ORAL 4 TIMES DAILY
Status: ON HOLD | COMMUNITY
End: 2023-03-05 | Stop reason: SDUPTHER

## 2023-02-27 RX ORDER — PROMETHAZINE HYDROCHLORIDE 25 MG/1
25 SUPPOSITORY RECTAL ONCE AS NEEDED
Status: DISCONTINUED | OUTPATIENT
Start: 2023-02-27 | End: 2023-02-27 | Stop reason: HOSPADM

## 2023-02-27 RX ORDER — LIDOCAINE HYDROCHLORIDE 20 MG/ML
INJECTION, SOLUTION INFILTRATION; PERINEURAL AS NEEDED
Status: DISCONTINUED | OUTPATIENT
Start: 2023-02-27 | End: 2023-02-27 | Stop reason: SURG

## 2023-02-27 RX ORDER — SODIUM CHLORIDE 9 MG/ML
40 INJECTION, SOLUTION INTRAVENOUS AS NEEDED
Status: DISCONTINUED | OUTPATIENT
Start: 2023-02-27 | End: 2023-03-05 | Stop reason: HOSPADM

## 2023-02-27 RX ORDER — SODIUM CHLORIDE, SODIUM LACTATE, POTASSIUM CHLORIDE, CALCIUM CHLORIDE 600; 310; 30; 20 MG/100ML; MG/100ML; MG/100ML; MG/100ML
9 INJECTION, SOLUTION INTRAVENOUS CONTINUOUS
Status: DISCONTINUED | OUTPATIENT
Start: 2023-02-27 | End: 2023-03-02

## 2023-02-27 RX ORDER — MAGNESIUM HYDROXIDE 1200 MG/15ML
LIQUID ORAL AS NEEDED
Status: DISCONTINUED | OUTPATIENT
Start: 2023-02-27 | End: 2023-02-27 | Stop reason: HOSPADM

## 2023-02-27 RX ORDER — FENTANYL CITRATE 50 UG/ML
INJECTION, SOLUTION INTRAMUSCULAR; INTRAVENOUS AS NEEDED
Status: DISCONTINUED | OUTPATIENT
Start: 2023-02-27 | End: 2023-02-27 | Stop reason: SURG

## 2023-02-27 RX ORDER — ONDANSETRON 2 MG/ML
4 INJECTION INTRAMUSCULAR; INTRAVENOUS EVERY 6 HOURS PRN
Status: DISCONTINUED | OUTPATIENT
Start: 2023-02-27 | End: 2023-03-05 | Stop reason: HOSPADM

## 2023-02-27 RX ORDER — GABAPENTIN 400 MG/1
800 CAPSULE ORAL 3 TIMES DAILY
Status: DISCONTINUED | OUTPATIENT
Start: 2023-02-27 | End: 2023-03-05 | Stop reason: HOSPADM

## 2023-02-27 RX ORDER — GLIPIZIDE 5 MG/1
5 TABLET ORAL
COMMUNITY

## 2023-02-27 RX ORDER — FERROUS SULFATE 325(65) MG
325 TABLET ORAL 2 TIMES DAILY
Status: DISCONTINUED | OUTPATIENT
Start: 2023-02-27 | End: 2023-03-05 | Stop reason: HOSPADM

## 2023-02-27 RX ORDER — EPHEDRINE SULFATE 50 MG/ML
INJECTION INTRAVENOUS AS NEEDED
Status: DISCONTINUED | OUTPATIENT
Start: 2023-02-27 | End: 2023-02-27 | Stop reason: SURG

## 2023-02-27 RX ORDER — ONDANSETRON 2 MG/ML
INJECTION INTRAMUSCULAR; INTRAVENOUS AS NEEDED
Status: DISCONTINUED | OUTPATIENT
Start: 2023-02-27 | End: 2023-02-27 | Stop reason: SURG

## 2023-02-27 RX ORDER — TRANEXAMIC ACID 100 MG/ML
INJECTION, SOLUTION INTRAVENOUS AS NEEDED
Status: DISCONTINUED | OUTPATIENT
Start: 2023-02-27 | End: 2023-02-27 | Stop reason: SURG

## 2023-02-27 RX ORDER — LABETALOL HYDROCHLORIDE 5 MG/ML
5 INJECTION, SOLUTION INTRAVENOUS
Status: DISCONTINUED | OUTPATIENT
Start: 2023-02-27 | End: 2023-02-27 | Stop reason: HOSPADM

## 2023-02-27 RX ORDER — CETIRIZINE HYDROCHLORIDE 10 MG/1
10 TABLET ORAL DAILY PRN
Status: DISCONTINUED | OUTPATIENT
Start: 2023-02-27 | End: 2023-03-05 | Stop reason: HOSPADM

## 2023-02-27 RX ORDER — GLIPIZIDE 5 MG/1
5 TABLET ORAL
Status: DISCONTINUED | OUTPATIENT
Start: 2023-02-27 | End: 2023-03-05 | Stop reason: HOSPADM

## 2023-02-27 RX ORDER — CEFAZOLIN SODIUM 2 G/100ML
2 INJECTION, SOLUTION INTRAVENOUS EVERY 8 HOURS
Status: COMPLETED | OUTPATIENT
Start: 2023-02-27 | End: 2023-02-28

## 2023-02-27 RX ORDER — FENTANYL CITRATE 50 UG/ML
50 INJECTION, SOLUTION INTRAMUSCULAR; INTRAVENOUS
Status: DISCONTINUED | OUTPATIENT
Start: 2023-02-27 | End: 2023-02-27 | Stop reason: HOSPADM

## 2023-02-27 RX ORDER — HYDROMORPHONE HYDROCHLORIDE 1 MG/ML
0.5 INJECTION, SOLUTION INTRAMUSCULAR; INTRAVENOUS; SUBCUTANEOUS
Status: DISCONTINUED | OUTPATIENT
Start: 2023-02-27 | End: 2023-02-27 | Stop reason: HOSPADM

## 2023-02-27 RX ORDER — KETAMINE HCL IN NACL, ISO-OSM 100MG/10ML
SYRINGE (ML) INJECTION AS NEEDED
Status: DISCONTINUED | OUTPATIENT
Start: 2023-02-27 | End: 2023-02-27 | Stop reason: SURG

## 2023-02-27 RX ORDER — FUROSEMIDE 40 MG/1
20 TABLET ORAL EVERY MORNING
Status: DISCONTINUED | OUTPATIENT
Start: 2023-02-28 | End: 2023-03-01

## 2023-02-27 RX ORDER — KETOCONAZOLE 20 MG/G
1 CREAM TOPICAL DAILY
COMMUNITY

## 2023-02-27 RX ORDER — HYDROCODONE BITARTRATE AND ACETAMINOPHEN 5; 325 MG/1; MG/1
1 TABLET ORAL EVERY 6 HOURS PRN
Status: ON HOLD | COMMUNITY
End: 2023-02-27

## 2023-02-27 RX ORDER — EPHEDRINE SULFATE 50 MG/ML
5 INJECTION, SOLUTION INTRAVENOUS ONCE AS NEEDED
Status: DISCONTINUED | OUTPATIENT
Start: 2023-02-27 | End: 2023-02-27 | Stop reason: HOSPADM

## 2023-02-27 RX ORDER — ENOXAPARIN SODIUM 100 MG/ML
40 INJECTION SUBCUTANEOUS DAILY
Status: DISCONTINUED | OUTPATIENT
Start: 2023-02-28 | End: 2023-03-05 | Stop reason: HOSPADM

## 2023-02-27 RX ORDER — CHOLECALCIFEROL (VITAMIN D3) 125 MCG
500 CAPSULE ORAL EVERY OTHER DAY
Status: DISCONTINUED | OUTPATIENT
Start: 2023-02-27 | End: 2023-03-05 | Stop reason: HOSPADM

## 2023-02-27 RX ORDER — METHOCARBAMOL 500 MG/1
500 TABLET, FILM COATED ORAL 4 TIMES DAILY
Status: DISCONTINUED | OUTPATIENT
Start: 2023-02-27 | End: 2023-03-05 | Stop reason: HOSPADM

## 2023-02-27 RX ORDER — HYDRALAZINE HYDROCHLORIDE 20 MG/ML
5 INJECTION INTRAMUSCULAR; INTRAVENOUS
Status: DISCONTINUED | OUTPATIENT
Start: 2023-02-27 | End: 2023-02-27 | Stop reason: HOSPADM

## 2023-02-27 RX ORDER — LIDOCAINE HYDROCHLORIDE 10 MG/ML
0.5 INJECTION, SOLUTION EPIDURAL; INFILTRATION; INTRACAUDAL; PERINEURAL ONCE AS NEEDED
Status: DISCONTINUED | OUTPATIENT
Start: 2023-02-27 | End: 2023-02-27 | Stop reason: HOSPADM

## 2023-02-27 RX ORDER — PROPOFOL 10 MG/ML
VIAL (ML) INTRAVENOUS AS NEEDED
Status: DISCONTINUED | OUTPATIENT
Start: 2023-02-27 | End: 2023-02-27 | Stop reason: SURG

## 2023-02-27 RX ORDER — PROMETHAZINE HYDROCHLORIDE 25 MG/1
25 TABLET ORAL ONCE AS NEEDED
Status: DISCONTINUED | OUTPATIENT
Start: 2023-02-27 | End: 2023-02-27 | Stop reason: HOSPADM

## 2023-02-27 RX ORDER — SODIUM CHLORIDE 0.9 % (FLUSH) 0.9 %
3 SYRINGE (ML) INJECTION EVERY 12 HOURS SCHEDULED
Status: DISCONTINUED | OUTPATIENT
Start: 2023-02-27 | End: 2023-02-27 | Stop reason: HOSPADM

## 2023-02-27 RX ADMIN — PROPOFOL 150 MG: 10 INJECTION, EMULSION INTRAVENOUS at 06:59

## 2023-02-27 RX ADMIN — ATORVASTATIN CALCIUM 10 MG: 20 TABLET, FILM COATED ORAL at 16:26

## 2023-02-27 RX ADMIN — ROCURONIUM BROMIDE 20 MG: 10 INJECTION, SOLUTION INTRAVENOUS at 09:00

## 2023-02-27 RX ADMIN — FENTANYL CITRATE 50 MCG: 50 INJECTION, SOLUTION INTRAMUSCULAR; INTRAVENOUS at 08:29

## 2023-02-27 RX ADMIN — OXYCODONE 10 MG: 5 TABLET ORAL at 17:55

## 2023-02-27 RX ADMIN — DEXAMETHASONE SODIUM PHOSPHATE 8 MG: 4 INJECTION, SOLUTION INTRAMUSCULAR; INTRAVENOUS at 11:05

## 2023-02-27 RX ADMIN — CEFAZOLIN SODIUM 2 G: 2 INJECTION, SOLUTION INTRAVENOUS at 17:55

## 2023-02-27 RX ADMIN — GLIPIZIDE 5 MG: 5 TABLET ORAL at 16:30

## 2023-02-27 RX ADMIN — ROCURONIUM BROMIDE 50 MG: 10 INJECTION, SOLUTION INTRAVENOUS at 06:59

## 2023-02-27 RX ADMIN — Medication 500 MCG: at 16:27

## 2023-02-27 RX ADMIN — FINASTERIDE 5 MG: 5 TABLET, FILM COATED ORAL at 15:50

## 2023-02-27 RX ADMIN — Medication 10 ML: at 20:38

## 2023-02-27 RX ADMIN — FAMOTIDINE 20 MG: 10 INJECTION INTRAVENOUS at 06:28

## 2023-02-27 RX ADMIN — ONDANSETRON 4 MG: 2 INJECTION INTRAMUSCULAR; INTRAVENOUS at 11:05

## 2023-02-27 RX ADMIN — LIDOCAINE HYDROCHLORIDE 60 MG: 20 INJECTION, SOLUTION INFILTRATION; PERINEURAL at 06:59

## 2023-02-27 RX ADMIN — EPHEDRINE SULFATE 10 MG: 50 INJECTION INTRAVENOUS at 07:14

## 2023-02-27 RX ADMIN — ACETAMINOPHEN 650 MG: 325 TABLET, FILM COATED ORAL at 15:50

## 2023-02-27 RX ADMIN — ROCURONIUM BROMIDE 10 MG: 10 INJECTION, SOLUTION INTRAVENOUS at 10:54

## 2023-02-27 RX ADMIN — TRANEXAMIC ACID 1000 MG: 100 INJECTION INTRAVENOUS at 11:39

## 2023-02-27 RX ADMIN — ROCURONIUM BROMIDE 20 MG: 10 INJECTION, SOLUTION INTRAVENOUS at 07:47

## 2023-02-27 RX ADMIN — METHOCARBAMOL TABLETS 500 MG: 500 TABLET, COATED ORAL at 20:37

## 2023-02-27 RX ADMIN — FENTANYL CITRATE 50 MCG: 50 INJECTION, SOLUTION INTRAMUSCULAR; INTRAVENOUS at 13:09

## 2023-02-27 RX ADMIN — GABAPENTIN 800 MG: 400 CAPSULE ORAL at 20:37

## 2023-02-27 RX ADMIN — FENTANYL CITRATE 50 MCG: 50 INJECTION, SOLUTION INTRAMUSCULAR; INTRAVENOUS at 06:59

## 2023-02-27 RX ADMIN — CEFAZOLIN SODIUM 2 G: 2 INJECTION, SOLUTION INTRAVENOUS at 10:47

## 2023-02-27 RX ADMIN — ACETAMINOPHEN 650 MG: 325 TABLET, FILM COATED ORAL at 20:37

## 2023-02-27 RX ADMIN — SODIUM CHLORIDE, POTASSIUM CHLORIDE, SODIUM LACTATE AND CALCIUM CHLORIDE 9 ML/HR: 600; 310; 30; 20 INJECTION, SOLUTION INTRAVENOUS at 06:28

## 2023-02-27 RX ADMIN — SUGAMMADEX 200 MG: 100 INJECTION, SOLUTION INTRAVENOUS at 12:38

## 2023-02-27 RX ADMIN — FERROUS SULFATE TAB 325 MG (65 MG ELEMENTAL FE) 325 MG: 325 (65 FE) TAB at 20:37

## 2023-02-27 RX ADMIN — GABAPENTIN 800 MG: 400 CAPSULE ORAL at 15:50

## 2023-02-27 RX ADMIN — FERROUS SULFATE TAB 325 MG (65 MG ELEMENTAL FE) 325 MG: 325 (65 FE) TAB at 15:50

## 2023-02-27 RX ADMIN — EPHEDRINE SULFATE 5 MG: 50 INJECTION INTRAVENOUS at 09:10

## 2023-02-27 RX ADMIN — Medication 20 MG: at 11:18

## 2023-02-27 RX ADMIN — Medication 30 MG: at 09:42

## 2023-02-27 RX ADMIN — METHOCARBAMOL TABLETS 500 MG: 500 TABLET, COATED ORAL at 17:55

## 2023-02-27 RX ADMIN — CEFAZOLIN SODIUM 2 G: 2 INJECTION, SOLUTION INTRAVENOUS at 06:47

## 2023-02-27 RX ADMIN — DOCUSATE SODIUM 50MG AND SENNOSIDES 8.6MG 1 TABLET: 8.6; 5 TABLET, FILM COATED ORAL at 20:37

## 2023-02-27 RX ADMIN — HYDROMORPHONE HYDROCHLORIDE 0.5 MG: 1 INJECTION, SOLUTION INTRAMUSCULAR; INTRAVENOUS; SUBCUTANEOUS at 13:29

## 2023-02-27 RX ADMIN — SODIUM CHLORIDE, POTASSIUM CHLORIDE, SODIUM LACTATE AND CALCIUM CHLORIDE: 600; 310; 30; 20 INJECTION, SOLUTION INTRAVENOUS at 09:01

## 2023-02-27 NOTE — ANESTHESIA PROCEDURE NOTES
Airway  Urgency: elective    Date/Time: 2/27/2023 7:02 AM  Airway not difficult    General Information and Staff    Patient location during procedure: OR  CRNA/CAA: Mary Schwab CRNA    Indications and Patient Condition  Indications for airway management: airway protection    Preoxygenated: yes  Mask difficulty assessment: 1 - vent by mask    Final Airway Details  Final airway type: endotracheal airway      Successful airway: ETT  Cuffed: yes   Successful intubation technique: direct laryngoscopy  Endotracheal tube insertion site: oral  Blade: Walter  Blade size: 4  ETT size (mm): 8.0  Cormack-Lehane Classification: grade I - full view of glottis  Placement verified by: chest auscultation and capnometry   Cuff volume (mL): 6  Measured from: lips  ETT/EBT  to lips (cm): 21  Number of attempts at approach: 1  Assessment: lips, teeth, and gum same as pre-op and atraumatic intubation    Additional Comments  Smooth IV induction. Trachea intubated. Cuff up. Dentition intact without injury.

## 2023-02-27 NOTE — H&P
Jermaine continues to have right-sided leg pain and back pain with radiculopathy.  We discussed the risks of surgery including injury to nerve roots, leak of spinal fluid, need for further surgery down the line.  He understands and is willing to proceed with surgery.    Indio Blas MD  02/27/23  06:41 EST      From the previous record:    Patient ID: Jermaine Denis is a 77 y.o. male is an established patient with       Chief Complaint   Patient presents with   • Follow-up   • Back Pain   • Leg Pain   • Neck Pain   • Numbness   • Tingling   • Headache         Today, Jermaine Denis reports lower back and neck pain.  He reports stiffness of neck radiates bilateral shoulders. He reports radiating pain, numbness and weakness in left leg. He reports headaches, dizziness and  light headedness intermittent.  He reports left buttock burns and aches.  Denies loss of bowel/bladder incontinence. He reports  steroid injection in lower back moderate relief.  He reports no injection in neck.     Subjective:     History of Present Illness  Jermaine has stiffness of his neck in the morning with no radiculopathy down his arms.  He also complains of right-sided L4 and L5 radiculopathy and some difficulty ambulating due to this.  Has been using a cane for several months now.  He is up epidural steroid injections which give him some relief temporarily but not for a significant amount of time.        Review of Systems   Constitutional: Positive for activity change.   Respiratory: Negative for chest tightness and shortness of breath.    Musculoskeletal: Positive for back pain, neck pain and neck stiffness.   Neurological: Positive for dizziness, weakness and light-headedness.   Psychiatric/Behavioral: Positive for sleep disturbance.         While in the room and during my examination of the patient I wore a mask and eye protection.  I washed my hands before and after this patient encounter.  The patient was also wearing a mask.     The following  portions of the patient's history were reviewed and updated as appropriate: allergies, current medications, past family history, past medical history, past social history, past surgical history and problem list.     Objective:         Vitals:     01/27/23 1031   BP: 140/78   Pulse: 76   SpO2: 97%      There is no height or weight on file to calculate BMI.     Physical Exam  Constitutional:       Appearance: Normal appearance.   HENT:      Head: Normocephalic and atraumatic.   Eyes:      Extraocular Movements: Extraocular movements intact.      Conjunctiva/sclera: Conjunctivae normal.      Pupils: Pupils are equal, round, and reactive to light.   Cardiovascular:      Rate and Rhythm: Normal rate and regular rhythm.      Pulses: Normal pulses.   Pulmonary:      Breath sounds: Normal breath sounds.   Abdominal:      Palpations: Abdomen is soft.   Musculoskeletal:         General: Normal range of motion.      Cervical back: Normal range of motion and neck supple.   Skin:     General: Skin is warm and dry.   Neurological:      Mental Status: He is alert and oriented to person, place, and time.      Cranial Nerves: Cranial nerves 2-12 are intact.      Motor: Motor function is intact. No weakness or atrophy.      Coordination: Coordination is intact. Romberg sign negative. Romberg Test normal.      Gait: Gait is intact. Gait normal.      Deep Tendon Reflexes: Reflexes are normal and symmetric.      Reflex Scores:       Tricep reflexes are 2+ on the right side and 2+ on the left side.       Bicep reflexes are 2+ on the right side and 2+ on the left side.       Brachioradialis reflexes are 2+ on the right side and 2+ on the left side.       Patellar reflexes are 2+ on the right side and 2+ on the left side.       Achilles reflexes are 2+ on the right side and 2+ on the left side.  Psychiatric:         Speech: Speech normal.         Neurologic Exam      Mental Status   Oriented to person, place, and time.   Attention: normal.  Concentration: normal.   Speech: speech is normal   Level of consciousness: alert     Cranial Nerves   Cranial nerves II through XII intact.      CN III, IV, VI   Pupils are equal, round, and reactive to light.     Motor Exam   Muscle bulk: normal  Overall muscle tone: normal     Strength   Strength 5/5 except as noted.      Sensory Exam   Light touch normal.      Gait, Coordination, and Reflexes      Gait  Gait: normal     Coordination   Romberg: negative     Reflexes   Reflexes 2+ except as noted.   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  There is also L4-5 severe facet arthropathy resulting in bilateral foraminal and lateral recess stenosis.  There is also synovial cyst at the left L3-4 space     Assessment/Plan: I think that Jermaine would benefit from an L3-4 and L4-5 TLIF with posterior instrumentation across his previous autofusion at L5-S1 to the pelvis.  This would involve screws at the L3, L4, L5, S1, S2 pedicles with interbody's at the L3 and L4 spaces.  We discussed the risks and benefits and alternatives of surgery.  He understands and is willing to proceed with surgery.        Assessment      Diagnoses and all orders for this visit:     1. Lumbar radiculopathy (Primary)  -     XR Spine Scoliosis 2 or 3 Views  -     dexa bone density axial  -     XR Spine Lumbar Complete With Flex & Ext  -     Case Request; Standing  -     CBC & Differential; Future  -     Comprehensive Metabolic Panel; Future  -     aPTT; Future  -     Protime-INR; Future  -     Type & Screen; Future  -     ceFAZolin (ANCEF) 2 g in sodium chloride 0.9 % 100 mL IVPB  -      Case Request     2. Synovial cyst of lumbar facet joint  -     Case Request; Standing  -     CBC & Differential; Future  -     Comprehensive Metabolic Panel; Future  -     aPTT; Future  -     Protime-INR; Future  -     Type & Screen; Future  -     ceFAZolin (ANCEF) 2 g in sodium chloride 0.9 % 100 mL IVPB  -     Case Request     Other orders  -     Follow Anesthesia Guidelines / Protocol; Future  -     Obtain Informed Consent; Future  -     Provide NPO Instructions to Patient; Future  -     Chlorhexidine Skin Prep; Future  -     Follow Anesthesia Guidelines / Protocol; Standing  -     Verify / Perform Chlorhexidine Skin Prep; Standing  -     Inpatient Admission; Standing                       Indio Blas MD  01/27/23  11:05 EST

## 2023-02-27 NOTE — ANESTHESIA POSTPROCEDURE EVALUATION
Patient: Jermaine Denis    Procedure Summary     Date: 02/27/23 Room / Location: [facility] OR [address] [place] [address] MAIN OR    Anesthesia Start: 0654 Anesthesia Stop: 1254    Procedures:       LUMBAR THREE TO FOUR, FOUR TO FIVE TRANSFORAMINAL LUMBAR INTERBODY FUSION WITH LUMBAR THREE TO FOUR, FOUR TO FIVE, FIVE TO SACRAL ONE, SACRAL ONE TO TWO INSTRUMENTATION WITH ROBOTIC ASSISTANCE (Spine Lumbar)      CYSTOSCOPY FLEXIBLE, insertion of jean baptiste catheter with urethral dilation Diagnosis:       Spondylolisthesis, lumbar region      (Spondylolisthesis, lumbar region [M43.16])    Surgeons: Indio Blas MD; Jermain Duran MD Provider: Gena Engel MD    Anesthesia Type: general ASA Status: 3          Anesthesia Type: general    Vitals  Vitals Value Taken Time   /69 02/27/23 1341   Temp 36.7 °C (98 °F) 02/27/23 1249   Pulse 69 02/27/23 1348   Resp 18 02/27/23 1310   SpO2 98 % 02/27/23 1348   Vitals shown include unvalidated device data.        Post Anesthesia Care and Evaluation    Patient location during evaluation: PACU  Patient participation: complete - patient participated  Level of consciousness: awake  Pain score: 3  Pain management: adequate    Airway patency: patent  Anesthetic complications: No anesthetic complications  PONV Status: none  Cardiovascular status: acceptable  Respiratory status: acceptable  Hydration status: acceptable

## 2023-02-27 NOTE — ANESTHESIA PREPROCEDURE EVALUATION
Anesthesia Evaluation     Patient summary reviewed and Nursing notes reviewed   NPO Solid Status: > 8 hours  NPO Liquid Status: > 2 hours           Airway   Mallampati: I  Large neck circumference and Possible difficult intubation  Dental    (+) upper dentures and lower dentures    Pulmonary - normal exam   (+) sleep apnea,   Cardiovascular - normal exam    ECG reviewed    (+) hypertension, hyperlipidemia,       Neuro/Psych  (+) numbness,    GI/Hepatic/Renal/Endo    (+) obesity,  GERD,  hepatitis C, liver disease, diabetes mellitus type 2,     Musculoskeletal     Abdominal   (+) obese,    Substance History      OB/GYN          Other   arthritis,                      Anesthesia Plan    ASA 3     general     (I have reviewed all pertinent information including medical history,allergies, imaging, studies and laboratory results. I have explained risks and benefits to anesthesia, including but not limited to; dental damage, corneal abrasion, sore throat, nausea, vomiting, aspiration, nerve damage, failed block, MI,stroke and death. Patient has agreed to proceed. )    Anesthetic plan, risks, benefits, and alternatives have been provided, discussed and informed consent has been obtained with: patient.    Plan discussed with CRNA.        CODE STATUS:

## 2023-02-27 NOTE — OP NOTE
Open lumbar to pelvis fusion procedure Note    Jermaine Denis  2/27/2023  [id number]    SURGEON  Indio Blas MD    Assistant:  Yadi Feldman CSA was present throughout the entire surgery to provide intraoperative suction, retraction, suturing, and irrigation to promote visualization by the operative surgeon and assist with opening and closure.  The skilled assistance was necessary for the success of this case.  Our practice does not have a relationship with neurosurgical residents.    Indication: Jermaine Denis is a 77 y.o. male who presents with right-sided L4 and L5 radiculopathy with spondylolisthesis and dynamic subluxation at L4 and 5 and multiple levels of degenerative disc disease.  His pain was refractory to conservative management including physical therapy, epidural steroid injections.  We discussed the risks of surgery including injury to nerve roots, leakage of spinal fluid, need for further surgery down the line.  He understood and was willing to proceed with surgery      Pre-op Diagnosis:   Spondylolisthesis, lumbar region [M43.16]    Post-op Diagnosis:     Post-Op Diagnosis Codes:     * Spondylolisthesis, lumbar region [M43.16]    Procedure Performed:  Procedure(s):  LUMBAR THREE TO FOUR, FOUR TO FIVE TRANSFORAMINAL LUMBAR INTERBODY FUSION WITH LUMBAR THREE TO FOUR, FOUR TO FIVE, FIVE TO SACRAL ONE, SACRAL ONE TO TWO INSTRUMENTATION WITH ROBOTIC ASSISTANCE  CYSTOSCOPY FLEXIBLE, insertion of jean baptiste catheter with urethral dilation      1.  Lumbar Laminectomy, Medial Facetectomy, and Foraminotomy bilaterally at L4-L5 with decompression of the cauda equina and nerve roots    2.  Lumbar Laminectomy, Medial Facetectomy, and Foraminotomy bilaterally at L3-4 with decompression of the cauda equina and nerve roots    3. Posterior lumbar arthrodesis via transforaminal approach, including removal of the disk for decompression of the spinal nerve roots  L3-L4     4. Posterior lumbar arthrodesis via  transforaminal approach, including removal of the disk for decompression of the spinal nerve roots L4-5    5. Nonsegmental spinal instrumentation L3-S2     6.  Placement of S2 AI pelvic screws bilaterally    7. Application of intervertebral biomechanical device L3-L4.    8. Application of intervertebral biomechanical device L4-5    9. Placement of morselized bone allograft in addition to morselized autograft as osteopromotive material for posterior lateral fusion at L3-4 and L4-5    10.  Use of intraoperative navigation and robotic technology      Anesthesia: General    Staff:   Cell Saver : Jermaine Ordaz  Circulator: Ximena Bejarano, CHILO; Bibiana Quintanilla, CHILO; Madalyn Cavazos, RN  Scrub Person: Sri Yip; Misty Rincon  Vendor Representative: Lily Yanez Ben H  Assistant: Yadi Feldman CSA    Estimated Blood Loss: 625 mL    Specimens:  * No orders in the log *     Drains: 15 [place] PATRICK drain for deep fascia, 15 [place] PATRICK drain suprafascial    Findings: Significant stenosis in the right lateral recess    Complications: None apparent    Narrative Description:     Positioning:  After proper informed consent, the patient was taken to the OR in stable condition and placed under general anesthesia. Once anesthetized the patient was turned prone on an open Ponce table and was prepped and draped in the usual sterile fashion. A needle was used to localize the lumbar spine under fluoroscopic guidance and then an incision was infiltrated with local anesthesia.     Approach:  The skin incision was taken down to the superficial fascia which was then incised with Bovie electrocautery. A subperiosteal dissection was performed down to level facet joints bilaterally.  Loupe magnification was used to visualize the deep spinal anatomy. The dissection was carried past the facet joints to the transverse processes bilaterally from L3 to S2. Dissection was continued until the muscle was reflected off of the  TPs at each level bilaterally.    Posterior Instrumentation: At this point the AdGrokor robot was docked to the patient using a left-sided hip pin into the PSIS.  A series of x-rays with using a C-arm fitted with an image adapter was used to register the patient's spinal anatomy with previously obtained CT scans.  Medtronic Solera screw trajectories were previously planned prior to the surgery and entry zones were created using a iBid2Save Jarvis drill bit under navigated guidance and robotic assistance.  6.5 x 55 mm screws were placed at the bilateral L3, 6.5 x 50 mm screws were placed at bilateral L4, 6.5 x 45 mm screws were placed at bilateral L5, 7.5 x 45 mm screws were placed at bilateral S1, 8.5 x 80 mm screws were placed at bilateral S2 through the SI joint.      Arthrodesis: A trough was drilled into the pars interarticularis on the right side at the L3-4 level and the inferior facet was cleared of soft tissue using Bovie cautery, a Guillen laminectomy was completed using the high-speed drill and at this point the mesial facet joint was removed.  This was repeated at the L4-5 right foramen as well.  Kerrison rongeurs were used to clean off residual bone spurs from the mesial facets joints causing any foraminal or lateral recess stenosis. The L3-4 disc space through Kambin's triangle was identified on the right side and the annulus was incised using a 15 scalpel.  Disc material was removed using combination of pituitary and curette along with natalie of various sizes.  Once adequate discectomy had been performed a 7 x 27 Catalyft PL 40 spacing device was placed in the anterior portion of the vertebral body and visualized under fluoroscopic guidance.  We were satisfied with the amount of height estimated by the spacer and proceeded to pack the disc base with autograft and allograft.  We then inserted the the expandable cage under direct visualization and fluoroscopic guidance and expanded it to a satisfactory  angle.  Process was repeated at the L4-5 space with a 7 x 27 mm Catalyft PL cage which was also expanded under fluoroscopic guidance.    Decompression: A laminectomy was performed using high-speed Midas Jarvis drill at the L3-4 and L4-5 level(s) and Kerrison rongeurs were used to clean up bone spurs and ligamentous hypertrophy in order to decompress the thecal sac.  A Rachel instrument was used to palpate the lateral recess and ensure adequate decompression had been performed.    Posterolateral Fusion: The facet joints on the left side at L3-4 and L4-5 were decorticated along with the lamina and spinous processes.  A mix of allograft and autograft was packed over the available cortical bone at the spinous process, lamina, transverse process and facet joints and packed laterally underneath the hardware with morselized allograft mixed with autograft in order to promote posterior lateral fusion.     Closure:  The wound was irrigated and hemostasis was achieved with Flowseal and bipolar electrocautery of the muscle and epidural venous plexus.  No CSF leak was seen.  A 15 [place]uv[place] PATRICK drain was tunneled through the fascia and skin and secured using a 3-0 nylon suture.  Another 15 [place]uvian PATRICK drain was tunneled suprafascial he due to the large amount of fatty tissue in the suprafascial space.  The fascia was closed with 0 Vicryl suture and the superficial subcutaneous tissue closed with 3-0 Vicryl suture interrupted, and the superficial skin was closed with staples and dressed.  No intraoperative complications are recorded and patient was transferred to postop recovery in stable condition.    Indio Blas MD     Date: 2/27/2023  Time: 12:30 EST

## 2023-02-27 NOTE — OP NOTE
Operative Note    2/27/2023      Jermaine Denis  77 y.o.  1945  male  [id number]    Surgeon(s) and Role:  Jermain Duran MD - Primary     Pre-operative Diagnosis: Pre-Op Diagnosis Codes:     * Spondylolisthesis, lumbar region [M43.16]    Post-operative Diagnosis: Difficult Jean Baptiste catheter     Post-operative Diagnosis:   1. Pan- urethral stricture  2. History of bladder cancer    Procedures:  1. Cystoscopy, Flexible  2. Urethral dilation  3. Placement of catheter over a wire     Indications   Nursing unable to place jean baptiste pre-operatively. Patient asleep on table. Two physician agreement on proceed ing with Cystoscopy.     Findings   - Physical findings include circumcised penis  - Cysto reveals the proximal extent of the urethral stricture appears to pan-urethral   - Enlarged prostate   - Dilated urethra from to 20 Fr in size.   - 16 Fr Councill-tip Catheter placed at end of procedure.     Description of procedure:  After ensuring that all of the required equipment was ready and available a surgical pause was performed.      Jadon to advance scope into bladder with some difficulty due to pan-urethral stricture and enlarged prostate. Placed Sensorwire through urethra and into the bladder. Over the wire, the urethra was sequentially dilated with the S-curve dilateros from 8Fr to 20 Fr without difficulty. Over the wire, a 16 Fr [place] tip catheter was advanced into the bladder and the balloon was inflated with 10 mL sterile water.     There were no apparent complications. The patient woke up in the operating room and was taken to the recovery room in stable condition.      I was present and scrubbed for the entire procedure.      Complications: None    Specimens: None     Estimated Blood Loss:  None     Drain: 16 Fr urethral [place]-tip catheter, 10 mL in balloon.     Plan   - Transfer to PACU  - Antibiotics: per primary team  - Return to clinic within 1 week with Dr. Jermain Duran ([facility]) for  Catheter removal and voiding trial - the Urology schedulers have been notified.        Jermain Duran MD  [facility]  General & Reconstructive Urology  Office: [phone]  Fax: [phone]

## 2023-02-27 NOTE — PLAN OF CARE
Goal Outcome Evaluation:  Plan of Care Reviewed With: patient        Progress: improving  Outcome Evaluation: VSS, AOx4, jean baptiste left in place, c/o moderate back pain, PATRICK drains x2, no acute issues this shift

## 2023-02-28 ENCOUNTER — APPOINTMENT (OUTPATIENT)
Dept: GENERAL RADIOLOGY | Facility: HOSPITAL | Age: 78
DRG: 454 | End: 2023-02-28
Payer: OTHER GOVERNMENT

## 2023-02-28 PROCEDURE — 97116 GAIT TRAINING THERAPY: CPT

## 2023-02-28 PROCEDURE — 25010000002 CEFAZOLIN IN DEXTROSE 2-4 GM/100ML-% SOLUTION: Performed by: NEUROLOGICAL SURGERY

## 2023-02-28 PROCEDURE — 25010000002 ENOXAPARIN PER 10 MG: Performed by: NEUROLOGICAL SURGERY

## 2023-02-28 PROCEDURE — 99024 POSTOP FOLLOW-UP VISIT: CPT

## 2023-02-28 PROCEDURE — 72100 X-RAY EXAM L-S SPINE 2/3 VWS: CPT

## 2023-02-28 PROCEDURE — 97161 PT EVAL LOW COMPLEX 20 MIN: CPT

## 2023-02-28 RX ADMIN — Medication 10 ML: at 21:35

## 2023-02-28 RX ADMIN — ACETAMINOPHEN 650 MG: 325 TABLET, FILM COATED ORAL at 04:07

## 2023-02-28 RX ADMIN — DOCUSATE SODIUM 50MG AND SENNOSIDES 8.6MG 1 TABLET: 8.6; 5 TABLET, FILM COATED ORAL at 21:35

## 2023-02-28 RX ADMIN — DOCUSATE SODIUM 50MG AND SENNOSIDES 8.6MG 1 TABLET: 8.6; 5 TABLET, FILM COATED ORAL at 09:36

## 2023-02-28 RX ADMIN — ACETAMINOPHEN 650 MG: 325 TABLET, FILM COATED ORAL at 21:34

## 2023-02-28 RX ADMIN — FUROSEMIDE 20 MG: 40 TABLET ORAL at 06:07

## 2023-02-28 RX ADMIN — METHOCARBAMOL TABLETS 500 MG: 500 TABLET, COATED ORAL at 12:24

## 2023-02-28 RX ADMIN — GLIPIZIDE 5 MG: 5 TABLET ORAL at 06:07

## 2023-02-28 RX ADMIN — ATORVASTATIN CALCIUM 10 MG: 20 TABLET, FILM COATED ORAL at 09:28

## 2023-02-28 RX ADMIN — GABAPENTIN 800 MG: 400 CAPSULE ORAL at 21:34

## 2023-02-28 RX ADMIN — CEFAZOLIN SODIUM 2 G: 2 INJECTION, SOLUTION INTRAVENOUS at 01:42

## 2023-02-28 RX ADMIN — FINASTERIDE 5 MG: 5 TABLET, FILM COATED ORAL at 09:28

## 2023-02-28 RX ADMIN — ACETAMINOPHEN 650 MG: 325 TABLET, FILM COATED ORAL at 12:23

## 2023-02-28 RX ADMIN — METHOCARBAMOL TABLETS 500 MG: 500 TABLET, COATED ORAL at 17:43

## 2023-02-28 RX ADMIN — CEFAZOLIN SODIUM 2 G: 2 INJECTION, SOLUTION INTRAVENOUS at 12:23

## 2023-02-28 RX ADMIN — ACETAMINOPHEN 650 MG: 325 TABLET, FILM COATED ORAL at 00:28

## 2023-02-28 RX ADMIN — GABAPENTIN 800 MG: 400 CAPSULE ORAL at 09:28

## 2023-02-28 RX ADMIN — ACETAMINOPHEN 650 MG: 325 TABLET, FILM COATED ORAL at 16:26

## 2023-02-28 RX ADMIN — GABAPENTIN 800 MG: 400 CAPSULE ORAL at 16:27

## 2023-02-28 RX ADMIN — FERROUS SULFATE TAB 325 MG (65 MG ELEMENTAL FE) 325 MG: 325 (65 FE) TAB at 21:35

## 2023-02-28 RX ADMIN — LISINOPRIL 10 MG: 20 TABLET ORAL at 06:07

## 2023-02-28 RX ADMIN — METHOCARBAMOL TABLETS 500 MG: 500 TABLET, COATED ORAL at 21:35

## 2023-02-28 RX ADMIN — GLIPIZIDE 5 MG: 5 TABLET ORAL at 17:43

## 2023-02-28 RX ADMIN — OXYCODONE 10 MG: 5 TABLET ORAL at 21:34

## 2023-02-28 RX ADMIN — Medication 10 ML: at 09:36

## 2023-02-28 RX ADMIN — METHOCARBAMOL TABLETS 500 MG: 500 TABLET, COATED ORAL at 09:28

## 2023-02-28 RX ADMIN — ENOXAPARIN SODIUM 40 MG: 100 INJECTION SUBCUTANEOUS at 09:28

## 2023-02-28 RX ADMIN — PANTOPRAZOLE SODIUM 40 MG: 40 TABLET, DELAYED RELEASE ORAL at 06:07

## 2023-02-28 RX ADMIN — ACETAMINOPHEN 650 MG: 325 TABLET, FILM COATED ORAL at 09:28

## 2023-02-28 RX ADMIN — FERROUS SULFATE TAB 325 MG (65 MG ELEMENTAL FE) 325 MG: 325 (65 FE) TAB at 09:28

## 2023-02-28 NOTE — PROGRESS NOTES
[facility] POSTOP NOTE      CC: POD 1 L3-4, L4-5 TLIF with L3-S2 instrumentation with robotic assistance      Subjective     Interval History: The patient's preoperative right buttock pain, right leg pain weakness and numbness has now improved and resolved after the above procedure.  Mr. Denis' previously reported difficulty with ambulation is also now resolved as he states he is walking without difficulty.  Patient continues to have mild to moderate back pain.  He reports no difficulties with bowel movements. Postoperatively the patient has 2 PATRICK drains in place. Due to history of bladder cancer and urethral strictures, the patient had a Dodge catheter placed and has remained in place (being followed by urology).      Objective     Vital signs in last 24 hours:  Temp:  [97.2 °F (36.2 °C)-98.4 °F (36.9 °C)] 97.5 °F (36.4 °C)  Heart Rate:  [66-87] 72  Resp:  [16-20] 17  BP: ()/(62-80) 111/64    Intake/Output this shift:  No intake/output data recorded.    PATRICK #1-330 cc  since placement (100 cc since 7 AM 2/28)  PATRICK #2- 150 cc since placement (50 cc since 7 AM 2/28)     LABS:  .  Results from last 7 days   Lab Units 02/24/23  1257   SODIUM mmol/L 140   POTASSIUM mmol/L 4.4   CHLORIDE mmol/L 105   CO2 mmol/L 28.0   BUN mg/dL 17   CREATININE mg/dL 1.03   CALCIUM mg/dL 9.6   BILIRUBIN mg/dL 0.4   ALK PHOS U/L 76   ALT (SGPT) U/L 47*   AST (SGOT) U/L 32   GLUCOSE mg/dL 127*       .        IMAGING STUDIES:  DEXA scan 2/1/2023- no osteoporosis with lumbar spine T score 2.5    Proximal femur T score at -0.7  CT lumbar spine without contrast 2/24/2023-finding showed near complete loss of disc space at L5-S1 junction.  Multilevel moderate severe degenerative changes are seen with mild anterior listhesis of L4-L5, mild anterior listhesis of L4-L5, minimal retrolisthesis of L2-L3.  CT pelvis without contrast 2/24/2023- advanced osteoarthritis of the right hip joint is seen with space loss and migration of the  right femoral head.  Mild osteoarthritic changes are seen in the left hip joint.  Hip fracture, no osteonecrosis.  X-ray lumbar spine 2023- x-ray of the lumbar spine today shows postoperative changes as expected with no indication of hardware loosening or malalignment.  Other stable degenerative changes are seen.    I personally viewed and interpreted the patient's imaging studies.    Meds reviewed/changed: Yes  Acetaminophen tablet 650 m doses  Oxycodone 10 m dose  Dilaudid 0.5 mg x 1 dose-DC'd  Robaxin tablet 500 mg 4 times daily-4 doses  On subcutaneous Lovenox for DVT prophylaxis      Physical Exam:    General:   Awake, alert.  Sitting up in chair, in no acute distress  Back:    Low midline back incision dressing clean dry intact with no   drainage, swelling, redness, tenderness.  PATRICK #1- serosanguineous output,    PATRICK #2- sanguinous output   Motor:  Normal muscle strength in bilateral lower extremities.  Sensation: Normal to light touch chin LEs intact  Station and Gait:             Patient to work with PT.  Patient states he ambulated     ambulated around unit without difficulty x1 assist  Extremities: no calf swelling or tenderness. On lovenox for DVT prophylaxis        Assessment & Plan     ASSESSMENT:      Spondylolisthesis, lumbar region    Lumbar radiculopathy    Synovial cyst of lumbar facet joint    The patient is post of day 1 from L3-4, L4-5 TLIF with L3-L4, L4-L5, L5-S1, S1-S2 instrumentation with robotic assistance and appears to be doing very well.  He denies pain in the previously affected right leg and buttock. Denies weakness/numbnes in BLE.  We will have PT work with patient today and PATRICK drains will remain in place and be reevaluated tomorrow.       PLAN:     Continue PATRICK drains until drain output <50 cc's x 24hrs  BOWELS-no issues  Dodge catheter to remain in place at the discretion of urology  Pain appears to be managed with muscle relaxer and ice  Encouraged ICE   IS  "encouraged  SCD's  Work with PT     I discussed the patient's findings and my recommendations with patient, nursing staff and Dr. Blas    During patient visit, I utilized appropriate personal protective equipment including mask and gloves.  Mask used was standard procedure mask. Appropriate PPE was worn during the entire visit.  Hand hygiene was completed before and after.      LOS: 1 day       Evan Bakari [facility], APRN  2/28/2023  08:49 EST    \"Dictated utilizing Dragon dictation\".    "

## 2023-02-28 NOTE — CASE MANAGEMENT/SOCIAL WORK
Discharge Planning Assessment  [facility]     Patient Name: Jermaine Denis  MRN: [id number]  Today's Date: 2/28/2023    Admit Date: 2/27/2023    Plan: plans to return home with spouse- CCP will follow for needs   Discharge Needs Assessment     Row Name 02/28/23 1100       Living Environment    People in Home spouse    Name(s) of People in Home wife, Liz Denis    Current Living Arrangements home    Primary Care Provided by self    Provides Primary Care For no one    Family Caregiver if Needed spouse    Family Caregiver Names Liz Denis    Quality of Family Relationships helpful;involved;supportive       Resource/Environmental Concerns    Resource/Environmental Concerns none       Transition Planning    Patient/Family Anticipates Transition to home with family    Transportation Anticipated family or friend will provide       Discharge Needs Assessment    Equipment Currently Used at Home cpap;cane, straight;shower chair               Discharge Plan     Row Name 02/28/23 1101       Plan    Plan plans to return home with spouse- CCP will follow for needs    Patient/Family in Agreement with Plan yes    Plan Comments Spoke with patient at bedside.  Introduced self and explained role.  Facesheet verified.  Patient lives with his wife, Liz Denis, in a mobile home with ramp access.  Patient has a cane, walker, shower chair and c-pap.  Per urology notes, patient will dc home with catheter.  Per patient, he is able to manage this himself as he has had one in the past. He also states that his wife is a nursing assostance at a nursing home and can assist as needed.  She works 6pm-6am.  At [place], patient plans to return home and does not anticiapte any needs.  CCP will follow. Gaviota Chester RN              Continued Care and Services - Admitted Since 2/27/2023    Coordination has not been started for this encounter.       Expected Discharge Date and Time     Expected Discharge Date Expected Discharge Time    Feb 28, 2023           Demographic Summary     Row Name 02/28/23 1059       General Information    Admission Type observation    Arrived From PACU/recovery room    Referral Source admission list    Reason for Consult discharge planning    Preferred Language English               Functional Status     Row Name 02/28/23 1100       Functional Status    Usual Activity Tolerance moderate    Current Activity Tolerance moderate       Functional Status, IADL    Medications independent    Meal Preparation independent    Housekeeping independent    Laundry independent    Shopping independent       Mental Status    General Appearance WDL WDL               Psychosocial    No documentation.                Abuse/Neglect    No documentation.                Legal    No documentation.                Substance Abuse    No documentation.                Patient Forms    No documentation.                   Gaviota Chester RN

## 2023-02-28 NOTE — PLAN OF CARE
Goal Outcome Evaluation:  Patient is a pleasant gentleman. Had a L3-L5, S1-S2 laminectomy, fusion and instrumentation. Has 2 PATRICK drains. Receiving IV antibiotics and oral pain medications. Has a jean baptiste that needs to stay in place until follow up with Urology. Possible discharge later today. VSS. Call light is within reach. Will continue to monitor.

## 2023-02-28 NOTE — THERAPY DISCHARGE NOTE
Patient Name: Jermaine Denis  : 1945    MRN: [id number]                              Today's Date: 2023       Admit Date: 2023    Visit Dx:     ICD-10-CM ICD-9-CM   1. Lumbar radiculopathy  M54.16 724.4   2. Synovial cyst of lumbar facet joint  M71.38 727.40     Patient Active Problem List   Diagnosis   • Rotator cuff tear arthropathy, right   • Rotator cuff arthropathy   • Left rotator cuff tear arthropathy   • Osteoarthritis of left shoulder   • Cancer (HCC)   • Encounter for screening colonoscopy   • History of colon polyps   • Lumbar radiculopathy   • Synovial cyst of lumbar facet joint   • Spondylolisthesis, lumbar region     Past Medical History:   Diagnosis Date   • Anemia    • Arthritis    • BPH (benign prostatic hyperplasia)    • Cancer (HCC)    • Diabetes mellitus (HCC)     no meds now diet controlled was on insulin and then meds   • GERD (gastroesophageal reflux disease)    • High cholesterol    • History of bladder cancer     3 YR AGO   • History of COVID-19     PT CAN NOT REMEMBER WHEN   • History of hepatitis C     treated medically   • History of infection     AFTER KNEE SURGERY ON ANTIBIOTICS DAILY   • History of kidney stones    • History of transfusion    • Hypertension    • Low back pain    • Neck pain    • Neuropathy    • Psoriasis    • Right leg pain    • Sleep apnea     NOT CURRENTLY USING MACHINE   • Urethral stricture     caths self prn   • UTI (urinary tract infection)     freq uti     Past Surgical History:   Procedure Laterality Date   • ANKLE SURGERY Right     orif with hardware   • CARPAL TUNNEL RELEASE Left    • COLONOSCOPY     • COLONOSCOPY N/A 2022    Procedure: COLONOSCOPY FOR SCREENING;  Surgeon: Mik Joaquin MD;  Location: [facility] ENDOSCOPY;  Service: Gastroenterology;  Laterality: N/A;  DIVERTICULOSIS, hemorrhoids   • CYSTOSCOPY N/A 2023    Procedure: CYSTOSCOPY FLEXIBLE, insertion of jean baptiste catheter with urethral dilation;  Surgeon: Roger  Jermain BYRD MD;  Location: [facility] OR;  Service: Urology;  Laterality: N/A;   • ENDOSCOPY     • GASTRIC BYPASS      2008   • HAND SURGERY Right     thumb    • KNEE ARTHROPLASTY Left    • LUMBAR FUSION N/A 2/27/2023    Procedure: LUMBAR THREE TO FOUR, FOUR TO FIVE TRANSFORAMINAL LUMBAR INTERBODY FUSION WITH LUMBAR THREE TO FOUR, FOUR TO FIVE, FIVE TO SACRAL ONE, SACRAL ONE TO TWO INSTRUMENTATION WITH ROBOTIC ASSISTANCE;  Surgeon: Indio Blas MD;  Location: [facility] OR;  Service: Robotics - Neuro;  Laterality: N/A;   • ORIF WRIST FRACTURE Left    • TOTAL SHOULDER ARTHROPLASTY W/ DISTAL CLAVICLE EXCISION Right 01/18/2018    Procedure: Reverse Total Shoulder Arthroplasty;  Surgeon: Alex Ordaz MD;  Location: [facility] OR;  Service:    • TOTAL SHOULDER ARTHROPLASTY W/ DISTAL CLAVICLE EXCISION Left 06/28/2018    Procedure: TOTAL SHOULDER REVERSE ARTHROPLASTY;  Surgeon: Alex Ordaz MD;  Location: [facility] OR;  Service: Orthopedics      General Information     Row Name 02/28/23 1445          Physical Therapy Time and Intention    Document Type evaluation  -CW     Mode of Treatment individual therapy;physical therapy  -CW     Row Name 02/28/23 1445          General Information    Patient Profile Reviewed yes  -CW     Prior Level of Function independent:;transfer  wlker  -CW     Existing Precautions/Restrictions fall  recent falls, intermittantwalker use at baseline  -CW     Barriers to Rehab none identified  -CW     Row Name 02/28/23 1445          Living Environment    People in Home spouse  -CW     Row Name 02/28/23 1445          Home Main Entrance    Number of Stairs, Main Entrance none  -CW     Row Name 02/28/23 1445          Stairs Within Home, Primary    Number of Stairs, Within Home, Primary none  -CW     Row Name 02/28/23 1445          Cognition    Orientation Status (Cognition) oriented x 4  -CW     Row Name 02/28/23 1445          Safety Issues, Functional Mobility    Impairments Affecting  Function (Mobility) endurance/activity tolerance;strength;pain;balance  -CW           User Key  (r) = Recorded By, (t) = Taken By, (c) = Cosigned By    Initials Name Provider Type    Madalyn Charles PT Physical Therapist               Mobility     Row Name 02/28/23 1446          Bed Mobility    Comment, (Bed Mobility) [facility] at arrival and departure  -CW     Row Name 02/28/23 1446          Bed-Chair Transfer    Bed-Chair [place] (Transfers) standby assist  -CW     Assistive Device (Bed-Chair Transfers) walker, front-wheeled  -CW     Row Name 02/28/23 1446          Sit-Stand Transfer    Sit-Stand [place] (Transfers) standby assist  -CW     Assistive Device (Sit-Stand Transfers) walker, front-wheeled  -CW     Row Name 02/28/23 1446          Gait/Stairs (Locomotion)    [place] Level (Gait) standby assist  -CW     Assistive Device (Gait) walker, front-wheeled  -CW     Distance in Feet (Gait) 400'  -CW     Deviations/Abnormal Patterns (Gait) nga decreased;gait speed decreased  -CW     Comment, (Gait/Stairs) no overt loss of balance  -CW           User Key  (r) = Recorded By, (t) = Taken By, (c) = Cosigned By    Initials Name Provider Type    Madalyn Charles PT Physical Therapist               Obj/Interventions     Row Name 02/28/23 1447          Range of Motion Comprehensive    General Range of Motion bilateral lower extremity ROM WFL  -CW     Row Name 02/28/23 1447          Strength Comprehensive (MMT)    Comment, General Manual Muscle Testing (MMT) Assessment generalized weakness but WFL  -CW     Row Name 02/28/23 1447          Balance    Balance Assessment standing dynamic balance;standing static balance  -CW     Static Standing Balance standby assist  -CW     Dynamic Standing Balance standby assist  -CW     Position/Device Used, Standing Balance walker, front-wheeled;supported  -CW     Row Name 02/28/23 1447          Sensory Assessment (Somatosensory)    Sensory Assessment (Somatosensory)  sensation intact  -CW           User Key  (r) = Recorded By, (t) = Taken By, (c) = Cosigned By    Initials Name Provider Type    Madalyn Charles PT Physical Therapist               Goals/Plan     Row Name 02/28/23 1453          Bed Mobility Goal 1 (PT)    Activity/Assistive Device (Bed Mobility Goal 1, PT) --  -CW     [place] Level/Cues Needed (Bed Mobility Goal 1, PT) --  -CW     Time Frame (Bed Mobility Goal 1, PT) --  -CW     Row Name 02/28/23 1457          Transfer Goal 1 (PT)    Activity/Assistive Device (Transfer Goal 1, PT) --  -CW     [place] Level/Cues Needed (Transfer Goal 1, PT) --  -CW     Time Frame (Transfer Goal 1, PT) --  -CW     Row Name 02/28/23 1455          Gait Training Goal 1 (PT)    Activity/Assistive Device (Gait Training Goal 1, PT) --  -CW     [place] Level (Gait Training Goal 1, PT) --  -CW     Distance (Gait Training Goal 1, PT) --  -CW     Time Frame (Gait Training Goal 1, PT) --  -CW     Row Name 02/28/23 1453          Therapy Assessment/Plan (PT)    Planned Therapy Interventions (PT) --  -CW           User Key  (r) = Recorded By, (t) = Taken By, (c) = Cosigned By    Initials Name Provider Type    Madalyn Charles PT Physical Therapist               Clinical Impression     Row Name 02/28/23 4654          Pain    Pretreatment Pain Rating 4/10  -CW     Posttreatment Pain Rating 5/10  -CW     Pain Location - back  -CW     Pain Intervention(s) Repositioned;Rest  -CW     Row Name 02/28/23 6349          Plan of Care Review    Plan of Care Reviewed With patient  -CW     Outcome Evaluation Pt seen for PT eval this PM POD 1 L3-S1 fusion.Pt lives with spouse, has no steps to enter, owns rollator in which he intermittantly uses at baseline. Hx of falls. Today, pt mobilizing well. Heis in the chair and eager to mobilize. he completes all functional mobility with sba usinga RW, no overt loss of balance noted. He ambulated 400' with PT, OK to mobilize daily with [facility] staff.  No further PT needs identified, PT signing off. Plans home with assist at d/c.  -CW     Row Name 02/28/23 1447          Therapy Assessment/Plan (PT)    Rehab Potential (PT) --  -CW     Criteria for Skilled Interventions Met (PT) no;no problems identified which require skilled intervention  -CW     Therapy Frequency (PT) evaluation only  -CW     Row Name 02/28/23 1447          Vital Signs    O2 Delivery Pre Treatment room air  -CW     Row Name 02/28/23 1447          Positioning and Restraints    Pre-Treatment Position sitting in chair/recliner  -CW     Post Treatment Position chair  -CW     In Chair reclined;call light within reach;encouraged to call for assist;exit alarm on;notified nsg  -CW           User Key  (r) = Recorded By, (t) = Taken By, (c) = Cosigned By    Initials Name Provider Type    Madalyn Charles, ROHIT Physical Therapist               Outcome Measures     Row Name 02/28/23 1450          How much help from another person do you currently need...    Turning from your back to your side while in flat bed without using bedrails? 4  -CW     Moving from lying on back to sitting on the side of a flat bed without bedrails? 3  -CW     Moving to and from a bed to a chair (including a wheelchair)? 4  -CW     Standing up from a chair using your arms (e.g., wheelchair, bedside chair)? 4  -CW     Climbing 3-5 steps with a railing? 4  -CW     To walk in hospital room? 4  -CW     AM-PAC 6 Clicks Score (PT) 23  -CW     Highest level of mobility 7 --> Walked 25 feet or more  -     Row Name 02/28/23 1450          Functional Assessment    Outcome Measure Options AM-PAC 6 Clicks Basic Mobility (PT)  -CW           User Key  (r) = Recorded By, (t) = Taken By, (c) = Cosigned By    Initials Name Provider Type    Madalyn Charles PT Physical Therapist                PT Recommendation and Plan     Plan of Care Reviewed With: patient  Outcome Evaluation: Pt seen for PT skyler this PM POD 1 L3-S1 fusion.Pt lives with  spouse, has no steps to enter, owns rollator in which he intermittantly uses at baseline. Hx of falls. Today, pt mobilizing well. Heis in the chair and eager to mobilize. he completes all functional mobility with sba usinga RW, no overt loss of balance noted. He ambulated 400' with PT, OK to mobilize daily with [facility] staff. No further PT needs identified, PT signing off. Plans home with assist at d/c.     Time Calculation:    PT Charges     Row Name 02/28/23 1445             Time Calculation    Start Time 1441  -CW      Stop Time 1453  -CW      Time Calculation (min) 12 min  -CW      PT Received On 02/28/23  -CW      PT - Next Appointment 03/01/23  -CW      PT Goal Re-Cert Due Date 03/06/23  -CW         Time Calculation- PT    Total Timed Code Minutes- PT 8 minute(s)  -CW         Timed Charges    17386 - Gait Training Minutes  8  -CW         Total Minutes    Timed Charges Total Minutes 8  -CW       Total Minutes 8  -CW            User Key  (r) = Recorded By, (t) = Taken By, (c) = Cosigned By    Initials Name Provider Type    CW Madalyn Hernandez, PT Physical Therapist              Therapy Charges for Today     Code Description Service Date Service Provider Modifiers Qty    99713435872 HC GAIT TRAINING EA 15 MIN 2/28/2023 Madalyn Hernandez, PT GP 1    [id number] HC PT EVAL LOW COMPLEXITY 3 2/28/2023 Madalyn Hernandez, PT GP 1          PT G-Codes  Outcome Measure Options: AM-PAC 6 Clicks Basic Mobility (PT)  AM-PAC 6 Clicks Score (PT): 23    PT Discharge Summary  Anticipated Discharge Disposition (PT): home with assist, home with home health    Madalyn Hernandez PT  2/28/2023

## 2023-02-28 NOTE — PLAN OF CARE
Goal Outcome Evaluation:  Plan of Care Reviewed With: patient           Outcome Evaluation: Pt seen for PT skyler this PM POD 1 L3-S1 fusion.Pt lives with spouse, has no steps to enter, owns rollator in which he intermittantly uses at baseline. Hx of falls. Today, pt mobilizing well. Heis in the chair and eager to mobilize. he completes all functional mobility with sba usinga RW, no overt loss of balance noted. He ambulated 400' with PT, OK to mobilize daily with [facility] staff. No further PT needs identified, PT signing off. Plans home with assist at d/c.

## 2023-02-28 NOTE — PROGRESS NOTES
"  FIRST UROLOGY DAILY PROGRESS NOTE    Patient Identification  Name: Jermaine Denis  Age: 77 y.o.  Sex: male  :  1945  MRN: [id number]    Date: 2023             Subjective:  1. Pan- urethral stricture- s/p cysto, urethral dilation 22 with jean baptiste placement    2. History of bladder cancer    Doing well this AM. Urine clear yellow    Objective:    Scheduled Meds:acetaminophen, 650 mg, Oral, Q4H  atorvastatin, 10 mg, Oral, Daily  ceFAZolin, 2 g, Intravenous, Q8H  enoxaparin, 40 mg, Subcutaneous, Daily  ferrous sulfate, 325 mg, Oral, BID  finasteride, 5 mg, Oral, Daily  furosemide, 20 mg, Oral, QAM  gabapentin, 800 mg, Oral, TID  glipizide, 5 mg, Oral, BID AC  lisinopril, 10 mg, Oral, QAM  methocarbamol, 500 mg, Oral, 4x Daily  pantoprazole, 40 mg, Oral, Q AM  senna-docusate sodium, 1 tablet, Oral, BID  sodium chloride, 10 mL, Intravenous, Q12H  vitamin B-12, 500 mcg, Oral, Every Other Day      Continuous Infusions:lactated ringers, 9 mL/hr, Last Rate: 9 mL/hr (23 06)      PRN Meds:•  cetirizine  •  HYDROmorphone **AND** naloxone  •  magnesium hydroxide  •  ondansetron **OR** ondansetron  •  oxyCODONE  •  sodium chloride  •  sodium chloride    Vital signs in last 24 hours:  Temp:  [97.2 °F (36.2 °C)-98.4 °F (36.9 °C)] 97.5 °F (36.4 °C)  Heart Rate:  [66-87] 72  Resp:  [16-20] 17  BP: ()/(62-80) 111/64    Intake/Output:    Intake/Output Summary (Last 24 hours) at 2023 0625  Last data filed at 2023 0536  Gross per 24 hour   Intake 2150 ml   Output 2280 ml   Net -130 ml       Exam:  /64 (BP Location: Left arm, Patient Position: Lying)   Pulse 72   Temp 97.5 °F (36.4 °C) (Oral)   Resp 17   Ht 162.6 cm (64.02\")   Wt 108 kg (237 lb 10.5 oz)   SpO2 95%   BMI 40.77 kg/m²     General Appearance:    Alert, cooperative, no acute distress   Back:     Symmetric, no CVA tenderness   Lungs:     Respirations unlabored   Heart:    Regular rate and rhythm   Abdomen:     Soft, nttp "   Genitalia:   jean baptiste draining CYU   Extremities:   Extremities normal, atraumatic, no cyanosis or edema   Skin:   Skin color, texture, turgor normal, no rashes or lesions        Data Review:  All labs (24hrs):   Recent Results (from the past 24 hour(s))   POC Glucose Once    Collection Time: 02/27/23  1:00 PM    Specimen: Blood   Result Value Ref Range    Glucose 174 (H) 70 - 130 mg/dL            Assessment:    Spondylolisthesis, lumbar region    Lumbar radiculopathy    Synovial cyst of lumbar facet joint      1. Pan- urethral stricture- s/p cysto, urethral dilation 2/27/22 with jean baptiste placement    2. History of bladder cancer    Plan:  Cont jean baptiste, pt to be dc'd with jean baptiste and f/u in 7 days for voiding trial  Urology to sign off    John Dorsey MD  2/28/2023  06:25 EST

## 2023-03-01 LAB
BASOPHILS # BLD AUTO: 0.01 10*3/MM3 (ref 0–0.2)
BASOPHILS NFR BLD AUTO: 0.1 % (ref 0–1.5)
DEPRECATED RDW RBC AUTO: 46 FL (ref 37–54)
EOSINOPHIL # BLD AUTO: 0.03 10*3/MM3 (ref 0–0.4)
EOSINOPHIL NFR BLD AUTO: 0.4 % (ref 0.3–6.2)
ERYTHROCYTE [DISTWIDTH] IN BLOOD BY AUTOMATED COUNT: 12.6 % (ref 12.3–15.4)
HCT VFR BLD AUTO: 28.8 % (ref 37.5–51)
HGB BLD-MCNC: 9 G/DL (ref 13–17.7)
IMM GRANULOCYTES # BLD AUTO: 0.07 10*3/MM3 (ref 0–0.05)
IMM GRANULOCYTES NFR BLD AUTO: 0.9 % (ref 0–0.5)
LYMPHOCYTES # BLD AUTO: 0.96 10*3/MM3 (ref 0.7–3.1)
LYMPHOCYTES NFR BLD AUTO: 12.6 % (ref 19.6–45.3)
MCH RBC QN AUTO: 31.1 PG (ref 26.6–33)
MCHC RBC AUTO-ENTMCNC: 31.3 G/DL (ref 31.5–35.7)
MCV RBC AUTO: 99.7 FL (ref 79–97)
MONOCYTES # BLD AUTO: 0.77 10*3/MM3 (ref 0.1–0.9)
MONOCYTES NFR BLD AUTO: 10.1 % (ref 5–12)
NEUTROPHILS NFR BLD AUTO: 5.75 10*3/MM3 (ref 1.7–7)
NEUTROPHILS NFR BLD AUTO: 75.9 % (ref 42.7–76)
NRBC BLD AUTO-RTO: 0 /100 WBC (ref 0–0.2)
PLATELET # BLD AUTO: 181 10*3/MM3 (ref 140–450)
PMV BLD AUTO: 11 FL (ref 6–12)
RBC # BLD AUTO: 2.89 10*6/MM3 (ref 4.14–5.8)
WBC NRBC COR # BLD: 7.59 10*3/MM3 (ref 3.4–10.8)

## 2023-03-01 PROCEDURE — 99024 POSTOP FOLLOW-UP VISIT: CPT

## 2023-03-01 PROCEDURE — 85025 COMPLETE CBC W/AUTO DIFF WBC: CPT | Performed by: NEUROLOGICAL SURGERY

## 2023-03-01 PROCEDURE — 25010000002 ENOXAPARIN PER 10 MG: Performed by: NEUROLOGICAL SURGERY

## 2023-03-01 RX ORDER — SODIUM CHLORIDE, SODIUM LACTATE, POTASSIUM CHLORIDE, CALCIUM CHLORIDE 600; 310; 30; 20 MG/100ML; MG/100ML; MG/100ML; MG/100ML
75 INJECTION, SOLUTION INTRAVENOUS CONTINUOUS
Status: DISCONTINUED | OUTPATIENT
Start: 2023-03-01 | End: 2023-03-02

## 2023-03-01 RX ADMIN — GLIPIZIDE 5 MG: 5 TABLET ORAL at 06:54

## 2023-03-01 RX ADMIN — OXYCODONE 10 MG: 5 TABLET ORAL at 20:13

## 2023-03-01 RX ADMIN — ACETAMINOPHEN 650 MG: 325 TABLET, FILM COATED ORAL at 20:09

## 2023-03-01 RX ADMIN — Medication 10 ML: at 08:10

## 2023-03-01 RX ADMIN — OXYCODONE 10 MG: 5 TABLET ORAL at 06:17

## 2023-03-01 RX ADMIN — ACETAMINOPHEN 650 MG: 325 TABLET, FILM COATED ORAL at 15:01

## 2023-03-01 RX ADMIN — FINASTERIDE 5 MG: 5 TABLET, FILM COATED ORAL at 08:08

## 2023-03-01 RX ADMIN — GLIPIZIDE 5 MG: 5 TABLET ORAL at 17:52

## 2023-03-01 RX ADMIN — OXYCODONE 10 MG: 5 TABLET ORAL at 10:15

## 2023-03-01 RX ADMIN — Medication 10 ML: at 20:14

## 2023-03-01 RX ADMIN — PANTOPRAZOLE SODIUM 40 MG: 40 TABLET, DELAYED RELEASE ORAL at 06:17

## 2023-03-01 RX ADMIN — METHOCARBAMOL TABLETS 500 MG: 500 TABLET, COATED ORAL at 17:52

## 2023-03-01 RX ADMIN — SODIUM CHLORIDE, POTASSIUM CHLORIDE, SODIUM LACTATE AND CALCIUM CHLORIDE 75 ML/HR: 600; 310; 30; 20 INJECTION, SOLUTION INTRAVENOUS at 06:53

## 2023-03-01 RX ADMIN — METHOCARBAMOL TABLETS 500 MG: 500 TABLET, COATED ORAL at 20:10

## 2023-03-01 RX ADMIN — GABAPENTIN 800 MG: 400 CAPSULE ORAL at 20:09

## 2023-03-01 RX ADMIN — ACETAMINOPHEN 650 MG: 325 TABLET, FILM COATED ORAL at 06:17

## 2023-03-01 RX ADMIN — ACETAMINOPHEN 650 MG: 325 TABLET, FILM COATED ORAL at 01:30

## 2023-03-01 RX ADMIN — ATORVASTATIN CALCIUM 10 MG: 20 TABLET, FILM COATED ORAL at 08:08

## 2023-03-01 RX ADMIN — FERROUS SULFATE TAB 325 MG (65 MG ELEMENTAL FE) 325 MG: 325 (65 FE) TAB at 20:10

## 2023-03-01 RX ADMIN — GABAPENTIN 800 MG: 400 CAPSULE ORAL at 08:08

## 2023-03-01 RX ADMIN — ACETAMINOPHEN 650 MG: 325 TABLET, FILM COATED ORAL at 09:20

## 2023-03-01 RX ADMIN — DOCUSATE SODIUM 50MG AND SENNOSIDES 8.6MG 1 TABLET: 8.6; 5 TABLET, FILM COATED ORAL at 08:08

## 2023-03-01 RX ADMIN — METHOCARBAMOL TABLETS 500 MG: 500 TABLET, COATED ORAL at 12:20

## 2023-03-01 RX ADMIN — Medication 500 MCG: at 08:08

## 2023-03-01 RX ADMIN — OXYCODONE 10 MG: 5 TABLET ORAL at 15:01

## 2023-03-01 RX ADMIN — DOCUSATE SODIUM 50MG AND SENNOSIDES 8.6MG 1 TABLET: 8.6; 5 TABLET, FILM COATED ORAL at 20:10

## 2023-03-01 RX ADMIN — ENOXAPARIN SODIUM 40 MG: 100 INJECTION SUBCUTANEOUS at 08:09

## 2023-03-01 RX ADMIN — GABAPENTIN 800 MG: 400 CAPSULE ORAL at 17:52

## 2023-03-01 RX ADMIN — METHOCARBAMOL TABLETS 500 MG: 500 TABLET, COATED ORAL at 08:08

## 2023-03-01 RX ADMIN — FERROUS SULFATE TAB 325 MG (65 MG ELEMENTAL FE) 325 MG: 325 (65 FE) TAB at 08:08

## 2023-03-01 NOTE — PROGRESS NOTES
[facility] LUMBAR NEUROSURGERY POSTOP NOTE      CC: POD 2  L3-4, L4-5 TLIF with L3-S2 instrumentation with robotic assistance      Subjective     Interval History: Pain managed well.  Preoperative right leg/buttock pain improved.  Per PT, the patient was successful with ambulation yesterday.  According to the nurse, the patient had a period of hypotension last night around 2 AM-patient denied symptoms of dizziness.  A 500 mL bolus of lactated Ringer's was administered.  BP in acceptable range.       Objective     Vital signs in last 24 hours:  Temp:  [97.6 °F (36.4 °C)-99.4 °F (37.4 °C)] 99.4 °F (37.4 °C)  Heart Rate:  [65-82] 82  Resp:  [16] 16  BP: ()/(35-68) 98/53    Intake/Output this shift:  I/O this shift:  In: 240 [P.O.:240]  Out: -     PATRICK #1: (250 cc in last 24-hour)  PATRICK #2: (225 cc in last 24 hours)    LABS:  .  Results from last 7 days   Lab Units 23  0315   WBC 10*3/mm3 7.59   HEMOGLOBIN g/dL 9.0*   HEMATOCRIT % 28.8*   PLATELETS 10*3/mm3 181         IMAGING STUDIES:  No new imaging.    I personally viewed and interpreted the patient's labs/chart.    Meds reviewed/changed: Yes  Acetaminophen 650 m doses  Robaxin 500 mg 4 times daily  Roxicodone 10 mg every 4 hours as needed: 2 doses      Physical Exam:    General:   Awake, alert.  Sitting up in chair watching television, appears comfortable  Back:     Incision covered with dressing There is no redness, swelling,   drainage.  Superior PATRICK draining serosanguineous fluid.  Inferior PATRICK draining sanguinous fluid.   Motor:   Normal muscle strength, bulk and tone in lower extremities.  No fasciculations, rigidity, spasticity, or abnormal movements.  Sensation:   Normal to light touch chin LEs  Station and Gait:             Per PT note 2023-  Today, pt mobilizing well. He is in the chair and eager to mobilize. he completes all functional mobility with sba usinga RW, no overt loss of balance noted. He ambulated 400' with PT, OK to mobilize daily  "with [facility] staff. No further PT needs identified, PT signing off.  Extremities:   Receiving subcutanuous Lovenox for DVT prophylaxis      Assessment & Plan     ASSESSMENT:      Spondylolisthesis, lumbar region    Lumbar radiculopathy    Synovial cyst of lumbar facet joint    The patient is postop day 2 from L3-L4, L4-L5 TLIF with L3-S2 instrumentation with robotic assistance. Physical therapy has seen and signed off and we will continue to monitor PATRICK drains and discontinue when appropriate.  Mild to moderate low back pain persists as expected from his surgery.      PLAN:     DRAIN-continue PATRICK drains until output less than 50 cc x 24 hours.  BOWELS-continue bowel regimen  VOID-continue Dodge catheter at discharge to be followed by urology  PAIN-managed with narcotics, muscle relaxer, Tylenol, ice   Continue to encourage incentive spirometer use  SCD/DVT prophy -Lovenox  Physical therapy-signed off      I discussed the patient's findings and my recommendations with patient    During patient visit, I utilized appropriate personal protective equipment including mask and gloves.  Mask used was standard procedure mask. Appropriate PPE was worn during the entire visit.  Hand hygiene was completed before and after.      LOS: 1 day       Evan Baugh, APRN  3/1/2023  09:12 EST    \"Dictated utilizing Dragon dictation\".    "

## 2023-03-01 NOTE — PLAN OF CARE
Goal Outcome Evaluation:              Outcome Evaluation: AxOx4, pain controlled with prn roxicodone, assist x1 with walker, blood pressure 75/35 at 2am check, patient denied any symptoms of dizziness. 500ml LR bolus given per order, blood pressure increased to 107/72. Currently on LR continous @75/hr. dressing and PATRICK drain CDI.

## 2023-03-01 NOTE — PLAN OF CARE
Goal Outcome Evaluation:           Progress: improving  Outcome Evaluation: 78 y/o s/POD2 L3-L5 transforaminal lumbar interbody fusion w/ L3-S2 instrumentation. AOX4. Pt up w/ assist x1 to chair. FC remains in place for d/c home. Neuro checks WNL, no c/o numbness/tingling. Bordered dsg c/d/i and 2 PATRICK drains remain overnight, see output. Pain managed via PO pills. PIV running LR at 75cc/hr. Needs met. VSS. RA. Educated pt on falls precautions. Plan to d/c home pending PATRICK drainage. Will CTM.

## 2023-03-02 LAB
BACTERIA UR QL AUTO: ABNORMAL /HPF
BASOPHILS # BLD AUTO: 0.03 10*3/MM3 (ref 0–0.2)
BASOPHILS NFR BLD AUTO: 0.5 % (ref 0–1.5)
BILIRUB UR QL STRIP: NEGATIVE
CLARITY UR: ABNORMAL
COLOR UR: YELLOW
D-LACTATE SERPL-SCNC: 1.3 MMOL/L (ref 0.5–2)
DEPRECATED RDW RBC AUTO: 43.3 FL (ref 37–54)
EOSINOPHIL # BLD AUTO: 0.08 10*3/MM3 (ref 0–0.4)
EOSINOPHIL NFR BLD AUTO: 1.4 % (ref 0.3–6.2)
ERYTHROCYTE [DISTWIDTH] IN BLOOD BY AUTOMATED COUNT: 12.3 % (ref 12.3–15.4)
GLUCOSE UR STRIP-MCNC: NEGATIVE MG/DL
HCT VFR BLD AUTO: 30.6 % (ref 37.5–51)
HGB BLD-MCNC: 9.8 G/DL (ref 13–17.7)
HGB UR QL STRIP.AUTO: ABNORMAL
HOLD SPECIMEN: NORMAL
HYALINE CASTS UR QL AUTO: ABNORMAL /LPF
IMM GRANULOCYTES # BLD AUTO: 0.02 10*3/MM3 (ref 0–0.05)
IMM GRANULOCYTES NFR BLD AUTO: 0.4 % (ref 0–0.5)
KETONES UR QL STRIP: NEGATIVE
LEUKOCYTE ESTERASE UR QL STRIP.AUTO: ABNORMAL
LYMPHOCYTES # BLD AUTO: 0.81 10*3/MM3 (ref 0.7–3.1)
LYMPHOCYTES NFR BLD AUTO: 14.3 % (ref 19.6–45.3)
MCH RBC QN AUTO: 31.2 PG (ref 26.6–33)
MCHC RBC AUTO-ENTMCNC: 32 G/DL (ref 31.5–35.7)
MCV RBC AUTO: 97.5 FL (ref 79–97)
MONOCYTES # BLD AUTO: 0.58 10*3/MM3 (ref 0.1–0.9)
MONOCYTES NFR BLD AUTO: 10.2 % (ref 5–12)
NEUTROPHILS NFR BLD AUTO: 4.15 10*3/MM3 (ref 1.7–7)
NEUTROPHILS NFR BLD AUTO: 73.2 % (ref 42.7–76)
NITRITE UR QL STRIP: NEGATIVE
NRBC BLD AUTO-RTO: 0 /100 WBC (ref 0–0.2)
PH UR STRIP.AUTO: <=5 [PH] (ref 5–8)
PLATELET # BLD AUTO: 205 10*3/MM3 (ref 140–450)
PMV BLD AUTO: 10.8 FL (ref 6–12)
PROCALCITONIN SERPL-MCNC: 0.27 NG/ML (ref 0–0.25)
PROT UR QL STRIP: ABNORMAL
RBC # BLD AUTO: 3.14 10*6/MM3 (ref 4.14–5.8)
RBC # UR STRIP: ABNORMAL /HPF
REF LAB TEST METHOD: ABNORMAL
SP GR UR STRIP: 1.02 (ref 1–1.03)
SQUAMOUS #/AREA URNS HPF: ABNORMAL /HPF
UROBILINOGEN UR QL STRIP: ABNORMAL
WBC # UR STRIP: ABNORMAL /HPF
WBC NRBC COR # BLD: 5.67 10*3/MM3 (ref 3.4–10.8)

## 2023-03-02 PROCEDURE — 84145 PROCALCITONIN (PCT): CPT | Performed by: NURSE PRACTITIONER

## 2023-03-02 PROCEDURE — 99024 POSTOP FOLLOW-UP VISIT: CPT | Performed by: NURSE PRACTITIONER

## 2023-03-02 PROCEDURE — 25010000002 CEFTRIAXONE PER 250 MG: Performed by: NURSE PRACTITIONER

## 2023-03-02 PROCEDURE — 81001 URINALYSIS AUTO W/SCOPE: CPT | Performed by: NURSE PRACTITIONER

## 2023-03-02 PROCEDURE — 25010000002 ENOXAPARIN PER 10 MG: Performed by: NEUROLOGICAL SURGERY

## 2023-03-02 PROCEDURE — 83605 ASSAY OF LACTIC ACID: CPT | Performed by: NURSE PRACTITIONER

## 2023-03-02 PROCEDURE — 87086 URINE CULTURE/COLONY COUNT: CPT | Performed by: NURSE PRACTITIONER

## 2023-03-02 PROCEDURE — 85025 COMPLETE CBC W/AUTO DIFF WBC: CPT | Performed by: NURSE PRACTITIONER

## 2023-03-02 PROCEDURE — 87040 BLOOD CULTURE FOR BACTERIA: CPT | Performed by: NURSE PRACTITIONER

## 2023-03-02 RX ORDER — ACETAMINOPHEN 325 MG/1
650 TABLET ORAL EVERY 6 HOURS PRN
Status: DISCONTINUED | OUTPATIENT
Start: 2023-03-02 | End: 2023-03-05 | Stop reason: HOSPADM

## 2023-03-02 RX ORDER — OXYCODONE AND ACETAMINOPHEN 7.5; 325 MG/1; MG/1
1 TABLET ORAL EVERY 4 HOURS PRN
Status: DISCONTINUED | OUTPATIENT
Start: 2023-03-02 | End: 2023-03-05 | Stop reason: HOSPADM

## 2023-03-02 RX ADMIN — METHOCARBAMOL TABLETS 500 MG: 500 TABLET, COATED ORAL at 18:56

## 2023-03-02 RX ADMIN — ACETAMINOPHEN 650 MG: 325 TABLET, FILM COATED ORAL at 09:31

## 2023-03-02 RX ADMIN — ACETAMINOPHEN 325 MG: 325 TABLET, FILM COATED ORAL at 15:56

## 2023-03-02 RX ADMIN — OXYCODONE 10 MG: 5 TABLET ORAL at 05:01

## 2023-03-02 RX ADMIN — ENOXAPARIN SODIUM 40 MG: 100 INJECTION SUBCUTANEOUS at 09:31

## 2023-03-02 RX ADMIN — PANTOPRAZOLE SODIUM 40 MG: 40 TABLET, DELAYED RELEASE ORAL at 05:01

## 2023-03-02 RX ADMIN — GABAPENTIN 800 MG: 400 CAPSULE ORAL at 09:31

## 2023-03-02 RX ADMIN — FERROUS SULFATE TAB 325 MG (65 MG ELEMENTAL FE) 325 MG: 325 (65 FE) TAB at 21:20

## 2023-03-02 RX ADMIN — FINASTERIDE 5 MG: 5 TABLET, FILM COATED ORAL at 09:31

## 2023-03-02 RX ADMIN — GLIPIZIDE 5 MG: 5 TABLET ORAL at 16:02

## 2023-03-02 RX ADMIN — OXYCODONE HYDROCHLORIDE AND ACETAMINOPHEN 1 TABLET: 7.5; 325 TABLET ORAL at 15:56

## 2023-03-02 RX ADMIN — OXYCODONE 10 MG: 5 TABLET ORAL at 00:16

## 2023-03-02 RX ADMIN — ACETAMINOPHEN 650 MG: 325 TABLET, FILM COATED ORAL at 00:16

## 2023-03-02 RX ADMIN — GLIPIZIDE 5 MG: 5 TABLET ORAL at 09:31

## 2023-03-02 RX ADMIN — METHOCARBAMOL TABLETS 500 MG: 500 TABLET, COATED ORAL at 21:19

## 2023-03-02 RX ADMIN — GABAPENTIN 800 MG: 400 CAPSULE ORAL at 15:56

## 2023-03-02 RX ADMIN — ATORVASTATIN CALCIUM 10 MG: 20 TABLET, FILM COATED ORAL at 09:31

## 2023-03-02 RX ADMIN — Medication 10 ML: at 09:32

## 2023-03-02 RX ADMIN — METHOCARBAMOL TABLETS 500 MG: 500 TABLET, COATED ORAL at 12:16

## 2023-03-02 RX ADMIN — GABAPENTIN 800 MG: 400 CAPSULE ORAL at 21:19

## 2023-03-02 RX ADMIN — ACETAMINOPHEN 650 MG: 325 TABLET, FILM COATED ORAL at 05:01

## 2023-03-02 RX ADMIN — DOCUSATE SODIUM 50MG AND SENNOSIDES 8.6MG 1 TABLET: 8.6; 5 TABLET, FILM COATED ORAL at 09:31

## 2023-03-02 RX ADMIN — METHOCARBAMOL TABLETS 500 MG: 500 TABLET, COATED ORAL at 09:31

## 2023-03-02 RX ADMIN — FERROUS SULFATE TAB 325 MG (65 MG ELEMENTAL FE) 325 MG: 325 (65 FE) TAB at 09:31

## 2023-03-02 RX ADMIN — CEFTRIAXONE SODIUM 1 G: 1 INJECTION, POWDER, FOR SOLUTION INTRAMUSCULAR; INTRAVENOUS at 18:55

## 2023-03-02 RX ADMIN — DOCUSATE SODIUM 50MG AND SENNOSIDES 8.6MG 1 TABLET: 8.6; 5 TABLET, FILM COATED ORAL at 21:19

## 2023-03-02 RX ADMIN — Medication 10 ML: at 21:19

## 2023-03-02 NOTE — PROGRESS NOTES
[facility] THORACIC/LUMBAR NEUROSURGERY POSTOP NOTE      CC: POD 3 L3-5 decompression/TLIF with L3-S2 instrumentation      Subjective     Interval History: Reports no back pain.  Some right thigh soreness.  Ambulating frequently.  Passing flatus and having some small BMs.  Tolerating diet.  Dodge catheter remains.  Fever overnight.  Denies chills.      Objective     Vital signs in last 24 hours:  Temp:  [97.8 °F (36.6 °C)-101.7 °F (38.7 °C)] 99 °F (37.2 °C)  Heart Rate:  [70-87] 81  Resp:  [16-17] 17  BP: ()/(56-67) 106/67    Intake/Output this shift:  I/O this shift:  In: 720 [P.O.:120; Other:600]  Out: 250 [Urine:250]    PATRICK 1 left-50 cc x 24 hours  PATRICK to right-75 cc x 24 hours    LABS:  Results from last 7 days   Lab Units 03/02/23  0929 03/01/23  0315 02/24/23  1257   WBC 10*3/mm3 5.67 7.59 4.85   HEMOGLOBIN g/dL 9.8* 9.0* 11.4*   HEMATOCRIT % 30.6* 28.8* 35.1*   PLATELETS 10*3/mm3 205 181 209       IMAGING STUDIES:  No new imaging    I personally viewed and interpreted the patient's chart.    Meds reviewed/changed: Yes  Tylenol 650 mg p.o. every 4 hours  Gabapentin 800 mg p.o. 3 times daily  Robaxin 500 mg p.o. 4 times daily  Roxicodone IR 10 mg p.o. every 4 as needed- 5 doses    Physical Exam:    General:   Awake, alert.  Up in chair.  Pleasant and cooperative.  No acute distress  Back:    Incision midline lumbar with well approximated staples.  Dressing with small amount of serosanguineous drainage.  PATRICK x2 with minimal serosanguineous output.  There is some mild erythema surrounding the left PATRICK site.  Abdomen:   Bowel sounds normal x 4 quads. NT/ND   Motor:   Normal muscle strength, bulk and tone in lower extremities.  No fasciculations, rigidity, spasticity, or abnormal movements.  Sensation: Normal to light touch chin LEs  Station and Gait:             Patient ambulating CGA x1 with rolling walker.  Extremities:   No calf swelling or tenderness.      Assessment & Plan     ASSESSMENT:       "Spondylolisthesis, lumbar region    Lumbar radiculopathy    Synovial cyst of lumbar facet joint    Patient postop day 3 from two-level lumbar decompression and multilevel lumbosacral fusion.  Reports no back pain at present.  Has some discomfort in the right anterior thigh which is chronic.  He is frequently ambulating with staff.  He developed a fever overnight but his white blood cell count is normal.  He has a Dodge catheter in place due to difficulty with placement, placed by urology intraoperatively.  Will check urinalysis, procalcitonin, lactate.  Continue to encourage I-S which she is doing fairly well.  He is on DVT prophylaxis.  I will DC his scheduled Tylenol to prevent masking of fever.  I am going to change him to Percocet as needed as he reports good control of his back pain and his blood pressure remains somewhat low although it has been that way since postop.  He denies dizziness.  For now keep JPs, but will discuss with Dr. Blas.  Consider discontinuing today.    PLAN:   Keep PATRICK x2  Procal, lactate, UA  Encourage I-S and frequent ambulation  Monitor temp  Repeat CBC AM  DC scheduled Tylenol and Roxicodone  Percocet 7.5 every 4 as needed  Continue Dodge    I discussed the patient's findings and my recommendations with patient and Dr. Blas    During patient visit, I utilized appropriate personal protective equipment including mask and gloves.  Mask used was standard procedure mask. Appropriate PPE was worn during the entire visit.  Hand hygiene was completed before and after.      LOS: 3 days       Shruti Mcneil, APRN  3/2/2023  11:56 EST    \"Dictated utilizing Dragon dictation\".      "

## 2023-03-02 NOTE — PLAN OF CARE
Goal Outcome Evaluation:           Progress: no change  Outcome Evaluation: POD3 L3-L5 lumbar fusion w/ L3-S2 instrumentation. Pt A&Ox4, VSS, on 2L NC or CPAP, voiding per jean baptiste an leaving with jean baptiste, pain controlled with prn oral pain medication. Pt assist x1 to bathroom. Pt neuro checks WNL, 2 JPs in place with minimum output. Dressing c/d/i. Pt plan is possible d/c this AM. Will CTM

## 2023-03-03 ENCOUNTER — APPOINTMENT (OUTPATIENT)
Dept: GENERAL RADIOLOGY | Facility: HOSPITAL | Age: 78
DRG: 454 | End: 2023-03-03
Payer: OTHER GOVERNMENT

## 2023-03-03 LAB
ANION GAP SERPL CALCULATED.3IONS-SCNC: 11.1 MMOL/L (ref 5–15)
B PARAPERT DNA SPEC QL NAA+PROBE: NOT DETECTED
B PERT DNA SPEC QL NAA+PROBE: NOT DETECTED
BACTERIA SPEC AEROBE CULT: NO GROWTH
BASOPHILS # BLD AUTO: 0.02 10*3/MM3 (ref 0–0.2)
BASOPHILS NFR BLD AUTO: 0.3 % (ref 0–1.5)
BUN SERPL-MCNC: 24 MG/DL (ref 8–23)
BUN/CREAT SERPL: 18.5 (ref 7–25)
C PNEUM DNA NPH QL NAA+NON-PROBE: NOT DETECTED
CALCIUM SPEC-SCNC: 9.1 MG/DL (ref 8.6–10.5)
CHLORIDE SERPL-SCNC: 101 MMOL/L (ref 98–107)
CO2 SERPL-SCNC: 22.9 MMOL/L (ref 22–29)
CREAT SERPL-MCNC: 1.3 MG/DL (ref 0.76–1.27)
D-LACTATE SERPL-SCNC: 0.9 MMOL/L (ref 0.5–2)
D-LACTATE SERPL-SCNC: 3.3 MMOL/L (ref 0.5–2)
DEPRECATED RDW RBC AUTO: 44.7 FL (ref 37–54)
EGFRCR SERPLBLD CKD-EPI 2021: 56.9 ML/MIN/1.73
EOSINOPHIL # BLD AUTO: 0.1 10*3/MM3 (ref 0–0.4)
EOSINOPHIL NFR BLD AUTO: 1.7 % (ref 0.3–6.2)
ERYTHROCYTE [DISTWIDTH] IN BLOOD BY AUTOMATED COUNT: 12.5 % (ref 12.3–15.4)
FLUAV SUBTYP SPEC NAA+PROBE: NOT DETECTED
FLUBV RNA ISLT QL NAA+PROBE: NOT DETECTED
GLUCOSE SERPL-MCNC: 140 MG/DL (ref 65–99)
HADV DNA SPEC NAA+PROBE: NOT DETECTED
HCOV 229E RNA SPEC QL NAA+PROBE: NOT DETECTED
HCOV HKU1 RNA SPEC QL NAA+PROBE: NOT DETECTED
HCOV NL63 RNA SPEC QL NAA+PROBE: NOT DETECTED
HCOV OC43 RNA SPEC QL NAA+PROBE: NOT DETECTED
HCT VFR BLD AUTO: 32 % (ref 37.5–51)
HGB BLD-MCNC: 10.1 G/DL (ref 13–17.7)
HMPV RNA NPH QL NAA+NON-PROBE: NOT DETECTED
HOLD SPECIMEN: NORMAL
HPIV1 RNA ISLT QL NAA+PROBE: NOT DETECTED
HPIV2 RNA SPEC QL NAA+PROBE: NOT DETECTED
HPIV3 RNA NPH QL NAA+PROBE: NOT DETECTED
HPIV4 P GENE NPH QL NAA+PROBE: NOT DETECTED
IMM GRANULOCYTES # BLD AUTO: 0.03 10*3/MM3 (ref 0–0.05)
IMM GRANULOCYTES NFR BLD AUTO: 0.5 % (ref 0–0.5)
LYMPHOCYTES # BLD AUTO: 1.11 10*3/MM3 (ref 0.7–3.1)
LYMPHOCYTES NFR BLD AUTO: 18.9 % (ref 19.6–45.3)
M PNEUMO IGG SER IA-ACNC: NOT DETECTED
MCH RBC QN AUTO: 31.2 PG (ref 26.6–33)
MCHC RBC AUTO-ENTMCNC: 31.6 G/DL (ref 31.5–35.7)
MCV RBC AUTO: 98.8 FL (ref 79–97)
MONOCYTES # BLD AUTO: 0.6 10*3/MM3 (ref 0.1–0.9)
MONOCYTES NFR BLD AUTO: 10.2 % (ref 5–12)
NEUTROPHILS NFR BLD AUTO: 4 10*3/MM3 (ref 1.7–7)
NEUTROPHILS NFR BLD AUTO: 68.4 % (ref 42.7–76)
NRBC BLD AUTO-RTO: 0 /100 WBC (ref 0–0.2)
PLATELET # BLD AUTO: 224 10*3/MM3 (ref 140–450)
PMV BLD AUTO: 10.8 FL (ref 6–12)
POTASSIUM SERPL-SCNC: 4.6 MMOL/L (ref 3.5–5.2)
PROCALCITONIN SERPL-MCNC: 0.28 NG/ML (ref 0–0.25)
RBC # BLD AUTO: 3.24 10*6/MM3 (ref 4.14–5.8)
RHINOVIRUS RNA SPEC NAA+PROBE: NOT DETECTED
RSV RNA NPH QL NAA+NON-PROBE: NOT DETECTED
SARS-COV-2 RNA NPH QL NAA+NON-PROBE: NOT DETECTED
SODIUM SERPL-SCNC: 135 MMOL/L (ref 136–145)
WBC NRBC COR # BLD: 5.86 10*3/MM3 (ref 3.4–10.8)
WHOLE BLOOD HOLD SPECIMEN: NORMAL

## 2023-03-03 PROCEDURE — 84145 PROCALCITONIN (PCT): CPT | Performed by: NEUROLOGICAL SURGERY

## 2023-03-03 PROCEDURE — 25010000002 VANCOMYCIN 10 G RECONSTITUTED SOLUTION: Performed by: NEUROLOGICAL SURGERY

## 2023-03-03 PROCEDURE — 25010000002 CEFTRIAXONE PER 250 MG: Performed by: NEUROLOGICAL SURGERY

## 2023-03-03 PROCEDURE — 71045 X-RAY EXAM CHEST 1 VIEW: CPT

## 2023-03-03 PROCEDURE — 0202U NFCT DS 22 TRGT SARS-COV-2: CPT | Performed by: INTERNAL MEDICINE

## 2023-03-03 PROCEDURE — 80048 BASIC METABOLIC PNL TOTAL CA: CPT | Performed by: NURSE PRACTITIONER

## 2023-03-03 PROCEDURE — 85025 COMPLETE CBC W/AUTO DIFF WBC: CPT | Performed by: NEUROLOGICAL SURGERY

## 2023-03-03 PROCEDURE — 99024 POSTOP FOLLOW-UP VISIT: CPT | Performed by: NURSE PRACTITIONER

## 2023-03-03 PROCEDURE — 99223 1ST HOSP IP/OBS HIGH 75: CPT | Performed by: INTERNAL MEDICINE

## 2023-03-03 PROCEDURE — 83605 ASSAY OF LACTIC ACID: CPT | Performed by: NEUROLOGICAL SURGERY

## 2023-03-03 PROCEDURE — 25010000002 ENOXAPARIN PER 10 MG: Performed by: NEUROLOGICAL SURGERY

## 2023-03-03 RX ORDER — VANCOMYCIN 2 GRAM/500 ML IN 0.9 % SODIUM CHLORIDE INTRAVENOUS
2000 ONCE
Status: COMPLETED | OUTPATIENT
Start: 2023-03-03 | End: 2023-03-03

## 2023-03-03 RX ORDER — VANCOMYCIN HYDROCHLORIDE 1 G/200ML
1000 INJECTION, SOLUTION INTRAVENOUS EVERY 24 HOURS
Status: DISCONTINUED | OUTPATIENT
Start: 2023-03-04 | End: 2023-03-05

## 2023-03-03 RX ADMIN — FERROUS SULFATE TAB 325 MG (65 MG ELEMENTAL FE) 325 MG: 325 (65 FE) TAB at 08:36

## 2023-03-03 RX ADMIN — ATORVASTATIN CALCIUM 10 MG: 20 TABLET, FILM COATED ORAL at 08:35

## 2023-03-03 RX ADMIN — VANCOMYCIN HYDROCHLORIDE 2000 MG: 10 INJECTION, POWDER, LYOPHILIZED, FOR SOLUTION INTRAVENOUS at 11:57

## 2023-03-03 RX ADMIN — ENOXAPARIN SODIUM 40 MG: 100 INJECTION SUBCUTANEOUS at 08:35

## 2023-03-03 RX ADMIN — OXYCODONE HYDROCHLORIDE AND ACETAMINOPHEN 1 TABLET: 7.5; 325 TABLET ORAL at 00:34

## 2023-03-03 RX ADMIN — METHOCARBAMOL TABLETS 500 MG: 500 TABLET, COATED ORAL at 20:33

## 2023-03-03 RX ADMIN — METHOCARBAMOL TABLETS 500 MG: 500 TABLET, COATED ORAL at 11:58

## 2023-03-03 RX ADMIN — FINASTERIDE 5 MG: 5 TABLET, FILM COATED ORAL at 08:35

## 2023-03-03 RX ADMIN — GABAPENTIN 800 MG: 400 CAPSULE ORAL at 20:33

## 2023-03-03 RX ADMIN — Medication 10 ML: at 08:36

## 2023-03-03 RX ADMIN — Medication 10 ML: at 20:33

## 2023-03-03 RX ADMIN — OXYCODONE HYDROCHLORIDE AND ACETAMINOPHEN 1 TABLET: 7.5; 325 TABLET ORAL at 10:34

## 2023-03-03 RX ADMIN — METHOCARBAMOL TABLETS 500 MG: 500 TABLET, COATED ORAL at 18:22

## 2023-03-03 RX ADMIN — DOCUSATE SODIUM 50MG AND SENNOSIDES 8.6MG 1 TABLET: 8.6; 5 TABLET, FILM COATED ORAL at 20:33

## 2023-03-03 RX ADMIN — DOCUSATE SODIUM 50MG AND SENNOSIDES 8.6MG 1 TABLET: 8.6; 5 TABLET, FILM COATED ORAL at 08:36

## 2023-03-03 RX ADMIN — GABAPENTIN 800 MG: 400 CAPSULE ORAL at 15:33

## 2023-03-03 RX ADMIN — ACETAMINOPHEN 650 MG: 325 TABLET, FILM COATED ORAL at 00:27

## 2023-03-03 RX ADMIN — CEFTRIAXONE 2 G: 2 INJECTION, POWDER, FOR SOLUTION INTRAMUSCULAR; INTRAVENOUS at 10:24

## 2023-03-03 RX ADMIN — GLIPIZIDE 5 MG: 5 TABLET ORAL at 18:22

## 2023-03-03 RX ADMIN — METHOCARBAMOL TABLETS 500 MG: 500 TABLET, COATED ORAL at 08:35

## 2023-03-03 RX ADMIN — GLIPIZIDE 5 MG: 5 TABLET ORAL at 06:33

## 2023-03-03 RX ADMIN — FERROUS SULFATE TAB 325 MG (65 MG ELEMENTAL FE) 325 MG: 325 (65 FE) TAB at 20:33

## 2023-03-03 RX ADMIN — PANTOPRAZOLE SODIUM 40 MG: 40 TABLET, DELAYED RELEASE ORAL at 06:33

## 2023-03-03 RX ADMIN — Medication 500 MCG: at 08:35

## 2023-03-03 RX ADMIN — GABAPENTIN 800 MG: 400 CAPSULE ORAL at 08:36

## 2023-03-03 RX ADMIN — ACETAMINOPHEN 650 MG: 325 TABLET, FILM COATED ORAL at 15:33

## 2023-03-03 NOTE — CONSULTS
Referring Provider: Indio Blas MD  [address] [place] [id number]  Reason for Consultation: Fever    Subjective   History of present illness: This is a 77-year-old male with history of hypertension diabetes, hypercholesterolemia, history of kidney stones, and a history of urethral stricture requiring as needed catheterization who was admitted on February 27 for surgical intervention on his right-sided L4 and L5 radiculopathy with spondylolisthesis.  The patient underwent L3-4, L4-5 transforaminal lumbar interbody fusion with L3-4, 4-5, L5-S1, S1-2 instrumentation with robotic assistance.  Intraoperatively nursing staff was unable to place a Dodge catheter and urology was consulted.  He underwent cystoscopy with urethral dilatation and Dodge catheter placement on February 27.  This operatively the patient did well but developed a fever of 103.2 on March 2.  He was empirically started on vancomycin and ceftriaxone.  Currently the patient states his postoperative pain is well controlled.  When he had the fever last night he also experienced chills.  He denies a sore throat rhinorrhea cough or shortness of breath.  No abdominal pain nausea vomiting or diarrhea.  No rashes or skin lesions.  Dodge catheter is anchored    Note the patient is status post left knee replacement.  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    Past Medical History:   Diagnosis Date   • Arthritis    • BPH (benign prostatic hyperplasia)    • Diabetes mellitus (HCC)    • GERD (gastroesophageal reflux disease)    • High cholesterol    • History of bladder cancer    • History of hepatitis C     treated medically   • History of kidney stones    • Hypertension    • Low back pain    • Psoriasis    • Sleep apnea    • Urethral stricture     caths self prn       Past Surgical History:   Procedure Laterality Date   • ANKLE SURGERY Right     orif with hardware   • CARPAL TUNNEL RELEASE Left     • ENDOSCOPY     • GASTRIC BYPASS      2008   • HAND SURGERY Right     thumb    • KNEE ARTHROPLASTY Left    • LUMBAR FUSION N/A 2/27/2023   • ORIF WRIST FRACTURE Left    • TOTAL SHOULDER ARTHROPLASTY W/ DISTAL CLAVICLE EXCISION Right 01/18/2018   • TOTAL SHOULDER ARTHROPLASTY W/ DISTAL CLAVICLE EXCISION Left 06/28/2018        reports that he quit smoking about 28 years ago. His smoking use included cigarettes. He has never used smokeless tobacco. He reports current alcohol use. He reports that he does not use drugs.    family history is not on file.    Allergies   Allergen Reactions   • Sulfa Antibiotics Hives       Medication:  Antibiotics:  Vancomycin dose per pharmacy  Ceftriaxone 2 g IV every 12 hours    Please refer to the medical record for a full medication list    Review of Systems  Pertinent items are noted in HPI, all other systems reviewed and negative    Objective   Vital Signs   Temp:  [97.6 °F (36.4 °C)-103.2 °F (39.6 °C)] 97.8 °F (36.6 °C)  Heart Rate:  [75-94] 75  Resp:  [16-17] 16  BP: (102-116)/(59-78) 116/69    Physical Exam:   General: In no acute distress  HEENT: Normocephalic, atraumatic,  no scleral icterus.   Neck: Supple, trachea is midline  Cardiovascular: Normal rate, regular rhythm, normal S1 and S2, no murmurs, rubs, or gallops   Respiratory: Lungs are clear to auscultation bilaterally, no wheezing  GI: Abdomen is soft, nontender, nondistended, positive bowel sounds bilaterally  Musculoskeletal:no edema, tenderness or deformity  Skin: No rashes lumbar surgical incision covered in dressing drain in place  Extremities: No E/C/C  Neurological: Alert and oriented, moving all 4 extremities  Psychiatric: Normal mood and affect     Results Review:   I reviewed the patient's new clinical results.  I reviewed the patient's new imaging results and agree with the interpretation.    Lab Results   Component Value Date    WBC 5.86 03/03/2023    HGB 10.1 (L) 03/03/2023    HCT 32.0 (L) 03/03/2023     MCV 98.8 (H) 03/03/2023     03/03/2023       Lab Results   Component Value Date    GLUCOSE 140 (H) 03/03/2023    BUN 24 (H) 03/03/2023    CREATININE 1.30 (H) 03/03/2023    EGFRIFNONA 74 06/21/2018    EGFRIFAFRI 90 06/21/2018    BCR 18.5 03/03/2023    CO2 22.9 03/03/2023    CALCIUM 9.1 03/03/2023    ALBUMIN 3.7 02/24/2023    AST 32 02/24/2023    ALT 47 (H) 02/24/2023     Procal 0.27 -> 0.28  Urinalysis 13-20 white blood cells no bacteria seen    Microbiology:  3/2 BCx P x 2  3/2 UCx P     Radiology:  X-ray of the lumbar spine shows pedicle screws at L3-4-5 and S1 additional screws through the sacrum and sacroiliac joints.  Vertical fixation rods in place.  No indication for hardware loosening or malalignment.  Multilevel degenerative disc disease    Assessment & Plan   Fever  CKD #  Radiculopathy with spondylolisthesis s/p underwent L3-4, L4-5 transforaminal lumbar interbody fusion with L3-4, 4-5, L5-S1, S1-2 instrumentation with robotic assistance on 2/27  Urinary retention requiring Dodge catheter placement    The patient does not have any signs or symptoms consistent with a respiratory or GI illness.  This would be too early for surgical site infection.  Certainly given  instrumentation there is concern for possible bacterial translocation.  He only has mild pyuria.  Await urine culture results.  Blood cultures have also been obtained.  For now continue empiric vancomycin dosing per pharmacy as well as ceftriaxone but decrease the dose to 2 g IV every 24 hours.    We will continue to follow with    I discussed the patient's findings and my recommendations with patient and nursing staff

## 2023-03-03 NOTE — PLAN OF CARE
Goal Outcome Evaluation:  Plan of Care Reviewed With: patient        Progress: no change  Pt had elevated temperature and notified on call. Stat lab orders done. Calls made for [facility] and Infectious dse consult. Cont voiding per f/c and care done. Educated on bp monitoring and medication management.

## 2023-03-03 NOTE — NURSING NOTE
RN placed call to urologist regarding patient possibly having a UTI and if they can come see patient again.

## 2023-03-03 NOTE — PLAN OF CARE
Goal Outcome Evaluation:  Plan of Care Reviewed With: patient        Progress: improving  Outcome Evaluation: PT IS POD #3 FROM A L3-S2 FUSION WITH INSTRUMENTATION TO L3-5. PAIN WELL CONTROLLED WITH PO REGIMEN. AMBULATES WITH ASSIST OF ONE AND A WALKER. CONTINUES TO BE PERIODICALLY FEBRILE. UA NOTED WITH BACTERIA AND ABT STARTED. PT EDUCATED REGARDING SLEEP APNEA AND COMPLIANCE.

## 2023-03-03 NOTE — CONSULTS
Patient Name:  Jermaine Denis  YOB: 1945  MRN:  [id number]  Date of Admission:  2/27/2023  Date of Consult:  3/3/2023  Patient Care Team:  Linh Mckeon MD as PCP - General (Internal Medicine)    Infectious disease consulted for postoperative fever that is likely  source.  Urology is already consulted and following as well.  Discussed with GRANT Carreon NES that no additional assistance is needed from A at this time.  [facility] will be available as needed.     GRANT Horne  [facility]  03/03/23  11:15 EST

## 2023-03-03 NOTE — PROGRESS NOTES
"[facility] Clinical Pharmacy Services: Vancomycin Pharmacokinetic Initial Consult Note    Jermaine Denis is a 77 y.o. male who is on day 1 of pharmacy to dose vancomycin.    Indication: Bone and/or Joint Infection  Consulting Provider: Roopa  Planned Duration of Therapy: 14 days  Loading Dose Ordered or Given: 2000 mg on 3/3/2023 at 1100  MRSA PCR performed: no  Culture/Source: Blood and urine: pending  Target: -600 mg/L.hr   Other Antimicrobials: rocephin    Vitals/Labs  Ht: 162.6 cm (64.02\"); Wt: 108 kg (237 lb 10.5 oz)  Temp Readings from Last 1 Encounters:   03/03/23 97.8 °F (36.6 °C) (Oral)    Estimated Creatinine Clearance: 53.8 mL/min (A) (by C-G formula based on SCr of 1.3 mg/dL (H)).       Results from last 7 days   Lab Units 03/03/23  0105 03/02/23  0929 03/01/23  0315 02/24/23  1257   CREATININE mg/dL 1.30*  --   --  1.03   WBC 10*3/mm3 5.86 5.67 7.59 4.85     Assessment/Plan:    Vancomycin Dose: Will start a 1000 mg IV every 24 hours scheduled regimen at 1100 3/4/23  Predictive AUC level for the dose ordered is 481 mg/L.hr, which is within the target of 400-600 mg/L.hr  Vanc Trough has been ordered for 3/3/23 at 1030     Pharmacy will follow patient's kidney function and will adjust doses and obtain levels as necessary. Thank you for involving pharmacy in this patient's care. Please contact pharmacy with any questions or concerns.                           Jermaine Gonzalez, [facility]  Clinical Pharmacist   "

## 2023-03-03 NOTE — PROGRESS NOTES
[employer] THORACIC/LUMBAR NEUROSURGERY POSTOP NOTE      CC: POD 4 L3-5 decompression/TLIF with L3-S2 instrumentation      Subjective     Interval History: Fever spike of 103 overnight.  Denies headache.  Reports back still sore but not worse today.  Reports some sore area in his right anterior thigh that predates his surgery and persist.  Eating some.  Passing flatus and having small BMs.  Denies cough, shortness of air, sore throat.    Objective     Vital signs in last 24 hours:  Temp:  [97.6 °F (36.4 °C)-103.2 °F (39.6 °C)] 100.1 °F (37.8 °C)  Heart Rate:  [75-94] 84  Resp:  [16-17] 17  BP: (102-128)/(59-78) 128/74    Intake/Output this shift:  I/O this shift:  In: 360 [P.O.:360]  Out: -     PATRICK 1 left- 105 cc x 24 hours 45 x 12 hours  PATRICK to right-195 cc x 24 hours-155 x 12 hours    LABS:  Results from last 7 days   Lab Units 03/03/23  0105 03/02/23  0929 03/01/23  0315   WBC 10*3/mm3 5.86 5.67 7.59   HEMOGLOBIN g/dL 10.1* 9.8* 9.0*   HEMATOCRIT % 32.0* 30.6* 28.8*   PLATELETS 10*3/mm3 224 205 181     Respiratory PCR-pending  Procalcitonin 3/2-0.27   procalcitonin 3/3- 0.28  Lactate 3/2- 1.3  Lactate 3/3  0100-3.3  Lactate 3/3  0400-0 0.9  Blood culture x2 3/2-pending  Urine culture 3/2-pending      IMAGING STUDIES:  No new imaging    I personally viewed and interpreted the patient's chart.    Meds reviewed/changed: Yes  Tylenol 650 mg p.o. every 4 hours as needed- 2 doses  Gabapentin 800 mg p.o. 3 times daily  Robaxin 500 mg p.o. 4 times daily  Percocet 7.5 every 4 as needed 3 doses    Physical Exam:    General:   Awake, alert.  Up in chair.  Pleasant and cooperative.  No acute distress  Back:    Midline lumbar incision with CDI dressing.  PATRICK site x2 with CDI dressing.  PATRICK on the right with serous blood-tinged output, PATRICK on left with small amount of cherry color output.  Abdomen:   Bowel sounds normal x 4 quads. NT/ND   Motor:   Normal muscle strength, bulk and tone in lower extremities.   Station and Gait:              Patient ambulating CGA x1 with rolling walker.  Extremities:   No calf swelling or tenderness.  Mild tenderness right anterior medial distal thigh.  No redness or palpable abnormality.      Assessment & Plan     ASSESSMENT:      Spondylolisthesis, lumbar region    Lumbar radiculopathy    Synovial cyst of lumbar facet joint    History of hepatitis C    BPH (benign prostatic hyperplasia)    Diabetes mellitus (HCC)    Neuropathy    Patient now 4 days status post two-level lumbar decompression and multilevel lumbosacral fusion.  States he has some back soreness but it is not worsened than any other day since surgery.  He had a fever yesterday with unremarkable lactate but slightly elevated procalcitonin.  Urinalysis abnormal and he had had a Dodge catheter placed in OR.  We started him on some Rocephin.  Overnight his fever spiked to 103.  Repeat lactate elevated but then resolved 3 hours later without specific intervention.  Procalcitonin remains stable and white blood cell count normal.  Fever curve better this morning.  Respiratory panel negative.  He denies any headache or neck pain.  He still has some soreness in his right anterior thigh which predates surgery.  His incision site looked fine yesterday and there is no drainage on the dressing today.  JPs show a bit more output today.  Dr. Blas would like them to remain in place for now.  [facility] consulted overnight for assist and they have deferred to ID service who's consult is pending.  Still awaiting urology eval as well.  Hopefully we can get him turned around and home in the next few days.  I continue to encourage him on the use of his incentive spirometer and frequent ambulation.      PLAN:   Continue Rocephin  Add vancomycin  Keep PATRICK x2  Encourage I-S and frequent ambulation  Continue Dodge  Await ID and urology input    I discussed the patient's findings and my recommendations with patient, nursing staff and Dr. Blas    During entirety of patient visit, I  "utilized appropriate PPE. While in the room and during my examination of the patient I wore gloves, gown, mask, eye protection and followed enhanced droplet/contact isolation protocol and precautions.  Hand hygiene was completed before and after.this patient encounter.       LOS: 0 days       Shruti Mcneil, APRN  3/3/2023  11:24 EST    \"Dictated utilizing Dragon dictation\".      "

## 2023-03-04 ENCOUNTER — APPOINTMENT (OUTPATIENT)
Dept: MRI IMAGING | Facility: HOSPITAL | Age: 78
DRG: 454 | End: 2023-03-04
Payer: OTHER GOVERNMENT

## 2023-03-04 ENCOUNTER — APPOINTMENT (OUTPATIENT)
Dept: CARDIOLOGY | Facility: HOSPITAL | Age: 78
DRG: 454 | End: 2023-03-04
Payer: OTHER GOVERNMENT

## 2023-03-04 LAB
ALBUMIN SERPL-MCNC: 2.8 G/DL (ref 3.5–5.2)
ALBUMIN/GLOB SERPL: 1.3 G/DL
ALP SERPL-CCNC: 53 U/L (ref 39–117)
ALT SERPL W P-5'-P-CCNC: 39 U/L (ref 1–41)
ANION GAP SERPL CALCULATED.3IONS-SCNC: 4.6 MMOL/L (ref 5–15)
AST SERPL-CCNC: 59 U/L (ref 1–40)
BASOPHILS # BLD AUTO: 0.01 10*3/MM3 (ref 0–0.2)
BASOPHILS NFR BLD AUTO: 0.3 % (ref 0–1.5)
BH CV LOWER VASCULAR LEFT COMMON FEMORAL AUGMENT: NORMAL
BH CV LOWER VASCULAR LEFT COMMON FEMORAL COMPETENT: NORMAL
BH CV LOWER VASCULAR LEFT COMMON FEMORAL COMPRESS: NORMAL
BH CV LOWER VASCULAR LEFT COMMON FEMORAL PHASIC: NORMAL
BH CV LOWER VASCULAR LEFT COMMON FEMORAL SPONT: NORMAL
BH CV LOWER VASCULAR LEFT DISTAL FEMORAL COMPRESS: NORMAL
BH CV LOWER VASCULAR LEFT GASTRONEMIUS COMPRESS: NORMAL
BH CV LOWER VASCULAR LEFT GREATER SAPH AK COMPRESS: NORMAL
BH CV LOWER VASCULAR LEFT GREATER SAPH BK COMPRESS: NORMAL
BH CV LOWER VASCULAR LEFT LESSER SAPH COMPRESS: NORMAL
BH CV LOWER VASCULAR LEFT MID FEMORAL AUGMENT: NORMAL
BH CV LOWER VASCULAR LEFT MID FEMORAL COMPETENT: NORMAL
BH CV LOWER VASCULAR LEFT MID FEMORAL COMPRESS: NORMAL
BH CV LOWER VASCULAR LEFT MID FEMORAL PHASIC: NORMAL
BH CV LOWER VASCULAR LEFT MID FEMORAL SPONT: NORMAL
BH CV LOWER VASCULAR LEFT PERONEAL COMPRESS: NORMAL
BH CV LOWER VASCULAR LEFT POPLITEAL AUGMENT: NORMAL
BH CV LOWER VASCULAR LEFT POPLITEAL COMPETENT: NORMAL
BH CV LOWER VASCULAR LEFT POPLITEAL COMPRESS: NORMAL
BH CV LOWER VASCULAR LEFT POPLITEAL PHASIC: NORMAL
BH CV LOWER VASCULAR LEFT POPLITEAL SPONT: NORMAL
BH CV LOWER VASCULAR LEFT POSTERIOR TIBIAL COMPRESS: NORMAL
BH CV LOWER VASCULAR LEFT PROFUNDA FEMORAL COMPRESS: NORMAL
BH CV LOWER VASCULAR LEFT PROXIMAL FEMORAL COMPRESS: NORMAL
BH CV LOWER VASCULAR LEFT SAPHENOFEMORAL JUNCTION COMPRESS: NORMAL
BH CV LOWER VASCULAR RIGHT COMMON FEMORAL AUGMENT: NORMAL
BH CV LOWER VASCULAR RIGHT COMMON FEMORAL COMPETENT: NORMAL
BH CV LOWER VASCULAR RIGHT COMMON FEMORAL COMPRESS: NORMAL
BH CV LOWER VASCULAR RIGHT COMMON FEMORAL PHASIC: NORMAL
BH CV LOWER VASCULAR RIGHT COMMON FEMORAL SPONT: NORMAL
BH CV LOWER VASCULAR RIGHT DISTAL FEMORAL COMPRESS: NORMAL
BH CV LOWER VASCULAR RIGHT GASTRONEMIUS COMPRESS: NORMAL
BH CV LOWER VASCULAR RIGHT GREATER SAPH AK COMPRESS: NORMAL
BH CV LOWER VASCULAR RIGHT GREATER SAPH BK COMPRESS: NORMAL
BH CV LOWER VASCULAR RIGHT LESSER SAPH COMPRESS: NORMAL
BH CV LOWER VASCULAR RIGHT MID FEMORAL AUGMENT: NORMAL
BH CV LOWER VASCULAR RIGHT MID FEMORAL COMPETENT: NORMAL
BH CV LOWER VASCULAR RIGHT MID FEMORAL COMPRESS: NORMAL
BH CV LOWER VASCULAR RIGHT MID FEMORAL PHASIC: NORMAL
BH CV LOWER VASCULAR RIGHT MID FEMORAL SPONT: NORMAL
BH CV LOWER VASCULAR RIGHT PERONEAL COMPRESS: NORMAL
BH CV LOWER VASCULAR RIGHT POPLITEAL AUGMENT: NORMAL
BH CV LOWER VASCULAR RIGHT POPLITEAL COMPETENT: NORMAL
BH CV LOWER VASCULAR RIGHT POPLITEAL COMPRESS: NORMAL
BH CV LOWER VASCULAR RIGHT POPLITEAL PHASIC: NORMAL
BH CV LOWER VASCULAR RIGHT POPLITEAL SPONT: NORMAL
BH CV LOWER VASCULAR RIGHT POSTERIOR TIBIAL COMPRESS: NORMAL
BH CV LOWER VASCULAR RIGHT PROFUNDA FEMORAL COMPRESS: NORMAL
BH CV LOWER VASCULAR RIGHT PROXIMAL FEMORAL COMPRESS: NORMAL
BH CV LOWER VASCULAR RIGHT SAPHENOFEMORAL JUNCTION COMPRESS: NORMAL
BILIRUB SERPL-MCNC: 0.2 MG/DL (ref 0–1.2)
BUN SERPL-MCNC: 17 MG/DL (ref 8–23)
BUN/CREAT SERPL: 17.5 (ref 7–25)
CALCIUM SPEC-SCNC: 8.6 MG/DL (ref 8.6–10.5)
CHLORIDE SERPL-SCNC: 103 MMOL/L (ref 98–107)
CO2 SERPL-SCNC: 27.4 MMOL/L (ref 22–29)
CREAT SERPL-MCNC: 0.97 MG/DL (ref 0.76–1.27)
DEPRECATED RDW RBC AUTO: 42.2 FL (ref 37–54)
EGFRCR SERPLBLD CKD-EPI 2021: 80.9 ML/MIN/1.73
EOSINOPHIL # BLD AUTO: 0.11 10*3/MM3 (ref 0–0.4)
EOSINOPHIL NFR BLD AUTO: 3.1 % (ref 0.3–6.2)
ERYTHROCYTE [DISTWIDTH] IN BLOOD BY AUTOMATED COUNT: 12.3 % (ref 12.3–15.4)
GLOBULIN UR ELPH-MCNC: 2.2 GM/DL
GLUCOSE SERPL-MCNC: 117 MG/DL (ref 65–99)
HCT VFR BLD AUTO: 26 % (ref 37.5–51)
HGB BLD-MCNC: 8.7 G/DL (ref 13–17.7)
IMM GRANULOCYTES # BLD AUTO: 0.02 10*3/MM3 (ref 0–0.05)
IMM GRANULOCYTES NFR BLD AUTO: 0.6 % (ref 0–0.5)
LYMPHOCYTES # BLD AUTO: 0.7 10*3/MM3 (ref 0.7–3.1)
LYMPHOCYTES NFR BLD AUTO: 19.5 % (ref 19.6–45.3)
MAXIMAL PREDICTED HEART RATE: 144 BPM
MCH RBC QN AUTO: 32.2 PG (ref 26.6–33)
MCHC RBC AUTO-ENTMCNC: 33.5 G/DL (ref 31.5–35.7)
MCV RBC AUTO: 96.3 FL (ref 79–97)
MONOCYTES # BLD AUTO: 0.54 10*3/MM3 (ref 0.1–0.9)
MONOCYTES NFR BLD AUTO: 15 % (ref 5–12)
NEUTROPHILS NFR BLD AUTO: 2.21 10*3/MM3 (ref 1.7–7)
NEUTROPHILS NFR BLD AUTO: 61.5 % (ref 42.7–76)
NRBC BLD AUTO-RTO: 0 /100 WBC (ref 0–0.2)
PLATELET # BLD AUTO: 162 10*3/MM3 (ref 140–450)
PMV BLD AUTO: 10.5 FL (ref 6–12)
POTASSIUM SERPL-SCNC: 4.2 MMOL/L (ref 3.5–5.2)
PROT SERPL-MCNC: 5 G/DL (ref 6–8.5)
RBC # BLD AUTO: 2.7 10*6/MM3 (ref 4.14–5.8)
SODIUM SERPL-SCNC: 135 MMOL/L (ref 136–145)
STRESS TARGET HR: 122 BPM
WBC NRBC COR # BLD: 3.59 10*3/MM3 (ref 3.4–10.8)

## 2023-03-04 PROCEDURE — 25010000002 CEFTRIAXONE PER 250 MG: Performed by: INTERNAL MEDICINE

## 2023-03-04 PROCEDURE — 99024 POSTOP FOLLOW-UP VISIT: CPT | Performed by: NURSE PRACTITIONER

## 2023-03-04 PROCEDURE — 25010000002 HYDROMORPHONE PER 4 MG: Performed by: NEUROLOGICAL SURGERY

## 2023-03-04 PROCEDURE — 80053 COMPREHEN METABOLIC PANEL: CPT | Performed by: NURSE PRACTITIONER

## 2023-03-04 PROCEDURE — 0 GADOBENATE DIMEGLUMINE 529 MG/ML SOLUTION: Performed by: NEUROLOGICAL SURGERY

## 2023-03-04 PROCEDURE — 72158 MRI LUMBAR SPINE W/O & W/DYE: CPT

## 2023-03-04 PROCEDURE — A9577 INJ MULTIHANCE: HCPCS | Performed by: NEUROLOGICAL SURGERY

## 2023-03-04 PROCEDURE — 25010000002 VANCOMYCIN PER 500 MG: Performed by: NEUROLOGICAL SURGERY

## 2023-03-04 PROCEDURE — 93970 EXTREMITY STUDY: CPT

## 2023-03-04 PROCEDURE — 25010000002 ENOXAPARIN PER 10 MG: Performed by: NEUROLOGICAL SURGERY

## 2023-03-04 PROCEDURE — 85025 COMPLETE CBC W/AUTO DIFF WBC: CPT | Performed by: NURSE PRACTITIONER

## 2023-03-04 RX ADMIN — GLIPIZIDE 5 MG: 5 TABLET ORAL at 18:02

## 2023-03-04 RX ADMIN — Medication 10 ML: at 21:30

## 2023-03-04 RX ADMIN — ENOXAPARIN SODIUM 40 MG: 100 INJECTION SUBCUTANEOUS at 10:11

## 2023-03-04 RX ADMIN — HYDROMORPHONE HYDROCHLORIDE 0.25 MG: 1 INJECTION, SOLUTION INTRAMUSCULAR; INTRAVENOUS; SUBCUTANEOUS at 07:52

## 2023-03-04 RX ADMIN — OXYCODONE HYDROCHLORIDE AND ACETAMINOPHEN 1 TABLET: 7.5; 325 TABLET ORAL at 21:26

## 2023-03-04 RX ADMIN — CEFTRIAXONE 2 G: 2 INJECTION, POWDER, FOR SOLUTION INTRAMUSCULAR; INTRAVENOUS at 10:11

## 2023-03-04 RX ADMIN — GLIPIZIDE 5 MG: 5 TABLET ORAL at 07:52

## 2023-03-04 RX ADMIN — FERROUS SULFATE TAB 325 MG (65 MG ELEMENTAL FE) 325 MG: 325 (65 FE) TAB at 10:11

## 2023-03-04 RX ADMIN — PANTOPRAZOLE SODIUM 40 MG: 40 TABLET, DELAYED RELEASE ORAL at 05:39

## 2023-03-04 RX ADMIN — METHOCARBAMOL TABLETS 500 MG: 500 TABLET, COATED ORAL at 12:08

## 2023-03-04 RX ADMIN — FERROUS SULFATE TAB 325 MG (65 MG ELEMENTAL FE) 325 MG: 325 (65 FE) TAB at 21:18

## 2023-03-04 RX ADMIN — FINASTERIDE 5 MG: 5 TABLET, FILM COATED ORAL at 10:11

## 2023-03-04 RX ADMIN — DOCUSATE SODIUM 50MG AND SENNOSIDES 8.6MG 1 TABLET: 8.6; 5 TABLET, FILM COATED ORAL at 10:11

## 2023-03-04 RX ADMIN — GABAPENTIN 800 MG: 400 CAPSULE ORAL at 17:08

## 2023-03-04 RX ADMIN — METHOCARBAMOL TABLETS 500 MG: 500 TABLET, COATED ORAL at 07:52

## 2023-03-04 RX ADMIN — METHOCARBAMOL TABLETS 500 MG: 500 TABLET, COATED ORAL at 17:09

## 2023-03-04 RX ADMIN — Medication 10 ML: at 08:00

## 2023-03-04 RX ADMIN — OXYCODONE HYDROCHLORIDE AND ACETAMINOPHEN 1 TABLET: 7.5; 325 TABLET ORAL at 05:39

## 2023-03-04 RX ADMIN — METHOCARBAMOL TABLETS 500 MG: 500 TABLET, COATED ORAL at 21:18

## 2023-03-04 RX ADMIN — OXYCODONE HYDROCHLORIDE AND ACETAMINOPHEN 1 TABLET: 7.5; 325 TABLET ORAL at 17:07

## 2023-03-04 RX ADMIN — DOCUSATE SODIUM 50MG AND SENNOSIDES 8.6MG 1 TABLET: 8.6; 5 TABLET, FILM COATED ORAL at 21:18

## 2023-03-04 RX ADMIN — VANCOMYCIN HYDROCHLORIDE 1000 MG: 1 INJECTION, SOLUTION INTRAVENOUS at 12:33

## 2023-03-04 RX ADMIN — GABAPENTIN 800 MG: 400 CAPSULE ORAL at 21:18

## 2023-03-04 RX ADMIN — ATORVASTATIN CALCIUM 10 MG: 20 TABLET, FILM COATED ORAL at 10:21

## 2023-03-04 RX ADMIN — GADOBENATE DIMEGLUMINE 20 ML: 529 INJECTION, SOLUTION INTRAVENOUS at 09:11

## 2023-03-04 RX ADMIN — GABAPENTIN 800 MG: 400 CAPSULE ORAL at 10:12

## 2023-03-04 RX ADMIN — OXYCODONE HYDROCHLORIDE AND ACETAMINOPHEN 1 TABLET: 7.5; 325 TABLET ORAL at 12:07

## 2023-03-04 NOTE — PLAN OF CARE
Goal Outcome Evaluation:              Outcome Evaluation: Patient POD 5 lumbar sx, dressing was changed and LT PATRICK removed, RT PATRICK still present. AxOx4, on room air, on IV ABT's, SCD's on, encouraged IS, PRN pain meds given.MRI was done this morning.Ambulated in hallway, voiding well per BR and no issues noted and reported, had small BM today.Needs attended, will continue to monitor.

## 2023-03-04 NOTE — PROGRESS NOTES
[employer] THORACIC/LUMBAR NEUROSURGERY POSTOP NOTE      CC: POD 5 L3-5 decompression/TLIF with L3-S2 instrumentation      Subjective     Interval History: Tmax  101.8 yesterday evening. +BM.  Had some increased back pain this morning but now better.  No headache.    Objective     Vital signs in last 24 hours:  Temp:  [98.2 °F (36.8 °C)-101.8 °F (38.8 °C)] 98.2 °F (36.8 °C)  Heart Rate:  [57-90] 57  Resp:  [16-17] 16  BP: (105-133)/(61-74) 106/66    Intake/Output this shift:  I/O this shift:  In: 170 [P.O.:170]  Out: -     PATRICK 1 left- 30 cc x 24 hours   PATRICK to right-115 cc x 24 hours-65 x 12 hours    LABS:  Results from last 7 days   Lab Units 03/04/23  0400 03/03/23  0105 03/02/23  0929   WBC 10*3/mm3 3.59 5.86 5.67   HEMOGLOBIN g/dL 8.7* 10.1* 9.8*   HEMATOCRIT % 26.0* 32.0* 30.6*   PLATELETS 10*3/mm3 162 224 205     Results from last 7 days   Lab Units 03/04/23  0400 03/03/23  0105   SODIUM mmol/L 135* 135*   POTASSIUM mmol/L 4.2 4.6   CHLORIDE mmol/L 103 101   CO2 mmol/L 27.4 22.9   BUN mg/dL 17 24*   CREATININE mg/dL 0.97 1.30*   CALCIUM mg/dL 8.6 9.1   BILIRUBIN mg/dL 0.2  --    ALK PHOS U/L 53  --    ALT (SGPT) U/L 39  --    AST (SGOT) U/L 59*  --    GLUCOSE mg/dL 117* 140*     Respiratory PCR-neg  Blood culture x2 3/2-NGTD  Urine culture 3/2-NGTD    IMAGING STUDIES:  XR Chest 1 View    Result Date: 3/3/2023  No focal pulmonary consolidation. Follow-up as clinical indications persist.  This report was finalized on 3/3/2023 2:09 PM by Dr. Tanner Bourgeois M.D.      MRI lumbar spine-report pending but reviewed with Dr. Bernardo.  No evidence of any obvious abscess. PO changes with significant susceptibility artifact. No marrow signal abnormality.     I personally viewed and interpreted the patient's MRI lumbar spine.    Meds reviewed/changed: Yes  Tylenol 650 mg p.o. every 4 hours as needed- 1 doses  Gabapentin 800 mg p.o. 3 times daily  Robaxin 500 mg p.o. 4 times daily  Percocet 7.5 every 4 as needed 2  doses  Dilaudid 0.25 mg IVP x 1    Physical Exam:    General:   Awake, alert.  Sitting up in bed.  No acute distress.  Back:    Midline lumbar incision with chin PATRICK site x2. Dressing moist with serosang drainage.  PATRICK on the right with serous blood-tinged output, PATRICK on left with small amount of cherry color output. LEFT PATRICK removed.   Abdomen:   NT/ND   Motor:   Normal muscle strength, bulk and tone in lower extremities.   Station and Gait:             Patient ambulating CGA x1 with rolling walker.  Extremities:   No calf swelling or tenderness.  Mild tenderness right anterior medial distal thigh.  No redness or palpable abnormality.      Assessment & Plan     ASSESSMENT:      Spondylolisthesis, lumbar region    Lumbar radiculopathy    Synovial cyst of lumbar facet joint    History of hepatitis C    BPH (benign prostatic hyperplasia)    Diabetes mellitus (HCC)    Neuropathy    Patient status post two-level lumbar decompression multilevel lumbosacral fusion 5 days ago.  Still having intermittent fevers although the source is uncertain.  MRI lumbar spine completed early this morning does not show any obvious infected fluid collection or marrow changes.  Report pending.  He looks pretty well today.  Does not feel bad except for back soreness.  Leg strength okay.  Incision site unremarkable tolerating antibiotics.  Left PATRICK has waned, will DC, keep right PATRICK today.  Due to persistent fevers, will check bilateral lower extremity Dopplers.  Appreciate ID assistance in patient care. continue to encourage him on the use of his incentive spirometer and frequent ambulation.      PLAN:   Continue Rocephin  Add vancomycin  DC left PATRICK  Encourage I-S and frequent ambulation  Doppler chin LE  Await MRI lumbar  CBC AM    I discussed the patient's findings and my recommendations with patient, nursing staff and Dr. Blas    During entirety of patient visit, I utilized appropriate PPE. While in the room and during my examination of the  "patient I wore gloves, gown, mask, eye protection and followed enhanced droplet/contact isolation protocol and precautions.  Hand hygiene was completed before and after.this patient encounter.       LOS: 1 day       Shruti Mcneil, APRN  3/4/2023  11:35 EST    \"Dictated utilizing Dragon dictation\".      "

## 2023-03-04 NOTE — PLAN OF CARE
Goal Outcome Evaluation:           Progress: improving  Outcome Evaluation: POD5 L3-L5 fusion and L3-S2 instrumentation. Pt A&Ox4, VSS, on 2L NC, ambulating x1 assist, pain controlled with prn oral pain medication, voiding per urinal. Pt dressing c/d/i with 2 JPs in place. Pt had no fevers overnight. Plan pending. Will CTM

## 2023-03-05 ENCOUNTER — READMISSION MANAGEMENT (OUTPATIENT)
Dept: CALL CENTER | Facility: HOSPITAL | Age: 78
End: 2023-03-05
Payer: OTHER GOVERNMENT

## 2023-03-05 VITALS
HEIGHT: 64 IN | RESPIRATION RATE: 18 BRPM | HEART RATE: 65 BPM | WEIGHT: 238.1 LBS | BODY MASS INDEX: 40.65 KG/M2 | OXYGEN SATURATION: 97 % | SYSTOLIC BLOOD PRESSURE: 101 MMHG | TEMPERATURE: 98 F | DIASTOLIC BLOOD PRESSURE: 63 MMHG

## 2023-03-05 LAB
BASOPHILS # BLD AUTO: 0.01 10*3/MM3 (ref 0–0.2)
BASOPHILS NFR BLD AUTO: 0.3 % (ref 0–1.5)
DEPRECATED RDW RBC AUTO: 43.3 FL (ref 37–54)
EOSINOPHIL # BLD AUTO: 0.14 10*3/MM3 (ref 0–0.4)
EOSINOPHIL NFR BLD AUTO: 4.3 % (ref 0.3–6.2)
ERYTHROCYTE [DISTWIDTH] IN BLOOD BY AUTOMATED COUNT: 12.4 % (ref 12.3–15.4)
HCT VFR BLD AUTO: 26.8 % (ref 37.5–51)
HGB BLD-MCNC: 8.9 G/DL (ref 13–17.7)
IMM GRANULOCYTES # BLD AUTO: 0.01 10*3/MM3 (ref 0–0.05)
IMM GRANULOCYTES NFR BLD AUTO: 0.3 % (ref 0–0.5)
LYMPHOCYTES # BLD AUTO: 0.83 10*3/MM3 (ref 0.7–3.1)
LYMPHOCYTES NFR BLD AUTO: 25.4 % (ref 19.6–45.3)
MCH RBC QN AUTO: 32.2 PG (ref 26.6–33)
MCHC RBC AUTO-ENTMCNC: 33.2 G/DL (ref 31.5–35.7)
MCV RBC AUTO: 97.1 FL (ref 79–97)
MONOCYTES # BLD AUTO: 0.51 10*3/MM3 (ref 0.1–0.9)
MONOCYTES NFR BLD AUTO: 15.6 % (ref 5–12)
NEUTROPHILS NFR BLD AUTO: 1.77 10*3/MM3 (ref 1.7–7)
NEUTROPHILS NFR BLD AUTO: 54.1 % (ref 42.7–76)
NRBC BLD AUTO-RTO: 0 /100 WBC (ref 0–0.2)
PLATELET # BLD AUTO: 172 10*3/MM3 (ref 140–450)
PMV BLD AUTO: 10.5 FL (ref 6–12)
RBC # BLD AUTO: 2.76 10*6/MM3 (ref 4.14–5.8)
WBC NRBC COR # BLD: 3.27 10*3/MM3 (ref 3.4–10.8)

## 2023-03-05 PROCEDURE — 25010000002 ENOXAPARIN PER 10 MG: Performed by: NEUROLOGICAL SURGERY

## 2023-03-05 PROCEDURE — 99024 POSTOP FOLLOW-UP VISIT: CPT | Performed by: NURSE PRACTITIONER

## 2023-03-05 PROCEDURE — 85025 COMPLETE CBC W/AUTO DIFF WBC: CPT | Performed by: NURSE PRACTITIONER

## 2023-03-05 PROCEDURE — 99232 SBSQ HOSP IP/OBS MODERATE 35: CPT | Performed by: STUDENT IN AN ORGANIZED HEALTH CARE EDUCATION/TRAINING PROGRAM

## 2023-03-05 RX ORDER — AMOXICILLIN 250 MG
1 CAPSULE ORAL 2 TIMES DAILY
Qty: 20 TABLET | Refills: 0 | Status: SHIPPED | OUTPATIENT
Start: 2023-03-05

## 2023-03-05 RX ORDER — ACETAMINOPHEN 325 MG/1
650 TABLET ORAL EVERY 6 HOURS PRN
COMMUNITY
Start: 2023-03-05

## 2023-03-05 RX ORDER — OXYCODONE AND ACETAMINOPHEN 7.5; 325 MG/1; MG/1
1 TABLET ORAL EVERY 4 HOURS PRN
Qty: 18 TABLET | Refills: 0 | Status: SHIPPED | OUTPATIENT
Start: 2023-03-05 | End: 2023-03-12

## 2023-03-05 RX ORDER — METHOCARBAMOL 500 MG/1
500 TABLET, FILM COATED ORAL 4 TIMES DAILY PRN
Qty: 40 TABLET | Refills: 1 | Status: SHIPPED | OUTPATIENT
Start: 2023-03-05

## 2023-03-05 RX ADMIN — GLIPIZIDE 5 MG: 5 TABLET ORAL at 07:45

## 2023-03-05 RX ADMIN — DOCUSATE SODIUM 50MG AND SENNOSIDES 8.6MG 1 TABLET: 8.6; 5 TABLET, FILM COATED ORAL at 09:23

## 2023-03-05 RX ADMIN — FINASTERIDE 5 MG: 5 TABLET, FILM COATED ORAL at 09:24

## 2023-03-05 RX ADMIN — OXYCODONE HYDROCHLORIDE AND ACETAMINOPHEN 1 TABLET: 7.5; 325 TABLET ORAL at 05:59

## 2023-03-05 RX ADMIN — PANTOPRAZOLE SODIUM 40 MG: 40 TABLET, DELAYED RELEASE ORAL at 05:59

## 2023-03-05 RX ADMIN — Medication 10 ML: at 09:25

## 2023-03-05 RX ADMIN — ATORVASTATIN CALCIUM 10 MG: 20 TABLET, FILM COATED ORAL at 09:23

## 2023-03-05 RX ADMIN — METHOCARBAMOL TABLETS 500 MG: 500 TABLET, COATED ORAL at 09:24

## 2023-03-05 RX ADMIN — METHOCARBAMOL TABLETS 500 MG: 500 TABLET, COATED ORAL at 12:21

## 2023-03-05 RX ADMIN — FERROUS SULFATE TAB 325 MG (65 MG ELEMENTAL FE) 325 MG: 325 (65 FE) TAB at 09:23

## 2023-03-05 RX ADMIN — GABAPENTIN 800 MG: 400 CAPSULE ORAL at 09:24

## 2023-03-05 RX ADMIN — Medication 500 MCG: at 09:24

## 2023-03-05 RX ADMIN — OXYCODONE HYDROCHLORIDE AND ACETAMINOPHEN 1 TABLET: 7.5; 325 TABLET ORAL at 14:40

## 2023-03-05 RX ADMIN — OXYCODONE HYDROCHLORIDE AND ACETAMINOPHEN 1 TABLET: 7.5; 325 TABLET ORAL at 10:31

## 2023-03-05 NOTE — PLAN OF CARE
Goal Outcome Evaluation:              Outcome Evaluation: Patient discharged to home with HH, VSS, stable condition, d./c instructions given, needs attended.

## 2023-03-05 NOTE — DISCHARGE SUMMARY
Jermaine Denis  1945    Patient Care Team:  Linh Mckeon MD as PCP - General (Internal Medicine)    Date of Admit: 2/27/2023    Date of Discharge:  3/5/2023    Discharge Diagnosis:  Spondylolisthesis, lumbar region    Lumbar radiculopathy    Synovial cyst of lumbar facet joint    History of hepatitis C    BPH (benign prostatic hyperplasia)    Diabetes mellitus (HCC)    Neuropathy      Procedures Performed  Procedure(s):  LUMBAR THREE TO FOUR, FOUR TO FIVE TRANSFORAMINAL LUMBAR INTERBODY FUSION WITH LUMBAR THREE TO FOUR, FOUR TO FIVE, FIVE TO SACRAL ONE, SACRAL ONE TO TWO INSTRUMENTATION WITH ROBOTIC ASSISTANCE  CYSTOSCOPY FLEXIBLE, insertion of jean baptiste catheter with urethral dilation       Complications: no intraoperative complications    Consultants:   Consults     Date and Time Order Name Status Description    3/3/2023  1:08 AM Inpatient Infectious Diseases Consult Completed     3/2/2023  5:10 PM Inpatient Urology Consult            Condition on Discharge: stable    Discharge disposition: home with home health    Pertinent Test Results: microbiology: blood culture: NGTD x 2 days and urine culture: negative  radiology:  XR Chest 1 View     Result Date: 3/3/2023  No focal pulmonary consolidation. Follow-up as clinical indications persist.  This report was finalized on 3/3/2023 2:09 PM by Dr. Tanner Bourgeois M.D.       MRI lumbar spine-report pending but reviewed with Dr. Bernardo.  No evidence of any obvious abscess. PO changes with significant susceptibility artifact. No marrow signal abnormality.     Doppler bilateral lower extremity 3/4-negative    Brief HPI: Patient evaluated in office for complaints of back and right leg pain. Imaging revealed autofusion of L5-S1.  There is also L4-5 severe facet arthropathy resulting in bilateral foraminal and lateral recess stenosis.  There is also synovial cyst at the left L3-4 space. RBAs of treatment were discussed including the above procedure. Patient consented to  above procedure.    Hospital Course: Patient admitted for above procedure. The procedure itself was without complication. The patient was transferred to [address] [place] [address] following recovery.  Following surgery patient reported resolution of right leg pain.  Back pain was controllable with medications.  A Dodge catheter had to be placed intraoperatively due to urethral stricture.  This remained in place until postop day 4.  It was removed due to patient experiencing recurrent fevers.  He had negative chest x-ray, normal white blood cell count.  Lactate elevated briefly but returned to normal.  Procalcitonin only slightly elevated.  He was placed on empiric Rocephin and vancomycin was added due to persistent fever.  ID service evaluated and did not find any systemic issues.  Blood cultures negative.  Urine culture negative although urinalysis was questionable.  PATRICK output waxed and waned.  Left PATRICK removed yesterday and right PATRICK removed today.  Incision site has been unremarkable.  MRI lumbar spine shows postoperative changes but no evidence of infectious etiology and Doppler studies bilateral lower extremity negative.  Following Dodge removal, patient has been voiding.  He is tolerating diet passing flatus and having bowel movements.  Physical therapy signed off on postoperative day 1 as patient was ambulating well.  He is continue to ambulate with staff with contact-guard assist and rolling walker.  I believe he would benefit from some home health therapy though.  I spoke with Dr. Stephenson, ID service today.  As patient has remained afebrile for the last 36 hours, white blood cell count normal, and all cultures overall negative, okay for discharge and discontinuation of antibiotics.  Patient has follow-up appointment with our service 3/15 which he will keep.  We have reviewed activity instructions and restrictions.    Discharge Physical Exam:    Temp:  [97.4 °F (36.3 °C)-98.2 °F (36.8 °C)] 98 °F (36.7 °C)  Heart Rate:   [57-71] 65  Resp:  [16-18] 18  BP: ()/(58-66) 101/63    Current labs:  Results from last 7 days   Lab Units 03/05/23  0442 03/04/23  0400 03/03/23  0105   WBC 10*3/mm3 3.27* 3.59 5.86   HEMOGLOBIN g/dL 8.9* 8.7* 10.1*   HEMATOCRIT % 26.8* 26.0* 32.0*   PLATELETS 10*3/mm3 172 162 224     Results from last 7 days   Lab Units 03/04/23  0400 03/03/23  0105   SODIUM mmol/L 135* 135*   POTASSIUM mmol/L 4.2 4.6   CHLORIDE mmol/L 103 101   CO2 mmol/L 27.4 22.9   BUN mg/dL 17 24*   CREATININE mg/dL 0.97 1.30*   CALCIUM mg/dL 8.6 9.1   BILIRUBIN mg/dL 0.2  --    ALK PHOS U/L 53  --    ALT (SGPT) U/L 39  --    AST (SGOT) U/L 59*  --    GLUCOSE mg/dL 117* 140*         General Appearance No acute distress.  Sitting up in chair.  Looks well today.   Neurological Awake, Alert, and oriented x 3   Motor Strength full bilateral lower extremities, tone normal   Sensory Intact to light touch bilateral lower extremities   Gait and station Ambulating contact-guard assist with rolling walker   Back  midline lumbar with well approximated staple line and no redness drainage swelling.  Left PATRICK removed yesterday.  Right PATRICK removed this morning by nursing.  Reported only 35 cc x 24 hours in bulb with serous output.   Extremities Moves all extremities well,  no cyanosis, no redness    Mild edema bilateral lower extremities     Discharge Medications  Preet has been reviewed and narcotic consent is on file in the patient's chart.     Your medication list      START taking these medications      Instructions Last Dose Given Next Dose Due   acetaminophen 325 MG tablet  Commonly known as: TYLENOL      Take 2 tablets by mouth Every 6 (Six) Hours As Needed for Mild Pain or Fever.       oxyCODONE-acetaminophen 7.5-325 MG per tablet  Commonly known as: PERCOCET      Take 1 tablet by mouth Every 4 (Four) Hours As Needed for Moderate Pain or Severe Pain for up to 7 days.       sennosides-docusate 8.6-50 MG per tablet  Commonly known as:  PERICOLACE      Take 1 tablet by mouth 2 (Two) Times a Day.          CHANGE how you take these medications      Instructions Last Dose Given Next Dose Due   methocarbamol 500 MG tablet  Commonly known as: ROBAXIN  What changed:   · when to take this  · reasons to take this      Take 1 tablet by mouth 4 (Four) Times a Day As Needed for Muscle Spasms.          CONTINUE taking these medications      Instructions Last Dose Given Next Dose Due   aspirin 81 MG EC tablet      Take 81 mg by mouth Every Other Day. HOLD PRIOR TO SURGERY PER MD       calcium citrate-vitamin d 315-250 MG-UNIT tablet tablet  Commonly known as: CALCITRATE      Take 2 tablets by mouth 2 (Two) Times a Day. HOLD PRIOR TO SURG       cephalexin 500 MG capsule  Commonly known as: KEFLEX      Take 1 capsule by mouth Every Morning.       cetirizine 10 MG tablet  Commonly known as: zyrTEC      Take 10 mg by mouth Daily As Needed for Allergies.       docusate sodium 100 MG capsule      Take 100 mg by mouth 2 (Two) Times a Day.       ferrous sulfate 325 (65 FE) MG tablet      Take 325 mg by mouth 2 (Two) Times a Day.       finasteride 5 MG tablet  Commonly known as: PROSCAR      Take 5 mg by mouth Daily.       fish oil 1000 MG capsule capsule      Take 1,000 mg by mouth 3 (Three) Times a Day With Meals. HOLD PRIOR TO SURG       furosemide 20 MG tablet  Commonly known as: LASIX      Take 20 mg by mouth Every Morning.       gabapentin 400 MG capsule  Commonly known as: NEURONTIN      Take 800 mg by mouth 3 (Three) Times a Day.       glipizide 5 MG tablet  Commonly known as: GLUCOTROL      Take 5 mg by mouth 2 (Two) Times a Day Before Meals.       hydrocortisone 2.5 % cream      Apply 1 application topically As Needed.       ketoconazole 2 % cream  Commonly known as: NIZORAL      Apply 1 application topically to the appropriate area as directed Daily.       ketoconazole 2 % shampoo  Commonly known as: NIZORAL      Apply  topically to the appropriate area as  directed 2 (Two) Times a Week.       omeprazole 20 MG capsule  Commonly known as: priLOSEC      Take 20 mg by mouth Every Evening.       simvastatin 20 MG tablet  Commonly known as: ZOCOR      Take 10 mg by mouth Every Night.       vitamin B-12 500 MCG tablet  Commonly known as: CYANOCOBALAMIN      Take 500 mcg by mouth Every Other Day. HOLD PRIOR TO SURG          ASK your doctor about these medications      Instructions Last Dose Given Next Dose Due   lisinopril 20 MG tablet  Commonly known as: PRINIVIL,ZESTRIL      Take 10 mg by mouth Every Morning. 1/2 tab             Where to Get Your Medications      These medications were sent to [place] [address]    Hours: 7:00 AM-6:00 PM Mon-Fri, 8:00 AM-4:30 PM Sat-Sun (Closed 12-12:30PM) Phone: [phone]   · methocarbamol 500 MG tablet  · oxyCODONE-acetaminophen 7.5-325 MG per tablet  · sennosides-docusate 8.6-50 MG per tablet     You can get these medications from any pharmacy    You don't need a prescription for these medications  · acetaminophen 325 MG tablet         Discharge Diet:   Diet Instructions     Advance Diet as Tolerated      Diet:      Diet Texture / Consistency: Regular    Common Modifiers: Consistent Carbohydrate    Advance as tolerated        Diet Order   Procedures   • Diet: Diabetic Diets; Consistent Carbohydrate; Texture: Regular Texture (IDDSI 7); Fluid Consistency: Thin (IDDSI 0)       Activity at Discharge:   Activity Instructions     Discharge Activity      1) No driving until cleared by us and no longer taking narcotics.   2) Off work/school until cleared by us.  3) May shower but no submerging wound in tub, pool, etc.  4) Do not lift / push / pull more then 5 lbs.  5.)No overhead activity/ No bending/twisting/impact activity    Pelvic Rest            Call for: questions or concerns    Follow-up Appointments  Future Appointments   Date Time Provider Department Center   3/15/2023  9:30 AM  Matteo Park MD [facility]      Follow-up Information     Linh Mckeon MD .    Specialty: Internal Medicine  Contact information:  [address]  [place] [id number] [phone]                       Additional Instructions for the Follow-ups that You Need to Schedule     Ambulatory Referral to Home Health (Hospital)   As directed      Face to Face Visit Date: 3/5/2023    Follow-up provider for Plan of Care?: I treated the patient in an acute care facility and will not continue treatment after discharge.    Follow-up provider: MATTEO PARK [[id number]]    Reason/Clinical Findings: postop lumbar fusion    Describe mobility limitations that make leaving home difficult: activity restricitons    Nursing/Therapeutic Services Requested: Physical Therapy    PT orders: Therapeutic exercise Gait Training Transfer training Strengthening Home safety assessment    Weight Bearing Status: As Tolerated    Frequency: Other (2 times daily)         Discharge Follow-up with Specialty: urology   As directed      Specialty: urology    Follow Up Details: for urinary retention follow up. NO SCOPES for now         Notify Physician or Go To The ED For the Following Conditions   As directed      Including but not limited to fever >100.5, chills, wound concerns (redness, swelling, drainage), new symptoms of numbness, tingling, weakness; new or uncontrolled pain despite using prescribed medications    Order Comments: Including but not limited to fever >100.5, chills, wound concerns (redness, swelling, drainage), new symptoms of numbness, tingling, weakness; new or uncontrolled pain despite using prescribed medications                Test Results Pending at Discharge  Pending Labs     Order Current Status    Blood Culture - Blood, Arm, Left Preliminary result    Blood Culture - Blood, Arm, Left Preliminary result         None    I discussed the discharge instructions with patient    Shruti ESPINAL IsabelcorieGRANT  03/05/23  10:20 EST    30 min  "spent in reviewing records, discussion and examination of the patient and discussion with other members of the patient's medical team.    \"Dictated utilizing Dragon dictation\".      "

## 2023-03-05 NOTE — PLAN OF CARE
Goal Outcome Evaluation:    Alert and pleasant.  Able to make needs known.  Up, sleeping in chair upon assessment.  RA, and home CPAP at .  Dressing to midline, lower back, dry and intact.  PATRICK draining extending out right side of dressing, w/serosanguinous drainage.  PRN for pain effective.  Pt up, ad nicolle to restroom/bed.  Steady gait.  Concentrated urine out per urinal, in adequate amounts.  Call light within reach.  Patient now sleeping abed.  Appears without distress.  Will continue to monitor.     Spouse, and child reported +Covid testing on 3/5/23 in early am.  Charge RN educated family of no visitors with +Covid testing on the floor.  Voiced understanding.

## 2023-03-05 NOTE — CASE MANAGEMENT/SOCIAL WORK
Continued Stay Note  [facility]     Patient Name: Jermaine Denis  MRN: [id number]  Today's Date: 3/5/2023    Admit Date: 2/27/2023    Plan: Home via private auto with [facility]    Discharge Plan     Row Name 03/05/23 1321       Plan    Plan Home via private auto with [facility]     Patient/Family in Agreement with Plan yes    Plan Comments Inbound call from staff RN who states pt has DC orders for today and needs HH. Pt states he has had [facility] in the past. Called and spoke with Enriqueta/Jessica and they should be able to accept and follow. Pt states his spouse will drive him home. Updated staff RN.........JW               Discharge Codes    No documentation.               Expected Discharge Date and Time     Expected Discharge Date Expected Discharge Time    Mar 5, 2023             Martina Gaspar, RN

## 2023-03-05 NOTE — PROGRESS NOTES
LOS: 2 days     Chief Complaint: Fever    Interval History: Patient remains afebrile today and reports no other acute complaints.  Denies any respiratory symptoms.  Overnight there was some confusion on patient's family members testing positive for COVID however on further inspection this may have been reported in error.    Patient reports that his wife works at a nursing facility and tests for COVID every shift.  Reports she was tested today and has been negative.  He states she has been unaware of any positive test.  It was also reported that his child tested positive however he has no child.    Only complaint today is continued soreness of the back.    Vital Signs  Temp:  [97.4 °F (36.3 °C)-98.2 °F (36.8 °C)] 97.7 °F (36.5 °C)  Heart Rate:  [57-71] 66  Resp:  [16] 16  BP: ()/(58-66) 120/62    Physical Exam:  General: In no acute distress  HEENT: Oropharynx clear, moist mucous membranes  Cardiovascular: RRR  Respiratory: Normal work of breathing  GI: Soft, NT/ND  Skin: No rashes or lesions  Extremities: No edema, cyanosis  Access: Peripheral IV.    Antibiotics:  Anti-Infectives (From admission, onward)    Ordered     Dose/Rate Route Frequency Start Stop    03/03/23 0905  vancomycin (VANCOCIN) 1000 mg/200 mL dextrose 5% IVPB        Ordering Provider: Indio Blas MD    1,000 mg  over 60 Minutes Intravenous Every 24 Hours 03/04/23 1100 03/17/23 1059    03/03/23 1039  cefTRIAXone (ROCEPHIN) 2 g in sodium chloride 0.9 % 100 mL IVPB-VTB        Ordering Provider: Kelly Jordan MD    2 g  200 mL/hr over 30 Minutes Intravenous Every 24 Hours 03/04/23 0900 03/09/23 0859    03/03/23 0905  vancomycin IVPB 2000 mg in 0.9% Sodium Chloride (premix) 500 mL        Ordering Provider: Indio Blas MD    2,000 mg  over 120 Minutes Intravenous Once 03/03/23 1100 03/03/23 1357    03/03/23 0819  Pharmacy to dose vancomycin        Ordering Provider: Indio Blas MD     Does not apply Continuous PRN 03/03/23 0819 03/17/23 0918     02/27/23 1449  ceFAZolin in dextrose (ANCEF) IVPB solution 2 g        Ordering Provider: Indio Blas MD    2 g  over 30 Minutes Intravenous Every 8 Hours 02/27/23 1830 02/28/23 1520    02/27/23 0534  ceFAZolin in dextrose (ANCEF) IVPB solution 2 g        Ordering Provider: Indio Blas MD    2 g  over 30 Minutes Intravenous Once 02/27/23 0536 02/27/23 1047           Results Review:     I reviewed the patient's new clinical results.  I reviewed the patient's new imaging results and agree with the interpretation.  I reviewed the patient's other test results and agree with the interpretation    Lab Results   Component Value Date    WBC 3.27 (L) 03/05/2023    HGB 8.9 (L) 03/05/2023    HCT 26.8 (L) 03/05/2023    MCV 97.1 (H) 03/05/2023     03/05/2023     Lab Results   Component Value Date    GLUCOSE 117 (H) 03/04/2023    BUN 17 03/04/2023    CREATININE 0.97 03/04/2023    EGFRIFNONA 74 06/21/2018    EGFRIFAFRI 90 06/21/2018    BCR 17.5 03/04/2023    CO2 27.4 03/04/2023    CALCIUM 8.6 03/04/2023    ALBUMIN 2.8 (L) 03/04/2023    AST 59 (H) 03/04/2023    ALT 39 03/04/2023       Microbiology:  3/2 urine culture no growth  3/2 blood cultures no growth to date  3/3 respiratory panel negative    New imaging:   3/4 MRI of the lumbar spine report reviewed with postoperative changes however no evidence of concerning findings for infection.    3/4 lower extremity Doppler negative for DVT    Assessment    #Fevers, resolved  #CKD  #Radiculopathy with spondylolisthesis status post L3-4 and L4-5 transforaminal lumbar interbody fusion with L3-4, 4-5, L5-S1, S1-2 instrumentation on 2/27  #Urinary retention requiring Dodge catheter placement    Case discussed with Shruti Sanchez with neurosurgery today and given he has remained afebrile with negative MRI and no other acute complaints, I would suggest stopping antibiotics. No reservations from a infectious disease standpoint for discharge at this time.    Thank you for allowing  me to be involved in the care of this patient. Infectious diseases will sign off at this time with antibiotics plan in place, but please call me at [phone] if any further ID questions or new ID concerns.

## 2023-03-05 NOTE — DISCHARGE PLACEMENT REQUEST
"Yenni Denis (78 y.o. Male)     Date of Birth   1945    Social Security Number       Address   [address] [place] [address]    Home Phone   [phone]    MRN   [id number]       [facility]   [facility]    Marital Status                               Admission Date   2/27/23    Admission Type   Elective    Admitting Provider   Indio Blas MD    Attending Provider   Indio Blas MD    Department, Room/Bed   [place] 20[address]184/1       Discharge Date       Discharge Disposition   Home-Health Care [facility]    Discharge Destination                               Attending Provider: Indio Blas MD    Allergies: Sulfa Antibiotics    Isolation: Enh Drop/Con   Infection: COVID (rule out) (03/03/23)   Code Status: CPR    Ht: 162.6 cm (64.02\")   Wt: 108 kg (238 lb 1.6 oz)    Admission Cmt: None   Principal Problem: Spondylolisthesis, lumbar region [M43.16]                 Active Insurance as of 2/27/2023     Primary Coverage     Payor Plan Insurance Group Employer/Plan Group    [facility] [id number]      Payor Plan Address Payor Plan Phone Number Payor Plan Fax Number Effective Dates    [address] [phone]  10/7/1998 - None Entered    [facility] [id number]       Subscriber Name Subscriber Birth Date Member ID       YENNI DENIS 1945 [id number]                 Emergency Contacts      (Rel.) Home Phone Work Phone Mobile Phone    Liz Denis (Spouse) -- -- [phone]              "

## 2023-03-06 NOTE — CASE MANAGEMENT/SOCIAL WORK
Case Management Discharge Note      Final Note: Patient dc'd home with Jessica . Gaviota LEE RN         Selected Continued Care - Discharged on 3/5/2023 Admission date: 2/27/2023 - Discharge disposition: Home-Health Care Svc    Destination    No services have been selected for the patient.              Durable Medical Equipment    No services have been selected for the patient.              Dialysis/Infusion    No services have been selected for the patient.              Home Medical Care Coordination complete.    Service Provider Selected Services Address Phone Fax Patient Preferred    [facility] [address], [address], [facility] [address] [phone] --          Therapy    No services have been selected for the patient.              Community Resources    No services have been selected for the patient.              Community & DME    No services have been selected for the patient.                  Transportation Services  Private: Car    Final Discharge Disposition Code: 06 - home with home health care

## 2023-03-06 NOTE — OUTREACH NOTE
Prep Survey    Flowsheet Row Responses   [facility] facility patient discharged from? [facility]   Is LACE score < 7 ? No   Eligibility Readm Mgmt   Discharge diagnosis  Lumbar radiculopathy   Does the patient have one of the following disease processes/diagnoses(primary or secondary)? General Surgery   Does the patient have Home health ordered? Yes   What is the Home health agency?   [facility]    Is there a DME ordered? No   Prep survey completed? Yes          Alisa NELSON - Registered Nurse

## 2023-03-07 LAB — BACTERIA SPEC AEROBE CULT: NORMAL

## 2023-03-07 NOTE — PROGRESS NOTES
Patient ID: Jermaine Denis is a 78 y.o. male is an established patient with 2 week post/op visit  who has previously undergone LUMBAR THREE TO FOUR, FOUR TO FIVE TRANSFORAMINAL LUMBAR INTERBODY FUSION WITH LUMBAR THREE TO FOUR, FOUR TO FIVE, FIVE TO SACRAL ONE, SACRAL ONE TO TWO INSTRUMENTATION WITH ROBOTIC ASSISTANCE on 2/27/23.    Today. Jermaine Denis reports no lower back pain and he states his sciatica as been resolved. He reports not radiating pain, numbness and tingling down bilateral legs. He reports weakness of bilateral legs.  He denies loss of bowel/bladder incontinence. He reports no use of heat/ice.  He reports today his first visit with home physical therapy.    Subjective:     History of Present Illness  Jermaine is doing very well and is sleeping better and has resolution of his radiculopathy.  He denies any post decompressive radiculitis yet but he is only 2 weeks out from surgery.  He is going to start physical therapy with home PT soon.  The staples on his incision bother him.  He still continues to use a walker for now.      Review of Systems   Constitutional: Positive for activity change.   HENT: Negative.    Eyes: Negative.    Respiratory: Negative for chest tightness and shortness of breath.    Cardiovascular: Negative.    Gastrointestinal: Negative for constipation, diarrhea, nausea and vomiting.   Genitourinary: Negative for difficulty urinating and enuresis.   Musculoskeletal: Positive for gait problem. Negative for back pain.   Neurological: Positive for weakness. Negative for numbness.   Psychiatric/Behavioral: Positive for sleep disturbance.        While in the room and during my examination of the patient I wore a mask and eye protection.  I washed my hands before and after this patient encounter.  The patient was also wearing a mask.    The following portions of the patient's history were reviewed and updated as appropriate: allergies, current medications, past family history, past medical  history, past social history, past surgical history and problem list.     Objective:    Vitals:    03/15/23 0910   BP: 130/68   Pulse: 67   SpO2: 97%     There is no height or weight on file to calculate BMI.    Physical Exam  Constitutional:       Appearance: Normal appearance.   HENT:      Head: Normocephalic and atraumatic.   Eyes:      Extraocular Movements: Extraocular movements intact.      Conjunctiva/sclera: Conjunctivae normal.      Pupils: Pupils are equal, round, and reactive to light.   Cardiovascular:      Rate and Rhythm: Normal rate and regular rhythm.      Pulses: Normal pulses.   Pulmonary:      Breath sounds: Normal breath sounds.   Abdominal:      Palpations: Abdomen is soft.   Musculoskeletal:         General: Normal range of motion.      Cervical back: Normal range of motion and neck supple.   Skin:     General: Skin is warm and dry.   Neurological:      Mental Status: He is alert and oriented to person, place, and time.      Cranial Nerves: Cranial nerves 2-12 are intact.      Motor: Motor function is intact. No weakness or atrophy.      Coordination: Coordination is intact. Romberg sign negative. Romberg Test normal.      Gait: Gait is intact. Gait normal.      Deep Tendon Reflexes: Reflexes are normal and symmetric.      Reflex Scores:       Tricep reflexes are 2+ on the right side and 2+ on the left side.       Bicep reflexes are 2+ on the right side and 2+ on the left side.       Brachioradialis reflexes are 2+ on the right side and 2+ on the left side.       Patellar reflexes are 2+ on the right side and 2+ on the left side.       Achilles reflexes are 2+ on the right side and 2+ on the left side.  Psychiatric:         Speech: Speech normal.       Neurologic Exam     Mental Status   Oriented to person, place, and time.   Attention: normal. Concentration: normal.   Speech: speech is normal   Level of consciousness: alert    Cranial Nerves   Cranial nerves II through XII intact.     CN III,  IV, VI   Pupils are equal, round, and reactive to light.    Motor Exam   Muscle bulk: normal  Overall muscle tone: normal    Strength   Strength 5/5 except as noted.     Sensory Exam   Light touch normal.     Gait, Coordination, and Reflexes     Gait  Gait: normal    Coordination   Romberg: negative    Reflexes   Reflexes 2+ except as noted.   Right brachioradialis: 2+  Left brachioradialis: 2+  Right biceps: 2+  Left biceps: 2+  Right triceps: 2+  Left triceps: 2+  Right patellar: 2+  Left patellar: 2+  Right achilles: 2+  Left achilles: 2+       Results: No new neuroimaging    Assessment/Plan: Jermaine is doing well.  I like to see him back in 4 weeks with an x-ray of the lumbar spine.       Diagnoses and all orders for this visit:    1. Spinal stenosis, lumbar region, with neurogenic claudication (Primary)  -     XR Spine Lumbar 2 or 3 View; Future                Indio Blas MD  03/15/23  15:31 EDT

## 2023-03-08 ENCOUNTER — READMISSION MANAGEMENT (OUTPATIENT)
Dept: CALL CENTER | Facility: HOSPITAL | Age: 78
End: 2023-03-08
Payer: OTHER GOVERNMENT

## 2023-03-08 LAB — BACTERIA SPEC AEROBE CULT: NORMAL

## 2023-03-08 NOTE — OUTREACH NOTE
General Surgery Week 1 Survey    Flowsheet Row Responses   [facility] patient discharged from? [place]   Does the patient have one of the following disease processes/diagnoses(primary or secondary)? General Surgery   Week 1 attempt successful? No   Unsuccessful attempts Attempt 1          MARI DUNCAN - Registered Nurse

## 2023-03-10 ENCOUNTER — TELEPHONE (OUTPATIENT)
Dept: NEUROSURGERY | Facility: CLINIC | Age: 78
End: 2023-03-10

## 2023-03-10 NOTE — TELEPHONE ENCOUNTER
The [facility] received a fax that requires your attention. The document has been indexed to the patient’s chart for your review.      Reason for sending: [facility]  APPROVAL LETTER FOR HOME HEALTH    Documents Description: [facility]  APPROVAL LETTER FOR HOME HEALTH_[facility]_03/08/2023    Name of Sender: [facility]    Date Indexed: 03/10/2023    Notes (if needed):[facility]  APPROVAL LETTER FOR HOME HEALTH

## 2023-03-15 ENCOUNTER — OFFICE VISIT (OUTPATIENT)
Dept: NEUROSURGERY | Facility: CLINIC | Age: 78
End: 2023-03-15
Payer: OTHER GOVERNMENT

## 2023-03-15 ENCOUNTER — READMISSION MANAGEMENT (OUTPATIENT)
Dept: CALL CENTER | Facility: HOSPITAL | Age: 78
End: 2023-03-15
Payer: OTHER GOVERNMENT

## 2023-03-15 VITALS — HEART RATE: 67 BPM | OXYGEN SATURATION: 97 % | SYSTOLIC BLOOD PRESSURE: 130 MMHG | DIASTOLIC BLOOD PRESSURE: 68 MMHG

## 2023-03-15 DIAGNOSIS — M48.062 SPINAL STENOSIS, LUMBAR REGION, WITH NEUROGENIC CLAUDICATION: Primary | ICD-10-CM

## 2023-03-15 PROCEDURE — 99024 POSTOP FOLLOW-UP VISIT: CPT | Performed by: NEUROLOGICAL SURGERY

## 2023-03-15 NOTE — OUTREACH NOTE
General Surgery Week 2 Survey    Flowsheet Row Responses   [facility] facility patient discharged from? [place]   Does the patient have one of the following disease processes/diagnoses(primary or secondary)? General Surgery   Week 2 attempt successful? No   Unsuccessful attempts Attempt 1  [had a neurosurgery appt today]          Bibiana PHAN - Registered Nurse

## 2023-03-20 ENCOUNTER — READMISSION MANAGEMENT (OUTPATIENT)
Dept: CALL CENTER | Facility: HOSPITAL | Age: 78
End: 2023-03-20
Payer: OTHER GOVERNMENT

## 2023-03-20 NOTE — OUTREACH NOTE
General Surgery Week 2 Survey    Flowsheet Row Responses   [facility] patient discharged from? [place]   Does the patient have one of the following disease processes/diagnoses(primary or secondary)? General Surgery   Week 2 attempt successful? Yes   Call start time 1833   Call end time 1838   Discharge diagnosis  Lumbar radiculopathy   Is the patient taking all medications as directed (includes completed medication regime)? Yes   Does the patient have a follow up appointment scheduled with their surgeon? Yes   Has the patient kept scheduled appointments due by today? Yes   What is the [facility] health agency?   [facility]    Has home health visited the patient within 72 hours of discharge? Yes   Psychosocial issues? No   What is the patient's perception of their health status since discharge? Improving   Is the patient /caregiver able to teach back basic post-op care? Lifting as instructed by MD in discharge instructions   Is the patient/caregiver able to teach back signs and symptoms of incisional infection? Increased redness, swelling or pain at the incisonal site, Increased drainage or bleeding, Incisional warmth, Pus or odor from incision, Fever   Is the patient/caregiver able to teach back steps to recovery at home? Rest and rebuild strength, gradually increase activity, Set small, achievable goals for return to baseline health, Eat a well-balance diet, Make a list of questions for surgeon's appointment   If the patient is a current smoker, are they able to teach back resources for cessation? Not a smoker   Is the patient/caregiver able to teach back the hierarchy of who to call/visit for symptoms/problems? PCP, Specialist, Home health nurse, Urgent Care, ED, 911 Yes   Additional teach back comments States he is having some trouble sleeping.  Has been up walking several times and doing therapy.     Week 2 call completed? Yes   Wrap up additional comments Denies questions or needs at this time.           Devi CARTER - Licensed Nurse

## 2023-03-28 ENCOUNTER — READMISSION MANAGEMENT (OUTPATIENT)
Dept: CALL CENTER | Facility: HOSPITAL | Age: 78
End: 2023-03-28
Payer: OTHER GOVERNMENT

## 2023-03-28 NOTE — OUTREACH NOTE
General Surgery Week 3 Survey    Flowsheet Row Responses   [facility] patient discharged from? [place]   Does the patient have one of the following disease processes/diagnoses(primary or secondary)? General Surgery   Week 3 attempt successful? Yes   Call start time 1713   Call end time 1713   Discharge diagnosis  Lumbar radiculopathy   Person spoke with today (if not patient) and relationship patient   Meds reviewed with patient/caregiver? Yes   Is the patient having any side effects they believe may be caused by any medication additions or changes? No   Does the patient have all medications related to this admission filled (includes all antibiotics, pain medications, etc.) Yes   Is the patient taking all medications as directed (includes completed medication regime)? Yes   Does the patient have a follow up appointment scheduled with their surgeon? Yes   Has the patient kept scheduled appointments due by today? Yes   What is the Home health agency?   [facility]    Has home health visited the patient within 72 hours of discharge? Yes   Psychosocial issues? No   Did the patient receive a copy of their discharge instructions? Yes   Nursing interventions Reviewed instructions with patient   What is the patient's perception of their health status since discharge? Improving   Nursing interventions Nurse provided patient education   Is the patient /caregiver able to teach back basic post-op care? Lifting as instructed by MD in discharge instructions   Is the patient/caregiver able to teach back signs and symptoms of incisional infection? Increased redness, swelling or pain at the incisonal site, Increased drainage or bleeding, Incisional warmth, Pus or odor from incision, Fever   Is the patient/caregiver able to teach back steps to recovery at home? Rest and rebuild strength, gradually increase activity, Set small, achievable goals for return to baseline health, Eat a well-balance diet, Make a list of questions  for surgeon's appointment   If the patient is a current smoker, are they able to teach back resources for cessation? Not a smoker   Is the patient/caregiver able to teach back the hierarchy of who to call/visit for symptoms/problems? PCP, Specialist, Home health nurse, Urgent Care, ED, 911 Yes   Week 3 call completed? Yes   Is the patient interested in additional calls from an ambulatory ?  NOTE:  applies to high risk patients requiring additional follow-up. No   Graduated/Revoked comments Doing well.           Derik JOHN - Registered Nurse

## 2023-04-12 ENCOUNTER — HOSPITAL ENCOUNTER (OUTPATIENT)
Dept: GENERAL RADIOLOGY | Facility: HOSPITAL | Age: 78
Discharge: HOME | End: 2023-04-12
Admitting: NEUROLOGICAL SURGERY
Payer: OTHER GOVERNMENT

## 2023-04-12 DIAGNOSIS — M48.062 SPINAL STENOSIS, LUMBAR REGION, WITH NEUROGENIC CLAUDICATION: ICD-10-CM

## 2023-04-12 PROCEDURE — 72100 X-RAY EXAM L-S SPINE 2/3 VWS: CPT

## 2023-04-12 NOTE — PROGRESS NOTES
Patient ID: Jermaine Denis is a 78 y.o. male is an established patient with 4 week follow up with XR  who has previously undergone LUMBAR THREE TO FOUR, FOUR TO FIVE TRANSFORAMINAL LUMBAR INTERBODY FUSION WITH LUMBAR THREE TO FOUR, FOUR TO FIVE, FIVE TO SACRAL ONE, SACRAL ONE TO TWO INSTRUMENTATION WITH ROBOTIC ASSISTANCE  CYSTOSCOPY FLEXIBLE on 2/27/23. XR Lumbar Spine 2 or 3 VWS 4/12/23    Today, Jermaine Denis reports lower back soreness and stiffness with out radiating pain, numbness and tingling down bilateral legs.  He denies loss of bowel/bladder incontinence. He reports no use of ice/heat. He reports completed his physical therapy with relief.        Subjective:     History of Present Illness  Jermaine continues to do well.  He has been walking and ambulating well.  He no longer needs a cane to ambulate.  He denies any radiculopathy or significant back pain.  He does have some back stiffness in the morning but is not nearly as bad as his issues prior to surgery.      Review of Systems   Constitutional: Negative for activity change.   HENT: Negative.    Eyes: Negative.    Respiratory: Negative for chest tightness and shortness of breath.    Gastrointestinal: Negative for constipation.   Genitourinary: Negative for difficulty urinating and enuresis.   Musculoskeletal: Positive for back pain and gait problem.   Neurological: Positive for weakness. Negative for numbness.   Psychiatric/Behavioral: Negative for sleep disturbance.        While in the room and during my examination of the patient I wore a mask and eye protection.  I washed my hands before and after this patient encounter.  The patient was also wearing a mask.    The following portions of the patient's history were reviewed and updated as appropriate: allergies, current medications, past family history, past medical history, past social history, past surgical history and problem list.     Objective:    Vitals:    04/14/23 1048   BP: 118/64   Pulse: 68    SpO2: 96%     There is no height or weight on file to calculate BMI.    Physical Exam  Constitutional:       Appearance: Normal appearance.   HENT:      Head: Normocephalic and atraumatic.   Eyes:      Extraocular Movements: Extraocular movements intact.      Conjunctiva/sclera: Conjunctivae normal.      Pupils: Pupils are equal, round, and reactive to light.   Cardiovascular:      Rate and Rhythm: Normal rate and regular rhythm.      Pulses: Normal pulses.   Pulmonary:      Breath sounds: Normal breath sounds.   Abdominal:      Palpations: Abdomen is soft.   Musculoskeletal:         General: Normal range of motion.      Cervical back: Normal range of motion and neck supple.   Skin:     General: Skin is warm and dry.   Neurological:      Mental Status: He is alert and oriented to person, place, and time.      Cranial Nerves: Cranial nerves 2-12 are intact.      Motor: Motor function is intact. No weakness or atrophy.      Coordination: Coordination is intact. Romberg sign negative. Romberg Test normal.      Gait: Gait is intact. Gait normal.      Deep Tendon Reflexes: Reflexes are normal and symmetric.      Reflex Scores:       Tricep reflexes are 2+ on the right side and 2+ on the left side.       Bicep reflexes are 2+ on the right side and 2+ on the left side.       Brachioradialis reflexes are 2+ on the right side and 2+ on the left side.       Patellar reflexes are 2+ on the right side and 2+ on the left side.       Achilles reflexes are 2+ on the right side and 2+ on the left side.  Psychiatric:         Speech: Speech normal.       Neurologic Exam     Mental Status   Oriented to person, place, and time.   Attention: normal. Concentration: normal.   Speech: speech is normal   Level of consciousness: alert    Cranial Nerves   Cranial nerves II through XII intact.     CN III, IV, VI   Pupils are equal, round, and reactive to light.    Motor Exam   Muscle bulk: normal  Overall muscle tone: normal    Strength    Strength 5/5 except as noted.     Sensory Exam   Light touch normal.     Gait, Coordination, and Reflexes     Gait  Gait: normal    Coordination   Romberg: negative    Reflexes   Reflexes 2+ except as noted.   Right brachioradialis: 2+  Left brachioradialis: 2+  Right biceps: 2+  Left biceps: 2+  Right triceps: 2+  Left triceps: 2+  Right patellar: 2+  Left patellar: 2+  Right achilles: 2+  Left achilles: 2+       Results: X-ray of the lumbar spine shows intact spinal hardware with good alignment    Assessment/Plan: Jermaine is doing well.  He is okay to lift up to 30 pounds.  He is to avoid excessive twisting bending stretching.  I did  him that he has not yet okay to ride a lawnmower or to fix his porch by himself with power tools.  I would like to see him back in 6 weeks with another x-ray.       Diagnoses and all orders for this visit:    1. Spondylolisthesis, lumbar region (Primary)  -     XR Spine Lumbar 2 or 3 View; Future    2. Synovial cyst of lumbar facet joint  -     XR Spine Lumbar 2 or 3 View; Future                Indio Blas MD  04/14/23  11:24 EDT

## 2023-04-14 ENCOUNTER — OFFICE VISIT (OUTPATIENT)
Dept: NEUROSURGERY | Facility: CLINIC | Age: 78
End: 2023-04-14
Payer: OTHER GOVERNMENT

## 2023-04-14 VITALS — HEART RATE: 68 BPM | DIASTOLIC BLOOD PRESSURE: 64 MMHG | OXYGEN SATURATION: 96 % | SYSTOLIC BLOOD PRESSURE: 118 MMHG

## 2023-04-14 DIAGNOSIS — M43.16 SPONDYLOLISTHESIS, LUMBAR REGION: Primary | ICD-10-CM

## 2023-04-14 DIAGNOSIS — M71.38 SYNOVIAL CYST OF LUMBAR FACET JOINT: ICD-10-CM

## 2023-04-14 PROCEDURE — 99024 POSTOP FOLLOW-UP VISIT: CPT | Performed by: NEUROLOGICAL SURGERY

## 2023-05-24 ENCOUNTER — HOSPITAL ENCOUNTER (OUTPATIENT)
Dept: GENERAL RADIOLOGY | Facility: HOSPITAL | Age: 78
Discharge: HOME | End: 2023-05-24
Admitting: NEUROLOGICAL SURGERY
Payer: MEDICAID

## 2023-05-24 DIAGNOSIS — M71.38 SYNOVIAL CYST OF LUMBAR FACET JOINT: ICD-10-CM

## 2023-05-24 DIAGNOSIS — M43.16 SPONDYLOLISTHESIS, LUMBAR REGION: ICD-10-CM

## 2023-05-24 PROCEDURE — 72100 X-RAY EXAM L-S SPINE 2/3 VWS: CPT

## 2023-05-25 ENCOUNTER — TELEPHONE (OUTPATIENT)
Dept: NEUROSURGERY | Facility: CLINIC | Age: 78
End: 2023-05-25
Payer: OTHER GOVERNMENT

## 2023-05-25 NOTE — TELEPHONE ENCOUNTER
Called patient. Informed patient that [facility] to continue to be [facility] by . Informed patient that his appt tomorrow will be cancelled until the new packet comes in from the [facility]. Once the packet comes in we will put him back on the schedule. Patient agreed,

## 2023-06-02 ENCOUNTER — TELEPHONE (OUTPATIENT)
Dept: NEUROSURGERY | Facility: CLINIC | Age: 78
End: 2023-06-02

## 2023-06-02 NOTE — TELEPHONE ENCOUNTER
Called patient to inform him that his [facility] auth was sent to office. Scheduled patient for 06/16 at 9 am.

## 2023-06-05 NOTE — TELEPHONE ENCOUNTER
REASON FOR SENDING: SO PROVIDER IS AWARE    DOCUMENT DESCRIPTION: [facility] AUTH FOR CONTINUITY OF CARE 5/31/23   AUTH # [id number]    NAME OF SENDER: [facility]    DATE INDEXED: 6/5/23

## 2023-06-16 ENCOUNTER — OFFICE VISIT (OUTPATIENT)
Dept: NEUROSURGERY | Facility: CLINIC | Age: 78
End: 2023-06-16
Payer: OTHER GOVERNMENT

## 2023-06-16 VITALS — OXYGEN SATURATION: 96 % | DIASTOLIC BLOOD PRESSURE: 68 MMHG | HEART RATE: 66 BPM | SYSTOLIC BLOOD PRESSURE: 132 MMHG

## 2023-06-16 DIAGNOSIS — M43.16 SPONDYLOLISTHESIS, LUMBAR REGION: Primary | ICD-10-CM

## 2023-06-16 PROCEDURE — 99024 POSTOP FOLLOW-UP VISIT: CPT | Performed by: NEUROLOGICAL SURGERY

## 2023-07-20 ENCOUNTER — TELEPHONE (OUTPATIENT)
Dept: NEUROSURGERY | Facility: OTHER | Age: 78
End: 2023-07-20

## 2023-07-20 NOTE — TELEPHONE ENCOUNTER
PATIENT CALLED IN AND GOT A BILL FOR $809.20 FOR THE XRAY ON 5/24- WAS CALLING TO GET AN UPDATE ON WHY HE GOT BILLED FOR THIS [facility]STEAD OF [facility]- PLEASE ADVISE- THANK YOU     C/B- [phone]- ANYTIME IS OKAY- OKAY TO M.

## 2023-07-24 ENCOUNTER — TELEPHONE (OUTPATIENT)
Dept: NEUROSURGERY | Facility: CLINIC | Age: 78
End: 2023-07-24
Payer: OTHER GOVERNMENT

## 2023-07-24 NOTE — TELEPHONE ENCOUNTER
Called [facility] office for [facility] who deals with potential retro-auth. Still per [facility] they do not do retro-auth. Left Vm to see if Tasha would consider it since it is only 6 days. This is my 2nd call to the [facility].

## 2023-07-25 ENCOUNTER — TELEPHONE (OUTPATIENT)
Dept: NEUROSURGERY | Facility: CLINIC | Age: 78
End: 2023-07-25
Payer: OTHER GOVERNMENT

## 2023-07-25 NOTE — TELEPHONE ENCOUNTER
Called patient. Informed patient that I have been working with the [facility] to get the retro-auth for 6 days. Informed patient per my conversation with XRAY, the $800 bill is the hospital portion. $56 is the amount for the XRAY per XRAY rep Donna. Informed patient that my supervisor Sincere is aware of the issue and stated for patient to hold any of his bills. She is going to speak with billing to put the bills on hold to see if we can get a retro-auth. Patient stated he understood.

## 2023-08-15 ENCOUNTER — TELEPHONE (OUTPATIENT)
Dept: NEUROSURGERY | Facility: CLINIC | Age: 78
End: 2023-08-15
Payer: OTHER GOVERNMENT

## 2023-08-15 NOTE — TELEPHONE ENCOUNTER
Called and I was finally able to speak with [facility] at the [facility]. She stated she put in the retro-auth to be reviewed by team. Will see if they will approve it to go back to 05/24/2023 to cover XRAY. Sent message to manager Sincere Mora to update billing department that has patient bill on hold.

## 2023-08-18 NOTE — PROGRESS NOTES
Outgoing call regarding Humira refill. Informed patient there are no more refills remaining and we will send a refill request to the [facility]. Pt is due for injection on 8/24. Will call pt when refill approved.Pt said he has an appt with the [facility] on Tuesday and will let he know.     Patient ID: Jermaine Denis is a 78 y.o. male is here today for 3 month follow-up with a new XR.    Today     Subjective     The patient is here in regards to   Chief Complaint   Patient presents with    Back Pain       History of Present Illness  Overall Jermaine is doing well he does have some back stiffness and soreness along his incision that has gradually improved.  He denies any radiculopathy.  He is ambulating well with a cane.    While in the room and during my examination of the patient I wore a mask and eye protection.  I washed my hands before and after this patient encounter.  The patient was also wearing a mask.    The following portions of the patient's history were reviewed and updated as appropriate: allergies, current medications, past family history, past medical history, past social history, past surgical history and problem list.    Review of Systems   Musculoskeletal:  Positive for back pain and neck stiffness.      Past Medical History:   Diagnosis Date    Anemia     Arthritis     BPH (benign prostatic hyperplasia)     Cancer     Diabetes mellitus     no meds now diet controlled was on insulin and then meds    GERD (gastroesophageal reflux disease)     High cholesterol     History of bladder cancer     3 YR AGO    History of COVID-19     PT CAN NOT REMEMBER WHEN    History of hepatitis C     treated medically    History of infection     AFTER KNEE SURGERY ON ANTIBIOTICS DAILY    History of kidney stones     History of transfusion     Hypertension     Low back pain     Neck pain     Neuropathy     Psoriasis     Right leg pain     Sleep apnea     NOT CURRENTLY USING MACHINE    Urethral stricture     caths self prn    UTI (urinary tract infection)     freq uti       Allergies   Allergen Reactions    Sulfa Antibiotics Hives       Family History   Problem Relation Age of Onset    Malig Hyperthermia Neg Hx     Colon cancer Neg Hx        Social History     Socioeconomic History    Marital status:     Tobacco Use    Smoking status: Former     Years: 15.00     Types: Cigarettes     Quit date:      Years since quittin.4    Smokeless tobacco: Never   Vaping Use    Vaping Use: Never used   Substance and Sexual Activity    Alcohol use: Yes     Comment: bourbon/beer on occasion    Drug use: No    Sexual activity: Defer       Past Surgical History:   Procedure Laterality Date    ANKLE SURGERY Right     orif with hardware    CARPAL TUNNEL RELEASE Left     COLONOSCOPY      COLONOSCOPY N/A 2022    Procedure: COLONOSCOPY FOR SCREENING;  Surgeon: Mik Joaquin MD;  Location: [facility] ENDOSCOPY;  Service: Gastroenterology;  Laterality: N/A;  DIVERTICULOSIS, hemorrhoids    CYSTOSCOPY N/A 2023    Procedure: CYSTOSCOPY FLEXIBLE, insertion of jean baptiste catheter with urethral dilation;  Surgeon: Jermain Duran MD;  Location: [facility] OR;  Service: Urology;  Laterality: N/A;    ENDOSCOPY      GASTRIC BYPASS          HAND SURGERY Right     thumb     KNEE ARTHROPLASTY Left     LUMBAR FUSION N/A 2023    Procedure: LUMBAR THREE TO FOUR, FOUR TO FIVE TRANSFORAMINAL LUMBAR INTERBODY FUSION WITH LUMBAR THREE TO FOUR, FOUR TO FIVE, FIVE TO SACRAL ONE, SACRAL ONE TO TWO INSTRUMENTATION WITH ROBOTIC ASSISTANCE;  Surgeon: Indio Blas MD;  Location: [facility] OR;  Service: Robotics - Neuro;  Laterality: N/A;    ORIF WRIST FRACTURE Left     TOTAL SHOULDER ARTHROPLASTY W/ DISTAL CLAVICLE EXCISION Right 2018    Procedure: Reverse Total Shoulder Arthroplasty;  Surgeon: Alex Ordaz MD;  Location: [facility];  Service:     TOTAL SHOULDER ARTHROPLASTY W/ DISTAL CLAVICLE EXCISION Left 2018    Procedure: TOTAL SHOULDER REVERSE ARTHROPLASTY;  Surgeon: Alex Ordaz MD;  Location: [facility];  Service: Orthopedics         Objective     Vitals:    23 0844   BP: 132/68   Pulse: 66   SpO2: 96%     There is no height or weight on file to calculate BMI.    Physical  Exam    Neurologic Exam    Assessment & Plan   Independent Review of Radiographic Studies:      I personally reviewed the images from the following studies.    XR: XR of the lumbar spine was reviewed and shows stable instrumentation with no obvious hardware malfunction.  Preserved lordosis    Assessment/Plan: Jermaine is doing well.  His x-rays look good.  I would like to see him back in 3 months with final x-rays.  He is cleared to lift any weight, cleared to drive, cleared for exercise at the gym.    Medical Decision Making:      X-ray scoliosis  X-ray lumbar spine         Diagnoses and all orders for this visit:    1. Spondylolisthesis, lumbar region (Primary)  -     XR Spine Lumbar 2 or 3 View; Future  -     XR Spine Scoliosis 2 or 3 Views; Future             Patient Instructions/Recommendations:    Follow-up in 3 months      Indio Blas MD  06/16/23  09:16 EDT

## 2023-09-01 ENCOUNTER — TELEPHONE (OUTPATIENT)
Dept: NEUROSURGERY | Facility: CLINIC | Age: 78
End: 2023-09-01
Payer: OTHER GOVERNMENT

## 2023-09-01 NOTE — TELEPHONE ENCOUNTER
KIERRA CALLED AND STATES THE AUTH IS BEING SENT [id number].  KIERRA STATES THAT THE DATES WILL NOW BE 05/24- 180 DAYS FROM THIS DATE.  STATES AUTH SHOULD BE FAXED BY TUESDAY 9/5/2023.

## 2023-09-01 NOTE — TELEPHONE ENCOUNTER
[facility] to see [facility] the [facility] is for [facility] to cover the date of 05/24/2023. Spoke with Tasha Pruett she stated the date was retro-auth, packet should be to us next week.

## 2023-09-08 ENCOUNTER — HOSPITAL ENCOUNTER (OUTPATIENT)
Dept: GENERAL RADIOLOGY | Facility: HOSPITAL | Age: 78
Discharge: HOME | End: 2023-09-08
Payer: OTHER GOVERNMENT

## 2023-09-08 DIAGNOSIS — M43.16 SPONDYLOLISTHESIS, LUMBAR REGION: ICD-10-CM

## 2023-09-08 PROCEDURE — 72100 X-RAY EXAM L-S SPINE 2/3 VWS: CPT

## 2023-09-08 PROCEDURE — 72082 X-RAY EXAM ENTIRE SPI 2/3 VW: CPT

## 2023-09-14 NOTE — PROGRESS NOTES
Patient ID: Jermaine Denis is a 78 y.o. male is here today for 3 month follow-up with new XR Lumbar done on 09/08/2023.    Today     Subjective     The patient is here in regards to   Chief Complaint   Patient presents with    Follow-up     Upper groin pain with back pain. Swelling in left thigh area. And has had a fall in July because of pain and discomfort.        History of Present Illness  Jermaine is doing well overall.  He does complain of right-sided hip pain and some difficulty with balance when walking.  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  He uses a cane occasionally while walking.    While in the room and during my examination of the patient I wore a mask and eye protection.  I washed my hands before and after this patient encounter.  The patient was also wearing a mask.    The following portions of the patient's history were reviewed and updated as appropriate: allergies, current medications, past family history, past medical history, past social history, past surgical history and problem list.    Review of Systems   Musculoskeletal:  Positive for back pain, gait problem and joint swelling.      Past Medical History:   Diagnosis Date    Anemia     Arthritis     BPH (benign prostatic hyperplasia)     Cancer     Diabetes mellitus     no meds now diet controlled was on insulin and then meds    GERD (gastroesophageal reflux disease)     High cholesterol     History of bladder cancer     3 YR AGO    History of COVID-19     PT CAN NOT REMEMBER WHEN    History of hepatitis C     treated medically    History of infection     AFTER KNEE SURGERY ON ANTIBIOTICS DAILY    History of kidney stones     History of transfusion     Hypertension     Low back pain     Neck pain     Neuropathy     Psoriasis     Right leg pain     Sleep apnea     NOT CURRENTLY USING MACHINE    Urethral stricture     caths self prn    UTI (urinary tract infection)      freq uti       Allergies   Allergen Reactions    Sulfa Antibiotics Hives       Family History   Problem Relation Age of Onset    Malig Hyperthermia Neg Hx     Colon cancer Neg Hx        Social History     Socioeconomic History    Marital status:    Tobacco Use    Smoking status: Former     Years: 15.00     Types: Cigarettes     Quit date:      Years since quittin.    Smokeless tobacco: Never   Vaping Use    Vaping Use: Never used   Substance and Sexual Activity    Alcohol use: Yes     Comment: bourbon/beer on occasion    Drug use: No    Sexual activity: Defer       Past Surgical History:   Procedure Laterality Date    ANKLE SURGERY Right     orif with hardware    CARPAL TUNNEL RELEASE Left     COLONOSCOPY      COLONOSCOPY N/A 2022    Procedure: COLONOSCOPY FOR SCREENING;  Surgeon: Mik Joaquin MD;  Location: [facility] ENDOSCOPY;  Service: Gastroenterology;  Laterality: N/A;  DIVERTICULOSIS, hemorrhoids    CYSTOSCOPY N/A 2023    Procedure: CYSTOSCOPY FLEXIBLE, insertion of jean baptiste catheter with urethral dilation;  Surgeon: Jermain Duran MD;  Location: [facility];  Service: Urology;  Laterality: N/A;    ENDOSCOPY      GASTRIC BYPASS          HAND SURGERY Right     thumb     KNEE ARTHROPLASTY Left     LUMBAR FUSION N/A 2023    Procedure: LUMBAR THREE TO FOUR, FOUR TO FIVE TRANSFORAMINAL LUMBAR INTERBODY FUSION WITH LUMBAR THREE TO FOUR, FOUR TO FIVE, FIVE TO SACRAL ONE, SACRAL ONE TO TWO INSTRUMENTATION WITH ROBOTIC ASSISTANCE;  Surgeon: Indio Blas MD;  Location: [facility];  Service: Robotics - Neuro;  Laterality: N/A;    ORIF WRIST FRACTURE Left     TOTAL SHOULDER ARTHROPLASTY W/ DISTAL CLAVICLE EXCISION Right 2018    Procedure: Reverse Total Shoulder Arthroplasty;  Surgeon: Alex Ordaz MD;  Location: [facility];  Service:     TOTAL SHOULDER ARTHROPLASTY W/ DISTAL CLAVICLE EXCISION Left 2018    Procedure: TOTAL SHOULDER REVERSE  ARTHROPLASTY;  Surgeon: Alex Ordaz MD;  Location: [facility];  Service: Orthopedics         Objective     Vitals:    09/15/23 1038   BP: 122/58   Pulse: 62   SpO2: 96%     There is no height or weight on file to calculate BMI.    Physical Exam  Constitutional:       Appearance: Normal appearance.   HENT:      Head: Normocephalic and atraumatic.   Eyes:      Extraocular Movements: Extraocular movements intact.      Conjunctiva/sclera: Conjunctivae normal.      Pupils: Pupils are equal, round, and reactive to light.   Cardiovascular:      Rate and Rhythm: Normal rate and regular rhythm.      Pulses: Normal pulses.   Pulmonary:      Breath sounds: Normal breath sounds.   Abdominal:      Palpations: Abdomen is soft.   Musculoskeletal:         General: Normal range of motion.      Cervical back: Normal range of motion and neck supple.   Skin:     General: Skin is warm and dry.   Neurological:      Mental Status: He is alert and oriented to person, place, and time.      Cranial Nerves: Cranial nerves 2-12 are intact.      Motor: Motor function is intact. No weakness or atrophy.      Coordination: Coordination is intact. Romberg sign negative. Romberg Test normal.      Gait: Gait is intact. Gait normal.      Deep Tendon Reflexes: Reflexes are normal and symmetric.      Reflex Scores:       Tricep reflexes are 2+ on the right side and 2+ on the left side.       Bicep reflexes are 2+ on the right side and 2+ on the left side.       Brachioradialis reflexes are 2+ on the right side and 2+ on the left side.       Patellar reflexes are 2+ on the right side and 2+ on the left side.       Achilles reflexes are 2+ on the right side and 2+ on the left side.  Psychiatric:         Speech: Speech normal.       Neurologic Exam     Mental Status   Oriented to person, place, and time.   Attention: normal. Concentration: normal.   Speech: speech is normal   Level of consciousness: alert    Cranial Nerves   Cranial nerves II  through XII intact.     CN III, IV, VI   Pupils are equal, round, and reactive to light.    Motor Exam   Muscle bulk: normal  Overall muscle tone: normal    Strength   Strength 5/5 except as noted.   Right anterior tibial: 3/5    Sensory Exam   Light touch normal.     Gait, Coordination, and Reflexes     Gait  Gait: normal    Coordination   Romberg: negative    Reflexes   Reflexes 2+ except as noted.   Right brachioradialis: 2+  Left brachioradialis: 2+  Right biceps: 2+  Left biceps: 2+  Right triceps: 2+  Left triceps: 2+  Right patellar: 2+  Left patellar: 2+  Right achilles: 2+  Left achilles: 2+    Assessment & Plan   Independent Review of Radiographic Studies:      I personally reviewed the images from the following studies.    XR: XR of the lumbar spine was reviewed and shows intact hardware and alignment    Assessment/Plan: Jermaine is cleared for normal activity.  I think is recovered well from spine surgery and overall does not have any radiculopathy or back pain.  I do think that he has some hip issues which warrant investigation with x-ray and he benefit from physical therapy.    Medical Decision Making:      X-ray of the hip  Physical therapy         Diagnoses and all orders for this visit:    1. Pain of right hip (Primary)  -     XR Hips Bilateral With or Without Pelvis 2 View; Future  -     Ambulatory Referral to Physical Therapy    2. Lumbar radiculopathy  -     XR Hips Bilateral With or Without Pelvis 2 View; Future  -     Ambulatory Referral to Physical Therapy    3. Spondylolisthesis, lumbar region  -     XR Hips Bilateral With or Without Pelvis 2 View; Future  -     Ambulatory Referral to Physical Therapy             Patient Instructions/Recommendations:    Follow-up as needed      Indio Blas MD  09/15/23  10:53 EDT

## 2023-09-15 ENCOUNTER — HOSPITAL ENCOUNTER (OUTPATIENT)
Dept: GENERAL RADIOLOGY | Facility: HOSPITAL | Age: 78
Discharge: HOME | End: 2023-09-15
Admitting: NEUROLOGICAL SURGERY
Payer: OTHER GOVERNMENT

## 2023-09-15 ENCOUNTER — OFFICE VISIT (OUTPATIENT)
Dept: NEUROSURGERY | Facility: CLINIC | Age: 78
End: 2023-09-15
Payer: OTHER GOVERNMENT

## 2023-09-15 VITALS — OXYGEN SATURATION: 96 % | DIASTOLIC BLOOD PRESSURE: 58 MMHG | SYSTOLIC BLOOD PRESSURE: 122 MMHG | HEART RATE: 62 BPM

## 2023-09-15 DIAGNOSIS — M54.16 LUMBAR RADICULOPATHY: ICD-10-CM

## 2023-09-15 DIAGNOSIS — M25.551 PAIN OF RIGHT HIP: Primary | ICD-10-CM

## 2023-09-15 DIAGNOSIS — M25.551 BILATERAL HIP PAIN: Primary | ICD-10-CM

## 2023-09-15 DIAGNOSIS — M43.16 SPONDYLOLISTHESIS, LUMBAR REGION: ICD-10-CM

## 2023-09-15 DIAGNOSIS — M25.552 BILATERAL HIP PAIN: Primary | ICD-10-CM

## 2023-09-15 DIAGNOSIS — M25.551 PAIN OF RIGHT HIP: ICD-10-CM

## 2023-09-15 PROCEDURE — 73521 X-RAY EXAM HIPS BI 2 VIEWS: CPT

## 2023-09-18 ENCOUNTER — TELEPHONE (OUTPATIENT)
Dept: NEUROSURGERY | Facility: CLINIC | Age: 78
End: 2023-09-18
Payer: OTHER GOVERNMENT

## 2023-09-18 NOTE — TELEPHONE ENCOUNTER
Called patient to inform him that  is referring him to [facility] for his hip pain per his XRAY. Informed patient that we first need to [facility] before [facility] can be [facility]. [facility]. Received confirmation of success.

## 2023-09-21 ENCOUNTER — TREATMENT (OUTPATIENT)
Dept: PHYSICAL THERAPY | Facility: CLINIC | Age: 78
End: 2023-09-21
Payer: OTHER GOVERNMENT

## 2023-09-21 DIAGNOSIS — R53.1 LACK OF STRENGTH: ICD-10-CM

## 2023-09-21 DIAGNOSIS — M25.652 HIP JOINT STIFFNESS, BILATERAL: ICD-10-CM

## 2023-09-21 DIAGNOSIS — M25.651 HIP JOINT STIFFNESS, BILATERAL: ICD-10-CM

## 2023-09-21 DIAGNOSIS — M25.552 BILATERAL HIP PAIN: Primary | ICD-10-CM

## 2023-09-21 DIAGNOSIS — M25.551 BILATERAL HIP PAIN: Primary | ICD-10-CM

## 2023-09-21 DIAGNOSIS — R26.9 GAIT DISTURBANCE: ICD-10-CM

## 2023-09-21 NOTE — PROGRESS NOTES
Physical Therapy Initial Evaluation and Plan of [place] [address]    Patient: Jermaine Denis   : 1945  Diagnosis/ICD-10 Code:  Bilateral hip pain [M25.551, M25.552]  Referring practitioner: Indio ALDANA MD  Date of Initial Visit: 2023  Today's Date: 2023  Patient seen for 1 sessions           Subjective Questionnaire: LEFS: 83% limitation      Subjective Evaluation    History of Present Illness  Mechanism of injury: Pt had lumbar fusion earlier this year. Since recovering from that it is now apparent that all of his LE pain was not just from the back. Pt has bilateral hip pain and the right is the worst. Pt has to walk with a cane due to pain. Pain is in the groin and also the buttock. Some radiation of pain down to knee.   Pt now also having trouble sleeping on his right side.     Pain  Current pain ratin  At best pain ratin  Quality: dull ache  Relieving factors: rest and change in position  Aggravating factors: ambulation    Diagnostic Tests  X-ray: abnormal (arthritis in bilateral hips)           Objective          Observations     Additional Hip Observation Details  Pt stands with both hips in moderate external rotation.     Neurological Testing     Sensation     Hip     Right Hip   Diminished: light touch and sharp/dull discrimination    Active Range of Motion   Left Hip   Normal active range of motion    Right Hip   Flexion: 85 degrees with pain  Extension: 5 degrees   Abduction: 40 degrees   Adduction: 5 degrees with pain    Strength/Myotome Testing     Left Hip   Planes of Motion   Flexion: 4  Extension: 4  Abduction: 4  Adduction: 4    Right Hip   Planes of Motion   Flexion: 3  Extension: 3+  Abduction: 3+  Adduction: 3+    Ambulation     Ambulation: Level Surfaces   Ambulation with assistive device: independent  Ambulation without assistive device: contact guard assist    Additional Level Surfaces Ambulation  Details  Pt uses a straight cane for ambulation.     Ambulation: Stairs   Ascend stairs: independent  Pattern: non-reciprocal  Railings: one rail  Descend stairs: independent  Pattern: non-reciprocal  Railings: one rail    Observational Gait   Gait: antalgic   Decreased walking speed and stride length.     Functional Assessment   Squat   Pain.     Comments  Pt struggles with sit to stand from standard chair unless he has 2 arm rests to push up from.     See Exercise, Manual, and Modality Logs for complete treatment.     Assessment & Plan       Assessment  Impairments: abnormal gait, abnormal muscle firing, abnormal or restricted ROM, activity intolerance, impaired balance, impaired physical strength, lacks appropriate home exercise program, pain with function, safety issue and weight-bearing intolerance   Functional limitations: walking, uncomfortable because of pain, moving in bed, standing and stooping   Assessment details: Pt presents with considerable pain in right hip and lesser degree of pain in left hip. 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  Pt has limitations with ROM and very significant lack of strength, particularly in right LE. Pt will benefit from skilled PT to address these limitations and help improve his functional status.   Prognosis: good    Goals  Plan Goals: HIP PROBLEMS    1. The patient complains of right hip pain.   LTG 1: 4 weeks:  The patient will report a pain rating of 4/10 or better in order to improve tolerance to activities of daily living and improve sleep quality.    STATUS:  New   STG 1a: 2 weeks:  The patient will report a pain rating of 5/10 or better.    STATUS:  New    2. The patient demonstrates weakness of the right hip.   LTG 2: 4 weeks:  The patient will demonstrate 4/5 strength for  hip flexion, abduction,  and extension in order to improve hip stability.    STATUS:  New   STG 2a: 2 weeks:  The  patient will demonstrate 3+/5 strength for  hip flexion, abduction,  and extension.    STATUS:  New    3. The patient has gait dysfunction.  LTG 3: 12 weeks:  The patient will ambulate without assistive device, independently, for community distances with minimal limp to the right lower extremity in order to improve mobility and allow patient to perform activities such as grocery shopping with greater ease.  STATUS:  New  STG 3a: The patient will be independent in HEP.  STATUS:  New    4. Mobility: Walking/Moving Around Functional Limitation    LTG 4: 4 weeks:  The patient will demonstrate 60% limitation as measured on the Lower Extremity Functional Scale.  STATUS:  New  STG 4a: 2 weeks:  The patient will demonstrate 70% limitation as measured on the Lower Extremity Functional Scale.    STATUS:  New     Plan  Therapy options: will be seen for skilled therapy services  Planned modality interventions: cryotherapy, electrical stimulation/Russian stimulation and TENS  Planned therapy interventions: balance/weight-bearing training, ADL retraining, soft tissue mobilization, strengthening, stretching, therapeutic activities, joint mobilization, home exercise program, gait training, functional ROM exercises, flexibility, body mechanics training, postural training, neuromuscular re-education and manual therapy  Frequency: 1x week  Duration in weeks: 12  Treatment plan discussed with: patient      Visit Diagnoses:    ICD-10-CM ICD-9-CM   1. Bilateral hip pain  M25.551 719.45    M25.552    2. Lack of strength  R53.1 780.79   3. Hip joint stiffness, bilateral  M25.651 719.55    M25.652    4. Gait disturbance  R26.9 781.2       History # of Personal Factors and/or Comorbidities: LOW (0)  Examination of Body System(s): # of elements: LOW (1-2)  Clinical Presentation: STABLE   Clinical Decision Making: LOW       Timed:         Manual Therapy:    0     mins  83306;     Therapeutic Exercise:    25     mins  24506;     Neuromuscular  Chang:    0    mins  85575;    Therapeutic Activity:     0     mins  98780;     Gait Trainin     mins  36550;     Ultrasound:     0     mins  40756;    Ionto                               0    mins   47866  Self Care                       0     mins   04449  Canalith Repos    0     mins 68441      Un-Timed:  Electrical Stimulation:    0     mins  51139 ( );  Dry Needling     0     mins self-pay  Traction     0     mins 81125  Low Eval     25    Mins  41785  Mod Eval     0     Mins  76822  High Eval                       0     Mins  68996  Re-Eval                           0    mins  10029    Timed Treatment:   25   mins   Total Treatment:     50   mins    PT SIGNATURE: Mik Everett PT    Electronically signed 2023    [place] License: PT - [id number]      Initial Certification  Certification Period: 2023 thru 2023  I certify that the therapy services are furnished while this patient is under my care.  The services outlined above are required by this patient, and will be reviewed every 90 days.     PHYSICIAN: Indio Blas MD  NPI: [id number]      DATE:     Please sign and return via fax to [phone]. Thank you, [facility].

## 2023-09-25 ENCOUNTER — TELEPHONE (OUTPATIENT)
Dept: NEUROSURGERY | Facility: CLINIC | Age: 78
End: 2023-09-25

## 2023-09-25 NOTE — TELEPHONE ENCOUNTER
[facility] received a fax that requires your attention. The document has been indexed to the patient’s chart for your review.      Reason for sending: [facility] AUTH FOR 09/20/23    Documents Description: [facility] AUTH_KARLY PINEDA [facility]_09/20/23    Name of Sender: KARLY DAUGHERTY [facility]    Date Indexed: 09/25/23    Notes (if needed): [facility] AUTH TO J

## 2023-09-27 ENCOUNTER — TREATMENT (OUTPATIENT)
Dept: PHYSICAL THERAPY | Facility: CLINIC | Age: 78
End: 2023-09-27
Payer: OTHER GOVERNMENT

## 2023-09-27 DIAGNOSIS — M25.651 HIP JOINT STIFFNESS, BILATERAL: ICD-10-CM

## 2023-09-27 DIAGNOSIS — M25.552 BILATERAL HIP PAIN: Primary | ICD-10-CM

## 2023-09-27 DIAGNOSIS — M25.652 HIP JOINT STIFFNESS, BILATERAL: ICD-10-CM

## 2023-09-27 DIAGNOSIS — R26.9 GAIT DISTURBANCE: ICD-10-CM

## 2023-09-27 DIAGNOSIS — M25.551 BILATERAL HIP PAIN: Primary | ICD-10-CM

## 2023-09-27 DIAGNOSIS — R53.1 LACK OF STRENGTH: ICD-10-CM

## 2023-09-27 NOTE — PROGRESS NOTES
Physical Therapy Daily Treatment Note                        [facility]  [address] [place] [address]    Patient: Jermaine Denis   : 1945  Diagnosis/ICD-10 Code:  Bilateral hip pain [M25.551, M25.552]  Referring practitioner: Indio ALDANA MD  Date of Initial Visit: Type: THERAPY  Noted: 2023  Today's Date: 2023  Patient seen for 2 sessions           Subjective Evaluation    History of Present Illness    Subjective comment: Pt more sore today. Pt has been waking up at night due to anterior thigh soreness.       Objective   See Exercise, Manual, and Modality Logs for complete treatment.   Added more standing and sitting strengthening. Discontinued supine leg lift type of exercises.     Assessment/Plan    Pt having too much pain with supine leg lift exercises. Pt to see orthopod tomorrow regarding his hip. We will wait until he sees the doctor and determine whether we should continue with PT or if the doctor is recommending joint replacement.        Timed:  Manual Therapy:    0     mins  03251;  Therapeutic Exercise:    30     mins  44456;     Neuromuscular Chang:   0    mins  67493;    Therapeutic Activity:     0     mins  10495;     Gait Trainin     mins  30044;     Aquatics                         0      mins  63347    Un-timed:  Mechanical Traction      0     mins  28793  Dry Needling     0     mins self-pay  Electrical Stimulation:    0     mins  76096 ( );      Timed Treatment:   30   mins   Total Treatment:     30   mins    Mik Everett PT  Physical Therapist    Electronically signed 2023    [place] License: PT - [id number]

## 2023-09-28 ENCOUNTER — OFFICE VISIT (OUTPATIENT)
Dept: ORTHOPEDIC SURGERY | Facility: CLINIC | Age: 78
End: 2023-09-28
Payer: OTHER GOVERNMENT

## 2023-09-28 VITALS — WEIGHT: 223.3 LBS | TEMPERATURE: 98.2 F | HEIGHT: 64 IN | BODY MASS INDEX: 38.12 KG/M2

## 2023-09-28 DIAGNOSIS — M16.11 PRIMARY OSTEOARTHRITIS OF RIGHT HIP: Primary | ICD-10-CM

## 2023-09-28 PROCEDURE — 99204 OFFICE O/P NEW MOD 45 MIN: CPT | Performed by: ORTHOPAEDIC SURGERY

## 2023-10-03 ENCOUNTER — TREATMENT (OUTPATIENT)
Dept: PHYSICAL THERAPY | Facility: CLINIC | Age: 78
End: 2023-10-03
Payer: OTHER GOVERNMENT

## 2023-10-03 DIAGNOSIS — M25.651 HIP JOINT STIFFNESS, BILATERAL: ICD-10-CM

## 2023-10-03 DIAGNOSIS — M25.552 BILATERAL HIP PAIN: Primary | ICD-10-CM

## 2023-10-03 DIAGNOSIS — R26.9 GAIT DISTURBANCE: ICD-10-CM

## 2023-10-03 DIAGNOSIS — M25.551 BILATERAL HIP PAIN: Primary | ICD-10-CM

## 2023-10-03 DIAGNOSIS — R53.1 LACK OF STRENGTH: ICD-10-CM

## 2023-10-03 DIAGNOSIS — M25.652 HIP JOINT STIFFNESS, BILATERAL: ICD-10-CM

## 2023-10-03 NOTE — PROGRESS NOTES
Physical Therapy Daily Treatment Note                        [facility]  [address] [place] [address]    Patient: Jermaine Denis   : 1945  Diagnosis/ICD-10 Code:  Bilateral hip pain [M25.551, M25.552]  Referring practitioner: Indio ALDANA MD  Date of Initial Visit: Type: THERAPY  Noted: 2023  Today's Date: 10/3/2023  Patient seen for 3 sessions           Subjective Evaluation    History of Present Illness    Subjective comment: Pt feeling OK. Pt is to cont with PT for strengthening until he returns to MD and decides whether to have hip replacement or not. MD said patient is a good candidate for surgery to improve his hip pain.       Objective   See Exercise, Manual, and Modality Logs for complete treatment.   Adding sit to stand and bridges.     Assessment/Plan     Pt performed all therapeutic exercises with good technique. Pt continues to have limitations with strength and flexibility. Pt will benefit from continued PT to address these limitations.      Timed:  Therapeutic Exercise:    15    mins  08911;     Therapeutic Activity:     15     mins  20245;   Manual Therapy:    0     mins  53138;    Gait Trainin     mins  60330;     Neuromuscular Chang:   0    mins  90576;    Un-timed:  Mechanical Traction      0     mins  23131  Dry Needling     0     mins self-pay  Electrical Stimulation:    0     mins  50796 ( );      Timed Treatment:   30   mins   Total Treatment:     30   mins    Mik Everett PT  Physical Therapist    Electronically signed 10/3/2023    [place] License: PT - [id number]

## 2023-10-05 ENCOUNTER — TREATMENT (OUTPATIENT)
Dept: PHYSICAL THERAPY | Facility: CLINIC | Age: 78
End: 2023-10-05
Payer: OTHER GOVERNMENT

## 2023-10-05 DIAGNOSIS — M25.552 BILATERAL HIP PAIN: Primary | ICD-10-CM

## 2023-10-05 DIAGNOSIS — M25.551 BILATERAL HIP PAIN: Primary | ICD-10-CM

## 2023-10-05 DIAGNOSIS — R26.9 GAIT DISTURBANCE: ICD-10-CM

## 2023-10-05 DIAGNOSIS — M25.651 HIP JOINT STIFFNESS, BILATERAL: ICD-10-CM

## 2023-10-05 DIAGNOSIS — R53.1 LACK OF STRENGTH: ICD-10-CM

## 2023-10-05 DIAGNOSIS — M25.652 HIP JOINT STIFFNESS, BILATERAL: ICD-10-CM

## 2023-10-05 NOTE — PROGRESS NOTES
Physical Therapy Daily Treatment Note                        [facility]  [address] [place] [address]    Patient: Jermaine Denis   : 1945  Diagnosis/ICD-10 Code:  No primary diagnosis found.  Referring practitioner: Indio ALDANA MD  Date of Initial Visit: Type: THERAPY  Noted: 2023  Today's Date: 10/5/2023  Patient seen for 4 sessions           Subjective Evaluation    History of Present Illness    Subjective comment: Pt reports hip is stiff. No significant change in pain.       Objective   See Exercise, Manual, and Modality Logs for complete treatment.   Increasing some sets and added LAQ for quad strengthening.     Assessment/Plan  Pt performed all therapeutic exercises with good technique. Pt continues to have limitations with strength and flexibility. Pt will benefit from continued PT to address these limitations.         Timed:  Therapeutic Exercise:    26     mins  78686;     Therapeutic Activity:     13     mins  19540;   Manual Therapy:    0     mins  51481;    Gait Trainin     mins  63529;     Neuromuscular Chang:   0    mins  35070;    Un-timed:  Mechanical Traction      0     mins  24354  Dry Needling     0     mins self-pay  Electrical Stimulation:    0     mins  36664 ( );      Timed Treatment:   39   mins   Total Treatment:     39   mins    Mik Everett, PT  Physical Therapist    Electronically signed 10/5/2023    [place] License: PT - [id number]

## 2023-10-08 NOTE — PROGRESS NOTES
Patient: Jermaine Denis    YOB: 1945    Medical Record Number: [id number]    Chief Complaints:  Right hip pain    History of Present Illness:     78 y.o. male patient who comes in today for evaluation of a new complaint of  righthip pain. Denies any discreet precipitating event or factor.   The pain is moderate,constant and aching.  The pain is worse with prolonged standing or walking.  Rest helps.  Patient states he has had bilateral hip pain right worse than the left.  Leg feels heavy.  He gets groin pain.  Did have recent surgery in March for his back consisting of spinal pelvic fusion.    Denies any shooting pain down the leg, weakness, numbness or paresthesias.  Denies any fever shortness of breath.    Allergies:   Allergies   Allergen Reactions    Sulfa Antibiotics Hives       Medications:     Home Medications:  Current Outpatient Medications on File Prior to Visit   Medication Sig Dispense Refill    acetaminophen (TYLENOL) 325 MG tablet Take 2 tablets by mouth Every 6 (Six) Hours As Needed for Mild Pain or Fever.      aspirin 81 MG EC tablet Take 1 tablet by mouth Every Other Day. HOLD PRIOR TO SURGERY PER MD      calcium citrate-vitamin d (CALCITRATE) 315-250 MG-UNIT tablet tablet Take 2 tablets by mouth 2 (Two) Times a Day. HOLD PRIOR TO SURG      cephalexin (KEFLEX) 500 MG capsule Take 1 capsule by mouth Every Morning.      cetirizine (zyrTEC) 10 MG tablet Take 1 tablet by mouth Daily As Needed for Allergies.      docusate sodium 100 MG capsule Take 100 mg by mouth 2 (Two) Times a Day.      ferrous sulfate 325 (65 FE) MG tablet Take 1 tablet by mouth 2 (Two) Times a Day.      finasteride (PROSCAR) 5 MG tablet Take 1 tablet by mouth Daily.      furosemide (LASIX) 20 MG tablet Take 1 tablet by mouth Every Morning.      gabapentin (NEURONTIN) 400 MG capsule Take 2 capsules by mouth 3 (Three) Times a Day.      glipizide (GLUCOTROL) 5 MG tablet Take 1 tablet by mouth 2 (Two) Times a Day Before  Meals.      hydrocortisone 2.5 % cream Apply 1 application  topically to the appropriate area as directed As Needed.      ketoconazole (NIZORAL) 2 % cream Apply 1 application  topically to the appropriate area as directed Daily.      ketoconazole (NIZORAL) 2 % shampoo Apply  topically to the appropriate area as directed 2 (Two) Times a Week.      methocarbamol (ROBAXIN) 500 MG tablet Take 1 tablet by mouth 4 (Four) Times a Day As Needed for Muscle Spasms. 40 tablet 1    Omega-3 Fatty Acids (FISH OIL) 1000 MG capsule capsule Take 1 capsule by mouth 3 (Three) Times a Day With Meals. HOLD PRIOR TO SURG      omeprazole (priLOSEC) 20 MG capsule Take 1 capsule by mouth Every Evening.      sennosides-docusate (PERICOLACE) 8.6-50 MG per tablet Take 1 tablet by mouth 2 (Two) Times a Day. 20 tablet 0    simvastatin (ZOCOR) 20 MG tablet Take 0.5 tablets by mouth Every Night.      vitamin B-12 (CYANOCOBALAMIN) 500 MCG tablet Take 1 tablet by mouth Every Other Day. HOLD PRIOR TO SURG       Current Facility-Administered Medications on File Prior to Visit   Medication Dose Route Frequency Provider Last Rate Last Admin    mupirocin (BACTROBAN) 2 % nasal ointment   Nasal BID Alex Ordaz MD         Past Medical History:   Diagnosis Date    Anemia     Arthritis     BPH (benign prostatic hyperplasia)     Cancer     Diabetes mellitus     no meds now diet controlled was on insulin and then meds    GERD (gastroesophageal reflux disease)     High cholesterol     History of bladder cancer     3 YR AGO    History of COVID-19     PT CAN NOT REMEMBER WHEN    History of hepatitis C     treated medically    History of infection     AFTER KNEE SURGERY ON ANTIBIOTICS DAILY    History of kidney stones     History of transfusion     Hypertension     Low back pain     Neck pain     Neuropathy     Psoriasis     Right leg pain     Sleep apnea     NOT CURRENTLY USING MACHINE    Urethral stricture     caths self prn    UTI (urinary tract infection)      freq uti     Past Surgical History:   Procedure Laterality Date    ANKLE SURGERY Right     orif with hardware    CARPAL TUNNEL RELEASE Left     COLONOSCOPY      COLONOSCOPY N/A 2022    Procedure: COLONOSCOPY FOR SCREENING;  Surgeon: Mik Joaquin MD;  Location: [facility];  Service: Gastroenterology;  Laterality: N/A;  DIVERTICULOSIS, hemorrhoids    CYSTOSCOPY N/A 2023    Procedure: CYSTOSCOPY FLEXIBLE, insertion of jean baptiste catheter with urethral dilation;  Surgeon: Jermain Duran MD;  Location: [facility] OR;  Service: Urology;  Laterality: N/A;    ENDOSCOPY      GASTRIC BYPASS          HAND SURGERY Right     thumb     KNEE ARTHROPLASTY Left     LUMBAR FUSION N/A 2023    Procedure: LUMBAR THREE TO FOUR, FOUR TO FIVE TRANSFORAMINAL LUMBAR INTERBODY FUSION WITH LUMBAR THREE TO FOUR, FOUR TO FIVE, FIVE TO SACRAL ONE, SACRAL ONE TO TWO INSTRUMENTATION WITH ROBOTIC ASSISTANCE;  Surgeon: Indio Blas MD;  Location: [facility];  Service: Robotics - Neuro;  Laterality: N/A;    ORIF WRIST FRACTURE Left     TOTAL SHOULDER ARTHROPLASTY W/ DISTAL CLAVICLE EXCISION Right 2018    Procedure: Reverse Total Shoulder Arthroplasty;  Surgeon: Alex Ordaz MD;  Location: [facility];  Service:     TOTAL SHOULDER ARTHROPLASTY W/ DISTAL CLAVICLE EXCISION Left 2018    Procedure: TOTAL SHOULDER REVERSE ARTHROPLASTY;  Surgeon: Alex Ordaz MD;  Location: [facility];  Service: Orthopedics     Social History     Occupational History    Not on file   Tobacco Use    Smoking status: Former     Years: 15     Types: Cigarettes     Quit date:      Years since quittin.7    Smokeless tobacco: Never   Vaping Use    Vaping Use: Never used   Substance and Sexual Activity    Alcohol use: Yes     Comment: bourbon/beer on occasion    Drug use: No    Sexual activity: Defer      Social History     Social History Narrative    Not on file     Family History   Problem Relation  "Age of Onset    Malig Hyperthermia Neg Hx     Colon cancer Neg Hx        Review of Systems:      Constitutional: Denies fever, shaking or chills         All other pertinent positives and negatives as noted above in HPI.    Physical Exam: 78 y.o. male  Vitals:    09/28/23 1041   Temp: 98.2 °F (36.8 °C)   TempSrc: Temporal   Weight: 101 kg (223 lb 4.8 oz)   Height: 162.6 cm (64.02\")     General:  Patient is awake and alert.  Appears in no acute distress or discomfort.        Right lower extremity:  Skin is benign.  No palpable masses or adenopathy.  No focal area of tenderness.  Hip motion is limited and uncomfortable.  Positive Stinchfield maneuver.  Good strength with hip flexion, extension, abduction.  Good strength distally with plantarflexion and dorsiflexion of ankle, toes.  Sensation to light touch intact distally in the lower leg and foot.  Good pedal pulses with brisk cap refill.    Radiology: AP pelvis, AP and lateral views of the right hip are ordered by myself and reviewed to evaluate the patient's complaint.  The x-rays show severe hip osteoarthritis with joint space narrowing, subchondral sclerosis and osteophyte formation.  There are no obvious acute abnormalities, lesions, masses, or other concerning findings.    Lumbar spine shows spinal pelvic fixation with instrumentation.    Comparison films not available    Assessment:  Right hip osteoarthritis      Plan:    I had a lengthy discussion with the patient regarding options, both surgical and non-surgical.  Patient has a severe degenerative changes of his hip.  I feel that a lot of his symptoms are coming from the severe hip arthritis.  He has post back surgery which could be potential cause for his leg feeling little bit heavy.  However again I feel a lot of his symptoms are hip joint related.  We discussed interventions he like to think about things.  He is considering surgery.  I told him I do think he would be a good candidate for total hip " replacement.  I discussed surgery with this patient including risk and benefits.   I did discuss risk and benefits with the patient with risk including but not limited to bleeding, infection, damage to nearby nerves, vessels, tendons, ligaments, continued pain, worsening pain, fracture, dislocation, leg length discrepancy, blood clots, even death with anesthesia and possible need for future procedures surgeries.    He would like to think about things.     I think for surgical planning he would benefit from dual mobility for improved hip stability.  He also has a larger pannus which would have to be evaluated for considering surgical approach.    Patient follow-up in 1 month      Renny Iqbal MD    09/28/2023    CC to Linh Mckeon MD

## 2023-10-10 ENCOUNTER — TREATMENT (OUTPATIENT)
Dept: PHYSICAL THERAPY | Facility: CLINIC | Age: 78
End: 2023-10-10
Payer: OTHER GOVERNMENT

## 2023-10-10 DIAGNOSIS — R53.1 LACK OF STRENGTH: ICD-10-CM

## 2023-10-10 DIAGNOSIS — M25.552 BILATERAL HIP PAIN: Primary | ICD-10-CM

## 2023-10-10 DIAGNOSIS — R26.9 GAIT DISTURBANCE: ICD-10-CM

## 2023-10-10 DIAGNOSIS — M25.651 HIP JOINT STIFFNESS, BILATERAL: ICD-10-CM

## 2023-10-10 DIAGNOSIS — M25.652 HIP JOINT STIFFNESS, BILATERAL: ICD-10-CM

## 2023-10-10 DIAGNOSIS — M25.551 BILATERAL HIP PAIN: Primary | ICD-10-CM

## 2023-10-10 PROCEDURE — 97530 THERAPEUTIC ACTIVITIES: CPT

## 2023-10-10 PROCEDURE — 97110 THERAPEUTIC EXERCISES: CPT

## 2023-10-10 NOTE — PROGRESS NOTES
Physical Therapy Daily Treatment Note                        [facility]  [address] [place] [address]    Patient: Jermaine Denis   : 1945  Diagnosis/ICD-10 Code:  Bilateral hip pain [M25.551, M25.552]  Referring practitioner: Indio ALDANA MD  Date of Initial Visit: Type: THERAPY  Noted: 2023  Today's Date: 10/10/2023  Patient seen for 5 sessions           Subjective Evaluation    History of Present Illness    Subjective comment: Pt feeling good.         Objective   See Exercise, Manual, and Modality Logs for complete treatment.     Assessment/Plan  Pt performing ex with good technique and no significant pain or discomfort.  Pt performed all therapeutic exercises with good technique. Pt continues to have limitations with strength and flexibility. Pt will benefit from continued PT to address these limitations.         Timed:  Therapeutic Exercise:    25     mins  44382;     Therapeutic Activity:     13     mins  84276;   Manual Therapy:    0     mins  78126;    Gait Trainin     mins  65621;     Neuromuscular Chang:   0    mins  41765;    Un-timed:  Mechanical Traction      0     mins  47826  Dry Needling     0     mins self-pay  Electrical Stimulation:    0     mins  34478 ( );      Timed Treatment:   38   mins   Total Treatment:     38   mins    Mik Everett, PT  Physical Therapist    Electronically signed 10/10/2023    [place] License: PT - [id number]

## 2023-10-12 ENCOUNTER — TREATMENT (OUTPATIENT)
Dept: PHYSICAL THERAPY | Facility: CLINIC | Age: 78
End: 2023-10-12
Payer: OTHER GOVERNMENT

## 2023-10-12 DIAGNOSIS — M25.551 BILATERAL HIP PAIN: Primary | ICD-10-CM

## 2023-10-12 DIAGNOSIS — R26.9 GAIT DISTURBANCE: ICD-10-CM

## 2023-10-12 DIAGNOSIS — M25.552 BILATERAL HIP PAIN: Primary | ICD-10-CM

## 2023-10-12 DIAGNOSIS — R53.1 LACK OF STRENGTH: ICD-10-CM

## 2023-10-12 DIAGNOSIS — M25.651 HIP JOINT STIFFNESS, BILATERAL: ICD-10-CM

## 2023-10-12 DIAGNOSIS — M25.652 HIP JOINT STIFFNESS, BILATERAL: ICD-10-CM

## 2023-10-12 NOTE — PROGRESS NOTES
Physical Therapy Daily Treatment Note                        [facility]  [address] [place] [address]    Patient: Jermaine Denis   : 1945  Diagnosis/ICD-10 Code:  Bilateral hip pain [M25.551, M25.552]  Referring practitioner: Indio ALDANA MD  Date of Initial Visit: Type: THERAPY  Noted: 2023  Today's Date: 10/12/2023  Patient seen for 6 sessions           Subjective Evaluation    History of Present Illness    Subjective comment: Pt was hurting more yesterday but better today.         Objective   See Exercise, Manual, and Modality Logs for complete treatment.     Assessment/Plan     Pt performed all therapeutic exercises with good technique. No increased pain with all exercises today. Pt continues to have limitations with strength and flexibility. Pt will benefit from continued PT to address these limitations.      Timed:  Therapeutic Exercise:     28    mins  89143;     Therapeutic Activity:     10     mins  75839;   Manual Therapy:    0     mins  19576;    Gait Trainin     mins  26818;     Neuromuscular Chang:   0    mins  59141;    Un-timed:  Mechanical Traction      0     mins  35237  Dry Needling     0     mins self-pay  Electrical Stimulation:    0     mins  56717 ( );      Timed Treatment:   38   mins   Total Treatment:     38   mins    Mik Everett PT  Physical Therapist    Electronically signed 10/12/2023    [place] License: PT - [id number]

## 2023-10-17 ENCOUNTER — TREATMENT (OUTPATIENT)
Dept: PHYSICAL THERAPY | Facility: CLINIC | Age: 78
End: 2023-10-17
Payer: OTHER GOVERNMENT

## 2023-10-17 DIAGNOSIS — R53.1 LACK OF STRENGTH: ICD-10-CM

## 2023-10-17 DIAGNOSIS — M25.651 HIP JOINT STIFFNESS, BILATERAL: ICD-10-CM

## 2023-10-17 DIAGNOSIS — M25.552 BILATERAL HIP PAIN: Primary | ICD-10-CM

## 2023-10-17 DIAGNOSIS — M25.551 BILATERAL HIP PAIN: Primary | ICD-10-CM

## 2023-10-17 DIAGNOSIS — M25.652 HIP JOINT STIFFNESS, BILATERAL: ICD-10-CM

## 2023-10-17 DIAGNOSIS — R26.9 GAIT DISTURBANCE: ICD-10-CM

## 2023-10-17 PROCEDURE — 97110 THERAPEUTIC EXERCISES: CPT

## 2023-10-17 NOTE — PROGRESS NOTES
Physical Therapy Daily Treatment Note                        [facility]  [address] [place] [address]    Patient: Jermaine Denis   : 1945  Diagnosis/ICD-10 Code:  Bilateral hip pain [M25.551, M25.552]  Referring practitioner: No ref. provider found  Date of Initial Visit: Type: THERAPY  Noted: 2023  Today's Date: 10/17/2023  Patient seen for 7 sessions           Subjective Evaluation    History of Present Illness    Subjective comment: Pt reports his lower right side of back is hurting more today since waking up. No change with hip.         Objective   See Exercise, Manual, and Modality Logs for complete treatment.     Assessment/Plan     Pt performed all therapeutic exercises with good technique. Pt continues to have limitations with strength and flexibility. Pt will benefit from continued PT to address these limitations.      Timed:  Therapeutic Exercise:    32     mins  74767;     Therapeutic Activity:     0     mins  82200;   Manual Therapy:    0     mins  15205;    Gait Trainin     mins  16151;     Neuromuscular Chang:   0    mins  51065;    Un-timed:  Mechanical Traction      0     mins  23536  Dry Needling     0     mins self-pay  Electrical Stimulation:    0     mins  96262 ( );      Timed Treatment:   32   mins   Total Treatment:     32   mins    Mik Everett, PT  Physical Therapist    Electronically signed 10/17/2023    [place] License: PT - [id number]

## 2023-10-19 ENCOUNTER — TREATMENT (OUTPATIENT)
Dept: PHYSICAL THERAPY | Facility: CLINIC | Age: 78
End: 2023-10-19
Payer: OTHER GOVERNMENT

## 2023-10-19 DIAGNOSIS — R26.9 GAIT DISTURBANCE: ICD-10-CM

## 2023-10-19 DIAGNOSIS — R53.1 LACK OF STRENGTH: ICD-10-CM

## 2023-10-19 DIAGNOSIS — M25.651 HIP JOINT STIFFNESS, BILATERAL: ICD-10-CM

## 2023-10-19 DIAGNOSIS — M25.551 BILATERAL HIP PAIN: Primary | ICD-10-CM

## 2023-10-19 DIAGNOSIS — M25.652 HIP JOINT STIFFNESS, BILATERAL: ICD-10-CM

## 2023-10-19 DIAGNOSIS — M25.552 BILATERAL HIP PAIN: Primary | ICD-10-CM

## 2023-10-19 NOTE — PROGRESS NOTES
Physical Therapy Daily Treatment Note                        [facility]  [address] [place] [address]    Patient: Jermaine Denis   : 1945  Diagnosis/ICD-10 Code:  Bilateral hip pain [M25.551, M25.552]  Referring practitioner: Indio ALDANA MD  Date of Initial Visit: Type: THERAPY  Noted: 2023  Today's Date: 10/19/2023  Patient seen for 8 sessions           Subjective   Pt doing well, no concerns.     Objective   See Exercise, Manual, and Modality Logs for complete treatment.     Assessment/Plan     Pt performed all therapeutic exercises with good technique. Pt continues to have limitations with strength and flexibility. Pt will benefit from continued PT to address these limitations.      Timed:  Therapeutic Exercise:    16     mins  83914;     Therapeutic Activity:     15     mins  10783;   Manual Therapy:    0     mins  74001;    Gait Trainin     mins  18251;     Neuromuscular Chang:   0    mins  30029;    Un-timed:  Mechanical Traction      0     mins  65873  Dry Needling     0     mins self-pay  Electrical Stimulation:    0     mins  43266 ( );      Timed Treatment:   31   mins   Total Treatment:     31   mins    Mik Everett PT  Physical Therapist    Electronically signed 10/19/2023    [place] License: PT - [id number]

## 2023-10-23 ENCOUNTER — TREATMENT (OUTPATIENT)
Dept: PHYSICAL THERAPY | Facility: CLINIC | Age: 78
End: 2023-10-23
Payer: OTHER GOVERNMENT

## 2023-10-23 DIAGNOSIS — M25.651 HIP JOINT STIFFNESS, BILATERAL: ICD-10-CM

## 2023-10-23 DIAGNOSIS — M25.652 HIP JOINT STIFFNESS, BILATERAL: ICD-10-CM

## 2023-10-23 DIAGNOSIS — R53.1 LACK OF STRENGTH: ICD-10-CM

## 2023-10-23 DIAGNOSIS — M25.552 BILATERAL HIP PAIN: Primary | ICD-10-CM

## 2023-10-23 DIAGNOSIS — R26.9 GAIT DISTURBANCE: ICD-10-CM

## 2023-10-23 DIAGNOSIS — M25.551 BILATERAL HIP PAIN: Primary | ICD-10-CM

## 2023-10-23 PROCEDURE — 97110 THERAPEUTIC EXERCISES: CPT

## 2023-10-23 PROCEDURE — 97530 THERAPEUTIC ACTIVITIES: CPT

## 2023-10-23 NOTE — PROGRESS NOTES
Physical Therapy Daily Treatment Note                        [facility]  [address] [place] [address]    Patient: Jermaine Denis   : 1945  Diagnosis/ICD-10 Code:  Bilateral hip pain [M25.551, M25.552]  Referring practitioner: Indio ALDANA MD  Date of Initial Visit: Type: THERAPY  Noted: 2023  Today's Date: 10/23/2023  Patient seen for 9 sessions           Subjective Evaluation    History of Present Illness    Subjective comment: Pt doing well. No new concerns.         Objective   See Exercise, Manual, and Modality Logs for complete treatment.     Assessment/Plan    Pt performed all therapeutic exercises with good technique. Pt continues to have limitations with strength and flexibility. Pt will benefit from continued PT to address these limitations.         Timed:  Therapeutic Exercise:    14    mins  49254;     Therapeutic Activity:     15     mins  26939;   Manual Therapy:    0     mins  31686;    Gait Trainin     mins  28819;     Neuromuscular Chang:   0    mins  03399;    Un-timed:  Mechanical Traction      0     mins  66047  Dry Needling     0     mins self-pay  Electrical Stimulation:    0     mins  25720 ( );      Timed Treatment:   29   mins   Total Treatment:     29   mins    Mik Everett, PT  Physical Therapist    Electronically signed 10/23/2023    [place] License: PT - [id number]

## 2023-10-25 ENCOUNTER — TREATMENT (OUTPATIENT)
Dept: PHYSICAL THERAPY | Facility: CLINIC | Age: 78
End: 2023-10-25
Payer: OTHER GOVERNMENT

## 2023-10-25 DIAGNOSIS — M25.652 HIP JOINT STIFFNESS, BILATERAL: ICD-10-CM

## 2023-10-25 DIAGNOSIS — R26.9 GAIT DISTURBANCE: ICD-10-CM

## 2023-10-25 DIAGNOSIS — M25.552 BILATERAL HIP PAIN: Primary | ICD-10-CM

## 2023-10-25 DIAGNOSIS — M25.651 HIP JOINT STIFFNESS, BILATERAL: ICD-10-CM

## 2023-10-25 DIAGNOSIS — M25.551 BILATERAL HIP PAIN: Primary | ICD-10-CM

## 2023-10-25 DIAGNOSIS — R53.1 LACK OF STRENGTH: ICD-10-CM

## 2023-10-25 NOTE — PROGRESS NOTES
Physical Therapy Daily Treatment Note                        [facility]  [address] [place] [address]    Patient: Jermaine Denis   : 1945  Diagnosis/ICD-10 Code:  Bilateral hip pain [M25.551, M25.552]  Referring practitioner: Indio ALDANA MD  Date of Initial Visit: Type: THERAPY  Noted: 2023  Today's Date: 10/25/2023  Patient seen for 10 sessions           Subjective Evaluation    History of Present Illness    Subjective comment: Pt doing well. Pt to see ortho surgeon tomorrow and likely set a date for joint replacement.         Objective          Strength/Myotome Testing     Right Hip   Planes of Motion   Flexion: 3+  Abduction: 4-  Adduction: 4-      See Exercise, Manual, and Modality Logs for complete treatment.     Assessment/Plan    Pt has been doing well with strengthening exercises. Ready to discharge to [facility]. Will likely have joint replacement in near future and return for rehab afterwards.        Timed:  Therapeutic Exercise:    16     mins  39058;     Therapeutic Activity:     15     mins  77606;   Manual Therapy:    0     mins  66249;    Gait Trainin     mins  04446;     Neuromuscular Chang:   0    mins  97563;    Un-timed:  Mechanical Traction      0     mins  77986  Dry Needling     0     mins self-pay  Electrical Stimulation:    0     mins  22438 ( );      Timed Treatment:   31   mins   Total Treatment:     31   mins    Mik Everett PT  Physical Therapist    Electronically signed 10/25/2023    [place] License: PT - [id number]

## 2023-10-26 ENCOUNTER — OFFICE VISIT (OUTPATIENT)
Dept: ORTHOPEDIC SURGERY | Facility: CLINIC | Age: 78
End: 2023-10-26
Payer: OTHER GOVERNMENT

## 2023-10-26 VITALS — WEIGHT: 224.9 LBS | TEMPERATURE: 97.8 F | BODY MASS INDEX: 38.39 KG/M2 | HEIGHT: 64 IN

## 2023-10-26 DIAGNOSIS — M16.11 PRIMARY OSTEOARTHRITIS OF RIGHT HIP: Primary | ICD-10-CM

## 2023-10-26 PROCEDURE — 99214 OFFICE O/P EST MOD 30 MIN: CPT | Performed by: ORTHOPAEDIC SURGERY

## 2023-11-03 PROBLEM — M16.11 OSTEOARTHRITIS OF RIGHT HIP: Status: ACTIVE | Noted: 2023-11-03

## 2023-11-03 RX ORDER — MELOXICAM 7.5 MG/1
15 TABLET ORAL ONCE
OUTPATIENT
Start: 2023-11-03 | End: 2023-11-03

## 2023-11-03 RX ORDER — ACETAMINOPHEN 10 MG/ML
1000 INJECTION, SOLUTION INTRAVENOUS ONCE
OUTPATIENT
Start: 2023-11-03 | End: 2023-11-03

## 2023-11-03 RX ORDER — CHLORHEXIDINE GLUCONATE 500 MG/1
CLOTH TOPICAL TAKE AS DIRECTED
OUTPATIENT
Start: 2023-11-03

## 2023-11-03 RX ORDER — PREGABALIN 150 MG/1
150 CAPSULE ORAL ONCE
OUTPATIENT
Start: 2023-11-03 | End: 2023-11-03

## 2023-11-03 NOTE — PROGRESS NOTES
Patient: Jermaine Denis  YOB: 1945 78 y.o. male  Medical Record Number: [id number]    Chief Complaint:   Chief Complaint   Patient presents with    Right Hip - Follow-up, Pain    Left Hip - Follow-up, Pain       History of Present Illness:Jermaine Denis is a 78 y.o. male who presents for follow-up of right hip pain.  Patient was seen previously diagnosed with right hip osteoarthritis.  Continue to try conservative treatments but continues to be quite symptomatic and has thought over some things would like to proceed with surgical intervention he is here today to discuss.      [facility].  He does see hematology due to anemia.  He does have diabetes but is unsure of his most recent hemoglobin A1c.    Allergies:   Allergies   Allergen Reactions    Sulfa Antibiotics Hives       Medications:   Current Outpatient Medications   Medication Sig Dispense Refill    acetaminophen (TYLENOL) 325 MG tablet Take 2 tablets by mouth Every 6 (Six) Hours As Needed for Mild Pain or Fever.      aspirin 81 MG EC tablet Take 1 tablet by mouth Every Other Day. HOLD PRIOR TO SURGERY PER MD      calcium citrate-vitamin d (CALCITRATE) 315-250 MG-UNIT tablet tablet Take 2 tablets by mouth 2 (Two) Times a Day. HOLD PRIOR TO SURG      cephalexin (KEFLEX) 500 MG capsule Take 1 capsule by mouth Every Morning.      cetirizine (zyrTEC) 10 MG tablet Take 1 tablet by mouth Daily As Needed for Allergies.      docusate sodium 100 MG capsule Take 100 mg by mouth 2 (Two) Times a Day.      ferrous sulfate 325 (65 FE) MG tablet Take 1 tablet by mouth 2 (Two) Times a Day.      finasteride (PROSCAR) 5 MG tablet Take 1 tablet by mouth Daily.      furosemide (LASIX) 20 MG tablet Take 1 tablet by mouth Every Morning.      gabapentin (NEURONTIN) 400 MG capsule Take 2 capsules by mouth 3 (Three) Times a Day.      glipizide (GLUCOTROL) 5 MG tablet Take 1 tablet by mouth 2 (Two) Times a Day Before Meals.      hydrocortisone 2.5 % cream Apply  "1 application  topically to the appropriate area as directed As Needed.      ketoconazole (NIZORAL) 2 % cream Apply 1 application  topically to the appropriate area as directed Daily.      ketoconazole (NIZORAL) 2 % shampoo Apply  topically to the appropriate area as directed 2 (Two) Times a Week.      methocarbamol (ROBAXIN) 500 MG tablet Take 1 tablet by mouth 4 (Four) Times a Day As Needed for Muscle Spasms. 40 tablet 1    Omega-3 Fatty Acids (FISH OIL) 1000 MG capsule capsule Take 1 capsule by mouth 3 (Three) Times a Day With Meals. HOLD PRIOR TO SURG      omeprazole (priLOSEC) 20 MG capsule Take 1 capsule by mouth Every Evening.      sennosides-docusate (PERICOLACE) 8.6-50 MG per tablet Take 1 tablet by mouth 2 (Two) Times a Day. 20 tablet 0    simvastatin (ZOCOR) 20 MG tablet Take 0.5 tablets by mouth Every Night.      vitamin B-12 (CYANOCOBALAMIN) 500 MCG tablet Take 1 tablet by mouth Every Other Day. HOLD PRIOR TO SURG       No current facility-administered medications for this visit.     Facility-Administered Medications Ordered in Other Visits   Medication Dose Route Frequency Provider Last Rate Last Admin    mupirocin (BACTROBAN) 2 % nasal ointment   Nasal BID Alex Ordaz MD             The following portions of the patient's history were reviewed and updated as appropriate: allergies, current medications, past family history, past medical history, past social history, past surgical history and problem list.    Review of Systems:   A 14 point review of systems was performed. All systems negative except pertinent positives/negative listed in HPI above    Physical Exam:   Vitals:    10/26/23 1051   Temp: 97.8 °F (36.6 °C)   TempSrc: Temporal   Weight: 102 kg (224 lb 14.4 oz)   Height: 162.6 cm (64.02\")   PainSc:   6   PainLoc: Hip       General: A and O x 3, ASA, NAD    SCLERA:    Normal    DENTITION:   Normal    Right lower extremity examined range of motion decreased due to pain discomfort.  " Positive Stinchfield.  Motor and sensory intact distally.  Patient does have a larger body habitus and large pannus that would likely preclude anterior approach given possible wound complications.      Radiology:   Previous films reviewed with the patient    Assessment/Plan:  Right hip osteoarthritis    I discussed the findings with the patient and went over arthritis in detail.  I discussed both conservative and surgical treatment options.  He has tried and failed multiple conservative treatments and continues to be quite symptomatic due to his hip which has been limiting his normal daily function and overall quality of life.  I discussed surgical intervention in detail with the patient including use of a model as well as risk and benefits.   I did discuss risk and benefits with the patient with risk including but not limited to bleeding, infection, damage to nearby nerves, vessels, tendons, ligaments, continued pain, worsening pain, fracture, dislocation, leg length discrepancy, blood clots, even death with anesthesia and possible need for future procedures surgeries.  Patient understood this and has chosen to proceed.      We will plan for right POSTERIOR total hip replacement with use of dual mobility given lumbar spinal pelvic fusion with instrumentation    Patient will be admitted for 23-hour observation.    We will need to get [facility] approval.    We will need hematology clearance and hemoglobin needs to be over 10.    We will check hemoglobin A1c which needs to be close to 7.    Once the above is obtained we will plan to proceed with right posterior total hip replacement.          Renny Iqbal MD  11/3/2023

## 2023-11-27 ENCOUNTER — PRE-ADMISSION TESTING (OUTPATIENT)
Dept: PREADMISSION TESTING | Facility: HOSPITAL | Age: 78
End: 2023-11-27
Payer: OTHER GOVERNMENT

## 2023-11-27 VITALS
DIASTOLIC BLOOD PRESSURE: 73 MMHG | OXYGEN SATURATION: 95 % | SYSTOLIC BLOOD PRESSURE: 160 MMHG | WEIGHT: 222.1 LBS | HEIGHT: 61 IN | HEART RATE: 73 BPM | TEMPERATURE: 97.7 F | RESPIRATION RATE: 18 BRPM | BODY MASS INDEX: 41.93 KG/M2

## 2023-11-27 LAB
ABO GROUP BLD: NORMAL
ANION GAP SERPL CALCULATED.3IONS-SCNC: 10.9 MMOL/L (ref 5–15)
BLD GP AB SCN SERPL QL: NEGATIVE
BUN SERPL-MCNC: 20 MG/DL (ref 8–23)
BUN/CREAT SERPL: 20.4 (ref 7–25)
CALCIUM SPEC-SCNC: 9.8 MG/DL (ref 8.6–10.5)
CHLORIDE SERPL-SCNC: 105 MMOL/L (ref 98–107)
CO2 SERPL-SCNC: 23.1 MMOL/L (ref 22–29)
CREAT SERPL-MCNC: 0.98 MG/DL (ref 0.76–1.27)
DEPRECATED RDW RBC AUTO: 45.3 FL (ref 37–54)
EGFRCR SERPLBLD CKD-EPI 2021: 79.4 ML/MIN/1.73
ERYTHROCYTE [DISTWIDTH] IN BLOOD BY AUTOMATED COUNT: 14.4 % (ref 12.3–15.4)
GLUCOSE SERPL-MCNC: 153 MG/DL (ref 65–99)
HBA1C MFR BLD: 7.2 % (ref 4.8–5.6)
HCT VFR BLD AUTO: 32.6 % (ref 37.5–51)
HGB BLD-MCNC: 9.9 G/DL (ref 13–17.7)
MCH RBC QN AUTO: 26.7 PG (ref 26.6–33)
MCHC RBC AUTO-ENTMCNC: 30.4 G/DL (ref 31.5–35.7)
MCV RBC AUTO: 87.9 FL (ref 79–97)
PLATELET # BLD AUTO: 250 10*3/MM3 (ref 140–450)
PMV BLD AUTO: 10.6 FL (ref 6–12)
POTASSIUM SERPL-SCNC: 4.4 MMOL/L (ref 3.5–5.2)
QT INTERVAL: 385 MS
QTC INTERVAL: 401 MS
RBC # BLD AUTO: 3.71 10*6/MM3 (ref 4.14–5.8)
RH BLD: POSITIVE
SODIUM SERPL-SCNC: 139 MMOL/L (ref 136–145)
T&S EXPIRATION DATE: NORMAL
WBC NRBC COR # BLD AUTO: 4.3 10*3/MM3 (ref 3.4–10.8)

## 2023-11-27 PROCEDURE — 86850 RBC ANTIBODY SCREEN: CPT | Performed by: ORTHOPAEDIC SURGERY

## 2023-11-27 PROCEDURE — 93005 ELECTROCARDIOGRAM TRACING: CPT

## 2023-11-27 PROCEDURE — 85027 COMPLETE CBC AUTOMATED: CPT

## 2023-11-27 PROCEDURE — 86900 BLOOD TYPING SEROLOGIC ABO: CPT | Performed by: ORTHOPAEDIC SURGERY

## 2023-11-27 PROCEDURE — 83036 HEMOGLOBIN GLYCOSYLATED A1C: CPT | Performed by: ORTHOPAEDIC SURGERY

## 2023-11-27 PROCEDURE — 86901 BLOOD TYPING SEROLOGIC RH(D): CPT | Performed by: ORTHOPAEDIC SURGERY

## 2023-11-27 PROCEDURE — 80048 BASIC METABOLIC PNL TOTAL CA: CPT

## 2023-11-27 PROCEDURE — 36415 COLL VENOUS BLD VENIPUNCTURE: CPT

## 2023-11-27 RX ORDER — HYDROCODONE BITARTRATE AND ACETAMINOPHEN 5; 325 MG/1; MG/1
1 TABLET ORAL NIGHTLY PRN
COMMUNITY

## 2023-11-27 RX ORDER — TAMSULOSIN HYDROCHLORIDE 0.4 MG/1
1 CAPSULE ORAL NIGHTLY
COMMUNITY

## 2023-11-27 RX ORDER — PHENOL 1.4 %
10-20 AEROSOL, SPRAY (ML) MUCOUS MEMBRANE NIGHTLY PRN
COMMUNITY

## 2023-11-27 RX ORDER — LISINOPRIL 20 MG/1
20 TABLET ORAL DAILY
COMMUNITY
End: 2023-11-27

## 2023-11-27 RX ORDER — NAPROXEN SODIUM 220 MG
220 TABLET ORAL 2 TIMES DAILY PRN
COMMUNITY

## 2023-11-27 RX ORDER — TRAZODONE HYDROCHLORIDE 50 MG/1
50 TABLET ORAL NIGHTLY PRN
COMMUNITY

## 2023-11-27 NOTE — DISCHARGE INSTRUCTIONS
Take the following medications the morning of surgery:  GABAPENTIN, OMEPRAZOLE      THE HOSPITAL WILL CALL YOU 1-2 DAYS PRIOR TO SURGERY WITH YOUR ARRIVAL TIME.      If you are on prescription narcotic pain medication to control your pain you may also take that medication the morning of surgery.    General Instructions:  Do not eat solid food after midnight the night before surgery.  You may drink clear liquids day of surgery but must stop at least one hour before your hospital arrival time.  It is beneficial for you to have a clear drink that contains carbohydrates the day of surgery.  We suggest a 12 to 20 ounce bottle of Gatorade or Powerade for non-diabetic patients or a 12 to 20 ounce bottle of G2 or Powerade Zero for diabetic patients. (Pediatric patients, are not advised to drink a 12 to 20 ounce carbohydrate drink)    Clear liquids are liquids you can see through.  Nothing red in color.     Plain water                               Sports drinks  Sodas                                   Gelatin (Jell-O)  Fruit juices without pulp such as white grape juice and apple juice  Popsicles that contain no fruit or yogurt  Tea or coffee (no cream or milk added)  Gatorade / Powerade  G2 / Powerade Zero    Infants may have breast milk up to four hours before surgery.  Infants drinking formula may drink formula up to six hours before surgery.   Patients who avoid smoking, chewing tobacco and alcohol for 4 weeks prior to surgery have a reduced risk of post-operative complications.  Quit smoking as many days before surgery as you can.  Do not smoke, use chewing tobacco or drink alcohol the day of surgery.   If applicable bring your C-PAP/ BI-PAP machine in with you to preop day of surgery.  Bring any papers given to you in the doctor’s office.  Wear clean comfortable clothes.  Do not wear contact lenses, false eyelashes or make-up.  Bring a case for your glasses.   Bring crutches or walker if applicable.  Remove all  piercings.  Leave jewelry and any other valuables at home.  Hair extensions with metal clips must be removed prior to surgery.  The Pre-Admission Testing nurse will instruct you to bring medications if unable to obtain an accurate list in Pre-Admission Testing.        If you were given a blood bank ID arm band remember to bring it with you the day of surgery.    Preventing a Surgical Site Infection:  For 2 to 3 days before surgery, avoid shaving with a razor because the razor can irritate skin and make it easier to develop an infection.    Any areas of open skin can increase the risk of a post-operative wound infection by allowing bacteria to enter and travel throughout the body.  Notify your surgeon if you have any skin wounds / rashes even if it is not near the expected surgical site.  The area will need assessed to determine if surgery should be delayed until it is healed.  The night prior to surgery shower using a fresh bar of anti-bacterial soap (such as Dial) and clean washcloth.  Sleep in a clean bed with clean clothing.  Do not allow pets to sleep with you.  Shower on the morning of surgery using a fresh bar of anti-bacterial soap (such as Dial) and clean washcloth.  Dry with a clean towel and dress in clean clothing.  Ask your surgeon if you will be receiving antibiotics prior to surgery.  Make sure you, your family, and all healthcare providers clean their hands with soap and water or an alcohol based hand  before caring for you or your wound.    Day of surgery:  Your arrival time is approximately two hours before your scheduled surgery time.  Upon arrival, a Pre-op nurse and Anesthesiologist will review your health history, obtain vital signs, and answer questions you may have.  The only belongings needed at this time will be a list of your home medications and if applicable your C-PAP/BI-PAP machine.  A Pre-op nurse will start an IV and you may receive medication in preparation for surgery,  including something to help you relax.     Please be aware that surgery does come with discomfort.  We want to make every effort to control your discomfort so please discuss any uncontrolled symptoms with your nurse.   Your doctor will most likely have prescribed pain medications.      If you are going home after surgery you will receive individualized written care instructions before being discharged.  A responsible adult must drive you to and from the hospital on the day of your surgery and stay with you for 24 hours.  Discharge prescriptions can be filled by the hospital pharmacy during regular pharmacy hours.  If you are having surgery late in the day/evening your prescription may be e-prescribed to your pharmacy.  Please verify your pharmacy hours or chose a 24 hour pharmacy to avoid not having access to your prescription because your pharmacy has closed for the day.    If you are staying overnight following surgery, you will be transported to your hospital room following the recovery period.  [facility] has all private rooms.    If you have any questions please call Pre-Admission Testing at [phone].  Deductibles and co-payments are collected on the day of service. Please be prepared to pay the required co-pay, deductible or deposit on the day of service as defined by your plan.    Call your surgeon immediately if you experience any of the following symptoms:  Sore Throat  Shortness of Breath or difficulty breathing  Cough  Chills  Body soreness or muscle pain  Headache  Fever  New loss of taste or smell  Do not arrive for your surgery ill.  Your procedure will need to be rescheduled to another time.  You will need to call your physician before the day of surgery to avoid any unnecessary exposure to hospital staff as well as other patients.    CHLORHEXIDINE CLOTH INSTRUCTIONS  The morning of surgery follow these instructions using the Chlorhexidine cloths you've been given.  These steps  reduce bacteria on the body.  Do not use the cloths near your eyes, ears mouth, genitalia or on open wounds.  Throw the cloths away after use but do not try to flush them down a toilet.      Open and remove one cloth at a time from the package.    Leave the cloth unfolded and begin the bathing.  Massage the skin with the cloths using gentle pressure to remove bacteria.  Do not scrub harshly.   Follow the steps below with one 2% CHG cloth per area (6 total cloths).  One cloth for neck, shoulders and chest.  One cloth for both arms, hands, fingers and underarms (do underarms last).  One cloth for the abdomen followed by groin.  One cloth for right leg and foot including between the toes.  One cloth for left leg and foot including between the toes.  The last cloth is to be used for the back of the neck, back and buttocks.    Allow the CHG to air dry 3 minutes on the skin which will give it time to work and decrease the chance of irritation.  The skin may feel sticky until it is dry.  Do not rinse with water or any other liquid or you will lose the beneficial effects of the CHG.  If mild skin irritation occurs, do rinse the skin to remove the CHG.  Report this to the nurse at time of admission.  Do not apply lotions, creams, ointments, deodorants or perfumes after using the clothes. Dress in clean clothes before coming to the hospital.

## 2023-11-30 ENCOUNTER — PREP FOR SURGERY (OUTPATIENT)
Dept: OTHER | Facility: HOSPITAL | Age: 78
End: 2023-11-30
Payer: OTHER GOVERNMENT

## 2023-11-30 ENCOUNTER — TELEPHONE (OUTPATIENT)
Dept: ORTHOPEDIC SURGERY | Facility: CLINIC | Age: 78
End: 2023-11-30
Payer: OTHER GOVERNMENT

## 2023-11-30 NOTE — TELEPHONE ENCOUNTER
----- Message from Renny Iqbal MD sent at 11/30/2023  3:14 PM EST -----  Quick question is the cutoff for BMI for total hips and of the knees are 40.  Also hemoglobin needs to be 10 or above is 9.9 and has been quite low over 9 months.  ----- Message -----  From: Lab, Background User  Sent: 11/27/2023   2:03 PM EST  To: Renny Iqbal MD

## 2023-11-30 NOTE — TELEPHONE ENCOUNTER
In review of his preadmission testing his hemoglobin is low at 9.9.  Patient has a history of chronic anemia also has a history of previous bypass.  He has been seen and worked up by hematology.  I have discussed with the patient that with his hip replacement surgery he more than likely will require transfusion postoperatively.  Patient remains on his vitamin B12 states that he had been on iron in the past however it did cause severe constipation instructed to DC the iron.  Patient is also on a multivitamin with iron.  Have advised the patient to eat foods high in iron between now and the time of surgery.  We will plan to check an H&H the morning of surgery and as long as his hemoglobin has not dropped and then will plan to proceed with surgery as scheduled

## 2023-12-07 ENCOUNTER — OFFICE VISIT (OUTPATIENT)
Dept: ORTHOPEDIC SURGERY | Facility: CLINIC | Age: 78
End: 2023-12-07
Payer: OTHER GOVERNMENT

## 2023-12-07 VITALS — TEMPERATURE: 98.7 F | BODY MASS INDEX: 42.25 KG/M2 | WEIGHT: 223.8 LBS | HEIGHT: 61 IN

## 2023-12-07 DIAGNOSIS — M16.11 PRIMARY OSTEOARTHRITIS OF RIGHT HIP: Primary | ICD-10-CM

## 2023-12-07 RX ORDER — OXYCODONE HYDROCHLORIDE AND ACETAMINOPHEN 5; 325 MG/1; MG/1
1 TABLET ORAL EVERY 6 HOURS PRN
Status: ON HOLD | COMMUNITY
End: 2023-12-13

## 2023-12-12 ENCOUNTER — TELEPHONE (OUTPATIENT)
Dept: ORTHOPEDIC SURGERY | Facility: CLINIC | Age: 78
End: 2023-12-12

## 2023-12-12 NOTE — TELEPHONE ENCOUNTER
Provider: YANCY    Caller: PATIENT    Phone Number: [phone]    Reason for Call: PATIENT STATES THAT [facility]FERR[facility] RUNS OUT ON 03/26/24, AND HE WANTS TO MAKE SURE OFFICE IS AWARE. PLEASE CALL HIM WITH ANY QUESTIONS.

## 2023-12-12 NOTE — PROGRESS NOTES
Patient: Jermaine Denis    Date of Admission: 12/13/23    YOB: 1945    Medical Record Number: [id number]    Admitting Physician: Dr. Renny Iqbal    Reason for Admission: End Stage Right Hip OA    History of Present Illness: 78 y.o. male presents with severe end stage hip osteoarthritis which has not been responsive to the full compliment of conservative measures. Despite conservative attempts, there is still severe, constant activity limiting hip pain. Given the severity of the pain, the patient has elected to proceed with hip replacement.  Patient has chronic anemia.  [facility]    Allergies:   Allergies   Allergen Reactions    Sulfa Antibiotics Hives         Current Medications:  Home Medications:    Current Outpatient Medications on File Prior to Visit   Medication Sig    acetaminophen (TYLENOL) 325 MG tablet Take 2 tablets by mouth Every 6 (Six) Hours As Needed for Mild Pain or Fever.    calcium citrate-vitamin d (CALCITRATE) 315-250 MG-UNIT tablet tablet Take 2 tablets by mouth 2 (Two) Times a Day.    cephalexin (KEFLEX) 500 MG capsule Take 1 capsule by mouth Every Morning.    cetirizine (zyrTEC) 10 MG tablet Take 1 tablet by mouth Daily As Needed for Allergies.    furosemide (LASIX) 20 MG tablet Take 1 tablet by mouth Every Morning.    gabapentin (NEURONTIN) 400 MG capsule Take 2 capsules by mouth 2 (Two) Times a Day.    glipizide (GLUCOTROL) 5 MG tablet Take 1 tablet by mouth Daily.    hydrocortisone 2.5 % cream Apply 1 application  topically to the appropriate area as directed As Needed.    ketoconazole (NIZORAL) 2 % cream Apply 1 application  topically to the appropriate area as directed As Needed.    ketoconazole (NIZORAL) 2 % shampoo Apply 1 application  topically to the appropriate area as directed As Needed.    Melatonin 10 MG tablet Take 1-2 tablets by mouth At Night As Needed.    Omega-3 Fatty Acids (FISH OIL) 1000 MG capsule capsule Take 1  capsule by mouth 3 (Three) Times a Day With Meals. HOLD PRIOR TO SURG    omeprazole (priLOSEC) 20 MG capsule Take 1 capsule by mouth 2 (Two) Times a Day.    oxyCODONE-acetaminophen (PERCOCET) 5-325 MG per tablet Take 1 tablet by mouth Every 6 (Six) Hours As Needed for Moderate Pain.    Probiotic Product (PROBIOTIC DAILY PO) Take 1 tablet by mouth Daily.    simvastatin (ZOCOR) 20 MG tablet Take 0.5 tablets by mouth Every Night.    traZODone (DESYREL) 50 MG tablet Take 1 tablet by mouth At Night As Needed for Sleep.    vitamin B-12 (CYANOCOBALAMIN) 500 MCG tablet Take 1 tablet by mouth Daily.    aspirin 81 MG EC tablet Take 1 tablet by mouth Every Other Day. HOLD PRIOR TO SURGERY PER MD (Patient not taking: Reported on 12/7/2023)    ferrous sulfate 325 (65 FE) MG tablet Take 1 tablet by mouth 2 (Two) Times a Day. (Patient not taking: Reported on 12/7/2023)    finasteride (PROSCAR) 5 MG tablet Take 1 tablet by mouth Daily. (Patient not taking: Reported on 12/7/2023)    HYDROcodone-acetaminophen (NORCO) 5-325 MG per tablet Take 1 tablet by mouth At Night As Needed. (Patient not taking: Reported on 12/7/2023)    methocarbamol (ROBAXIN) 500 MG tablet Take 1 tablet by mouth 4 (Four) Times a Day As Needed for Muscle Spasms. (Patient not taking: Reported on 12/7/2023)    naproxen sodium (ALEVE) 220 MG tablet Take 1 tablet by mouth 2 (Two) Times a Day As Needed. PT HOLDING FOR SURGERY (Patient not taking: Reported on 12/7/2023)    tamsulosin (FLOMAX) 0.4 MG capsule 24 hr capsule Take 1 capsule by mouth Every Night. (Patient not taking: Reported on 12/7/2023)     Current Facility-Administered Medications on File Prior to Visit   Medication    mupirocin (BACTROBAN) 2 % nasal ointment     PRN Meds:.    PMH:  Past Medical History:   Diagnosis Date    Anemia     CHRONIC    Arthritis     BPH (benign prostatic hyperplasia)     Diabetes mellitus     GERD (gastroesophageal reflux disease)     H/O gastric bypass     High cholesterol      History of bladder cancer     3 YR AGO    History of COVID-19     PT CAN NOT REMEMBER WHEN    History of hepatitis C     treated medically    History of infection     AFTER KNEE SURGERY ON ANTIBIOTICS DAILY    History of kidney stones     History of renal cell cancer     HAD TUMOR REMOVED    History of transfusion     Hypertension     Low back pain     Neck pain     Nerve damage     RIGHT HAND    Neuropathy     FEET    Psoriasis     Right hip pain     Right leg pain     Sleep apnea     NOT CURRENTLY USING MACHINE    Urethral stricture     caths self prn    UTI (urinary tract infection)     freq uti        PSURGH:  Past Surgical History:   Procedure Laterality Date    ANKLE SURGERY Right     orif with hardware    CARPAL TUNNEL RELEASE Left     CATARACT EXTRACTION WITH INTRAOCULAR LENS IMPLANT Bilateral     COLONOSCOPY      COLONOSCOPY N/A 05/12/2022    Procedure: COLONOSCOPY FOR SCREENING;  Surgeon: Mik Joaquin MD;  Location: [facility] ENDOSCOPY;  Service: Gastroenterology;  Laterality: N/A;  DIVERTICULOSIS, hemorrhoids    CYSTOSCOPY N/A 02/27/2023    Procedure: CYSTOSCOPY FLEXIBLE, insertion of jean baptiste catheter with urethral dilation;  Surgeon: Jermain Duran MD;  Location: [facility];  Service: Urology;  Laterality: N/A;    ENDOSCOPY      GASTRIC BYPASS      2008    HAND SURGERY Right     thumb     KNEE ARTHROPLASTY Left     LUMBAR FUSION N/A 02/27/2023    Procedure: LUMBAR THREE TO FOUR, FOUR TO FIVE TRANSFORAMINAL LUMBAR INTERBODY FUSION WITH LUMBAR THREE TO FOUR, FOUR TO FIVE, FIVE TO SACRAL ONE, SACRAL ONE TO TWO INSTRUMENTATION WITH ROBOTIC ASSISTANCE;  Surgeon: Indio Blas MD;  Location: [facility];  Service: Robotics - Neuro;  Laterality: N/A;    NEPHRECTOMY PARTIAL      ORIF WRIST FRACTURE Left     TOTAL SHOULDER ARTHROPLASTY W/ DISTAL CLAVICLE EXCISION Right 01/18/2018    Procedure: Reverse Total Shoulder Arthroplasty;  Surgeon: Alex Ordaz MD;  Location: [facility] OR;   "Service:     TOTAL SHOULDER ARTHROPLASTY W/ DISTAL CLAVICLE EXCISION Left 2018    Procedure: TOTAL SHOULDER REVERSE ARTHROPLASTY;  Surgeon: Alex Ordaz MD;  Location: [facility] OR;  Service: Orthopedics       SocialHx:  Social History     Occupational History    Not on file   Tobacco Use    Smoking status: Former     Packs/day: 2.00     Years: 15.00     Additional pack years: 0.00     Total pack years: 30.00     Types: Cigarettes     Quit date:      Years since quittin.9    Smokeless tobacco: Never   Vaping Use    Vaping Use: Never used   Substance and Sexual Activity    Alcohol use: Yes     Comment: bourbon/beer on occasion    Drug use: No    Sexual activity: Defer      Social History     Social History Narrative    Not on file       FamHx:  Family History   Problem Relation Age of Onset    Malig Hyperthermia Neg Hx     Colon cancer Neg Hx          Review of Systems:   A 14 point review of systems was performed, pertinent positives discussed above, all other systems are negative    Physical Exam: 78 y.o. male  Vital Signs :   Vitals:    23 1051   Temp: 98.7 °F (37.1 °C)   TempSrc: Temporal   Weight: 102 kg (223 lb 12.8 oz)   Height: 154.9 cm (60.98\")   PainSc:   8   PainLoc: Hip     General: Alert and Oriented x 3, No acute distress.  Psych: mood and affect appropriate; recent and remote memory intact  Eyes: conjunctiva clear; pupils equally round and reactive, sclera nonicteric  CV: no peripheral edema  Resp: normal respiratory effort  Skin: no rashes or wounds; normal turgor  Musculosketetal; pain with hip range of motion. Positive stinchfeld test. No trochanteric  Tenderness.  Vascular: palpable distal pulses    Labs:    Pre-Admission Testing on 2023   Component Date Value Ref Range Status    Glucose 2023 153 (H)  65 - 99 mg/dL Final    BUN 2023 20  8 - 23 mg/dL Final    Creatinine 2023 0.98  0.76 - 1.27 mg/dL Final    Sodium 2023 139  136 - 145 mmol/L " Final    Potassium 11/27/2023 4.4  3.5 - 5.2 mmol/L Final    Chloride 11/27/2023 105  98 - 107 mmol/L Final    CO2 11/27/2023 23.1  22.0 - 29.0 mmol/L Final    Calcium 11/27/2023 9.8  8.6 - 10.5 mg/dL Final    BUN/Creatinine Ratio 11/27/2023 20.4  7.0 - 25.0 Final    Anion Gap 11/27/2023 10.9  5.0 - 15.0 mmol/L Final    eGFR 11/27/2023 79.4  >60.0 mL/min/1.73 Final    WBC 11/27/2023 4.30  3.40 - 10.80 10*3/mm3 Final    RBC 11/27/2023 3.71 (L)  4.14 - 5.80 10*6/mm3 Final    Hemoglobin 11/27/2023 9.9 (L)  13.0 - 17.7 g/dL Final    Hematocrit 11/27/2023 32.6 (L)  37.5 - 51.0 % Final    MCV 11/27/2023 87.9  79.0 - 97.0 fL Final    MCH 11/27/2023 26.7  26.6 - 33.0 pg Final    MCHC 11/27/2023 30.4 (L)  31.5 - 35.7 g/dL Final    RDW 11/27/2023 14.4  12.3 - 15.4 % Final    RDW-SD 11/27/2023 45.3  37.0 - 54.0 fl Final    MPV 11/27/2023 10.6  6.0 - 12.0 fL Final    Platelets 11/27/2023 250  140 - 450 10*3/mm3 Final    QT Interval 11/27/2023 385  ms Final    QTC Interval 11/27/2023 401  ms Final     No results found.        Xrays:  Xrays AP pelvis and a lateral of the Right hip were reviewed demonstrating  End stage hip OA with bone on bone articulation, subchondral cysts and periarticular osteophytes.    Assessment:  End-stage Right hip osteoarthritis. Conservative measures have failed.      Plan:  The plan is to proceed with Right Total Hip Replacement. The patient voiced understanding of the risks, benefits, and alternative forms of treatment that were discussed with Dr Iqbal at the time of scheduling.  Patient is got his lab and EKG these were reviewed.  No changes in EKG.  He is done his joint class.  [facility]  He has been on iron for chronic anemia.  Hemoglobin levels do fluctuate from the upper eights to 11 has been the highest in the past year.  Most recent was 9.9.  The patient will prefer hemoglobin to be a minimum of 10 or above.  However right around this ranges his baseline.   Thus we will get an H&H morning of surgery if we feel it is low surgery may need to be canceled.  Otherwise possible plan for preoperative blood transfusion if needed.  Regardless given his chronic anemia he is at high risk for postoperative transfusion.  Patient is understanding of this.    Renny Iqbal MD  12/12/2023

## 2023-12-13 ENCOUNTER — ANESTHESIA EVENT (OUTPATIENT)
Dept: PERIOP | Facility: HOSPITAL | Age: 78
End: 2023-12-13
Payer: OTHER GOVERNMENT

## 2023-12-13 ENCOUNTER — HOSPITAL ENCOUNTER (OUTPATIENT)
Facility: HOSPITAL | Age: 78
Setting detail: OBSERVATION
Discharge: HOME | End: 2023-12-14
Attending: ORTHOPAEDIC SURGERY | Admitting: ORTHOPAEDIC SURGERY
Payer: OTHER GOVERNMENT

## 2023-12-13 ENCOUNTER — ANESTHESIA (OUTPATIENT)
Dept: PERIOP | Facility: HOSPITAL | Age: 78
End: 2023-12-13
Payer: OTHER GOVERNMENT

## 2023-12-13 ENCOUNTER — APPOINTMENT (OUTPATIENT)
Dept: GENERAL RADIOLOGY | Facility: HOSPITAL | Age: 78
End: 2023-12-13
Payer: OTHER GOVERNMENT

## 2023-12-13 DIAGNOSIS — M16.11 PRIMARY OSTEOARTHRITIS OF RIGHT HIP: ICD-10-CM

## 2023-12-13 LAB
GLUCOSE BLDC GLUCOMTR-MCNC: 208 MG/DL (ref 70–130)
GLUCOSE BLDC GLUCOMTR-MCNC: 242 MG/DL (ref 70–130)
GLUCOSE BLDC GLUCOMTR-MCNC: 321 MG/DL (ref 70–130)
GLUCOSE BLDC GLUCOMTR-MCNC: 343 MG/DL (ref 70–130)
HCT VFR BLD AUTO: 33.3 % (ref 37.5–51)
HGB BLD-MCNC: 10.2 G/DL (ref 13–17.7)

## 2023-12-13 PROCEDURE — C1776 JOINT DEVICE (IMPLANTABLE): HCPCS | Performed by: ORTHOPAEDIC SURGERY

## 2023-12-13 PROCEDURE — 25010000002 DEXAMETHASONE SODIUM PHOSPHATE 20 MG/5ML SOLUTION: Performed by: NURSE ANESTHETIST, CERTIFIED REGISTERED

## 2023-12-13 PROCEDURE — 25010000002 MAGNESIUM SULFATE PER 500 MG OF MAGNESIUM: Performed by: NURSE ANESTHETIST, CERTIFIED REGISTERED

## 2023-12-13 PROCEDURE — 25010000002 SUGAMMADEX 200 MG/2ML SOLUTION: Performed by: NURSE ANESTHETIST, CERTIFIED REGISTERED

## 2023-12-13 PROCEDURE — 25010000002 HYDROMORPHONE PER 4 MG: Performed by: NURSE ANESTHETIST, CERTIFIED REGISTERED

## 2023-12-13 PROCEDURE — 0 BUPIVACAINE LIPOSOME 1.3 % SUSPENSION 20 ML VIAL: Performed by: ORTHOPAEDIC SURGERY

## 2023-12-13 PROCEDURE — 25010000002 ACETAMINOPHEN 10 MG/ML SOLUTION: Performed by: NURSE ANESTHETIST, CERTIFIED REGISTERED

## 2023-12-13 PROCEDURE — 25810000003 SODIUM CHLORIDE 0.9 % SOLUTION: Performed by: ORTHOPAEDIC SURGERY

## 2023-12-13 PROCEDURE — 85014 HEMATOCRIT: CPT | Performed by: ORTHOPAEDIC SURGERY

## 2023-12-13 PROCEDURE — C1713 ANCHOR/SCREW BN/BN,TIS/BN: HCPCS | Performed by: ORTHOPAEDIC SURGERY

## 2023-12-13 PROCEDURE — 73501 X-RAY EXAM HIP UNI 1 VIEW: CPT

## 2023-12-13 PROCEDURE — 76000 FLUOROSCOPY <1 HR PHYS/QHP: CPT

## 2023-12-13 PROCEDURE — 25010000002 PROPOFOL 200 MG/20ML EMULSION: Performed by: NURSE ANESTHETIST, CERTIFIED REGISTERED

## 2023-12-13 PROCEDURE — 27130 TOTAL HIP ARTHROPLASTY: CPT | Performed by: SPECIALIST/TECHNOLOGIST, OTHER

## 2023-12-13 PROCEDURE — 25010000002 PROPOFOL 10 MG/ML EMULSION: Performed by: NURSE ANESTHETIST, CERTIFIED REGISTERED

## 2023-12-13 PROCEDURE — 25810000003 LACTATED RINGERS SOLUTION: Performed by: ORTHOPAEDIC SURGERY

## 2023-12-13 PROCEDURE — 25010000002 FENTANYL CITRATE (PF) 50 MCG/ML SOLUTION: Performed by: NURSE ANESTHETIST, CERTIFIED REGISTERED

## 2023-12-13 PROCEDURE — C9290 INJ, BUPIVACAINE LIPOSOME: HCPCS | Performed by: ORTHOPAEDIC SURGERY

## 2023-12-13 PROCEDURE — 25010000002 KETOROLAC TROMETHAMINE PER 15 MG: Performed by: ORTHOPAEDIC SURGERY

## 2023-12-13 PROCEDURE — 25010000002 CEFAZOLIN IN DEXTROSE 2-4 GM/100ML-% SOLUTION: Performed by: ORTHOPAEDIC SURGERY

## 2023-12-13 PROCEDURE — 85018 HEMOGLOBIN: CPT | Performed by: ORTHOPAEDIC SURGERY

## 2023-12-13 PROCEDURE — 82948 REAGENT STRIP/BLOOD GLUCOSE: CPT

## 2023-12-13 PROCEDURE — G0378 HOSPITAL OBSERVATION PER HR: HCPCS

## 2023-12-13 PROCEDURE — 25010000002 ONDANSETRON PER 1 MG: Performed by: NURSE ANESTHETIST, CERTIFIED REGISTERED

## 2023-12-13 PROCEDURE — S0260 H&P FOR SURGERY: HCPCS | Performed by: ORTHOPAEDIC SURGERY

## 2023-12-13 PROCEDURE — 25810000003 LACTATED RINGERS PER 1000 ML: Performed by: ORTHOPAEDIC SURGERY

## 2023-12-13 PROCEDURE — 27130 TOTAL HIP ARTHROPLASTY: CPT | Performed by: ORTHOPAEDIC SURGERY

## 2023-12-13 PROCEDURE — 25010000002 VANCOMYCIN 10 G RECONSTITUTED SOLUTION: Performed by: ORTHOPAEDIC SURGERY

## 2023-12-13 PROCEDURE — 25810000003 LACTATED RINGERS PER 1000 ML: Performed by: STUDENT IN AN ORGANIZED HEALTH CARE EDUCATION/TRAINING PROGRAM

## 2023-12-13 DEVICE — IMPLANTABLE DEVICE
Type: IMPLANTABLE DEVICE | Site: HIP | Status: FUNCTIONAL
Brand: VIVACIT-E®

## 2023-12-13 DEVICE — SCRW ACET CORT TRILOGY S/TAP 6.5X30: Type: IMPLANTABLE DEVICE | Site: HIP | Status: FUNCTIONAL

## 2023-12-13 DEVICE — STEM FEM/HIP AVENIR/COMPLETE STD/OFFST HA SZ4: Type: IMPLANTABLE DEVICE | Site: HIP | Status: FUNCTIONAL

## 2023-12-13 DEVICE — IMPLANTABLE DEVICE
Type: IMPLANTABLE DEVICE | Site: HIP | Status: FUNCTIONAL
Brand: G7® DUAL MOBILITY ACETABULAR SYSTEM

## 2023-12-13 DEVICE — CP HIP UPCHRG OSSEOTI LTD HL CUPS: Type: IMPLANTABLE DEVICE | Status: FUNCTIONAL

## 2023-12-13 DEVICE — BIOLOX® DELTA HEAD, 12/14, 28 X -3.5
Type: IMPLANTABLE DEVICE | Site: HIP | Status: FUNCTIONAL
Brand: BIOLOX® DELTA

## 2023-12-13 DEVICE — DEV CONTRL TISS STRATAFIX SYMM PDS PLUS VIL CT-1 60CM: Type: IMPLANTABLE DEVICE | Site: HIP | Status: FUNCTIONAL

## 2023-12-13 DEVICE — IMPLANTABLE DEVICE
Type: IMPLANTABLE DEVICE | Site: HIP | Status: FUNCTIONAL
Brand: G7® ACETABULAR SYSTEM

## 2023-12-13 DEVICE — TOTAL HIP PRIMARY: Type: IMPLANTABLE DEVICE | Status: FUNCTIONAL

## 2023-12-13 DEVICE — DEV CONTRL TISS STRATAFIXSPIRALMNCRYL PLSPS2 REV3/0 45CM: Type: IMPLANTABLE DEVICE | Site: HIP | Status: FUNCTIONAL

## 2023-12-13 DEVICE — CAP HIP 2 MOBL UPCHRG: Type: IMPLANTABLE DEVICE | Status: FUNCTIONAL

## 2023-12-13 RX ORDER — ACETAMINOPHEN 325 MG/1
325 TABLET ORAL EVERY 4 HOURS PRN
Status: DISCONTINUED | OUTPATIENT
Start: 2023-12-13 | End: 2023-12-14 | Stop reason: HOSPADM

## 2023-12-13 RX ORDER — HYDROCODONE BITARTRATE AND ACETAMINOPHEN 7.5; 325 MG/1; MG/1
1 TABLET ORAL EVERY 4 HOURS PRN
Status: DISCONTINUED | OUTPATIENT
Start: 2023-12-13 | End: 2023-12-14 | Stop reason: HOSPADM

## 2023-12-13 RX ORDER — SODIUM CHLORIDE 0.9 % (FLUSH) 0.9 %
3 SYRINGE (ML) INJECTION EVERY 12 HOURS SCHEDULED
Status: DISCONTINUED | OUTPATIENT
Start: 2023-12-13 | End: 2023-12-13 | Stop reason: HOSPADM

## 2023-12-13 RX ORDER — ASPIRIN 81 MG/1
81 TABLET ORAL EVERY 12 HOURS
Status: DISCONTINUED | OUTPATIENT
Start: 2023-12-14 | End: 2023-12-14 | Stop reason: HOSPADM

## 2023-12-13 RX ORDER — PROMETHAZINE HYDROCHLORIDE 25 MG/1
25 SUPPOSITORY RECTAL ONCE AS NEEDED
Status: DISCONTINUED | OUTPATIENT
Start: 2023-12-13 | End: 2023-12-13 | Stop reason: HOSPADM

## 2023-12-13 RX ORDER — HYDROCODONE BITARTRATE AND ACETAMINOPHEN 5; 325 MG/1; MG/1
1 TABLET ORAL ONCE AS NEEDED
Status: DISCONTINUED | OUTPATIENT
Start: 2023-12-13 | End: 2023-12-13 | Stop reason: HOSPADM

## 2023-12-13 RX ORDER — DROPERIDOL 2.5 MG/ML
0.62 INJECTION, SOLUTION INTRAMUSCULAR; INTRAVENOUS
Status: DISCONTINUED | OUTPATIENT
Start: 2023-12-13 | End: 2023-12-13 | Stop reason: HOSPADM

## 2023-12-13 RX ORDER — PREGABALIN 75 MG/1
150 CAPSULE ORAL ONCE
Status: COMPLETED | OUTPATIENT
Start: 2023-12-13 | End: 2023-12-13

## 2023-12-13 RX ORDER — CEFAZOLIN SODIUM 2 G/100ML
2000 INJECTION, SOLUTION INTRAVENOUS EVERY 8 HOURS
Qty: 200 ML | Refills: 0 | Status: COMPLETED | OUTPATIENT
Start: 2023-12-13 | End: 2023-12-14

## 2023-12-13 RX ORDER — DEXAMETHASONE SODIUM PHOSPHATE 4 MG/ML
INJECTION, SOLUTION INTRA-ARTICULAR; INTRALESIONAL; INTRAMUSCULAR; INTRAVENOUS; SOFT TISSUE AS NEEDED
Status: DISCONTINUED | OUTPATIENT
Start: 2023-12-13 | End: 2023-12-13 | Stop reason: SURG

## 2023-12-13 RX ORDER — MELOXICAM 15 MG/1
15 TABLET ORAL ONCE
Status: COMPLETED | OUTPATIENT
Start: 2023-12-13 | End: 2023-12-13

## 2023-12-13 RX ORDER — SODIUM CHLORIDE, SODIUM LACTATE, POTASSIUM CHLORIDE, CALCIUM CHLORIDE 600; 310; 30; 20 MG/100ML; MG/100ML; MG/100ML; MG/100ML
100 INJECTION, SOLUTION INTRAVENOUS CONTINUOUS
Status: DISCONTINUED | OUTPATIENT
Start: 2023-12-13 | End: 2023-12-14 | Stop reason: HOSPADM

## 2023-12-13 RX ORDER — ACETAMINOPHEN 10 MG/ML
INJECTION, SOLUTION INTRAVENOUS AS NEEDED
Status: DISCONTINUED | OUTPATIENT
Start: 2023-12-13 | End: 2023-12-13 | Stop reason: SURG

## 2023-12-13 RX ORDER — MELOXICAM 15 MG/1
15 TABLET ORAL DAILY
Status: DISCONTINUED | OUTPATIENT
Start: 2023-12-14 | End: 2023-12-14 | Stop reason: HOSPADM

## 2023-12-13 RX ORDER — ONDANSETRON 2 MG/ML
4 INJECTION INTRAMUSCULAR; INTRAVENOUS ONCE AS NEEDED
Status: DISCONTINUED | OUTPATIENT
Start: 2023-12-13 | End: 2023-12-13 | Stop reason: HOSPADM

## 2023-12-13 RX ORDER — SODIUM CHLORIDE, SODIUM LACTATE, POTASSIUM CHLORIDE, CALCIUM CHLORIDE 600; 310; 30; 20 MG/100ML; MG/100ML; MG/100ML; MG/100ML
9 INJECTION, SOLUTION INTRAVENOUS CONTINUOUS
Status: DISCONTINUED | OUTPATIENT
Start: 2023-12-13 | End: 2023-12-14 | Stop reason: HOSPADM

## 2023-12-13 RX ORDER — HYDROMORPHONE HYDROCHLORIDE 1 MG/ML
0.25 INJECTION, SOLUTION INTRAMUSCULAR; INTRAVENOUS; SUBCUTANEOUS
Status: DISCONTINUED | OUTPATIENT
Start: 2023-12-13 | End: 2023-12-13 | Stop reason: HOSPADM

## 2023-12-13 RX ORDER — HYDROCODONE BITARTRATE AND ACETAMINOPHEN 7.5; 325 MG/1; MG/1
1 TABLET ORAL EVERY 4 HOURS PRN
Status: DISCONTINUED | OUTPATIENT
Start: 2023-12-13 | End: 2023-12-13 | Stop reason: HOSPADM

## 2023-12-13 RX ORDER — MAGNESIUM HYDROXIDE 1200 MG/15ML
LIQUID ORAL AS NEEDED
Status: DISCONTINUED | OUTPATIENT
Start: 2023-12-13 | End: 2023-12-13 | Stop reason: HOSPADM

## 2023-12-13 RX ORDER — TRANEXAMIC ACID 100 MG/ML
INJECTION, SOLUTION INTRAVENOUS AS NEEDED
Status: DISCONTINUED | OUTPATIENT
Start: 2023-12-13 | End: 2023-12-13 | Stop reason: SURG

## 2023-12-13 RX ORDER — VANCOMYCIN/0.9 % SOD CHLORIDE 1.5G/250ML
15 PLASTIC BAG, INJECTION (ML) INTRAVENOUS ONCE
Status: COMPLETED | OUTPATIENT
Start: 2023-12-13 | End: 2023-12-13

## 2023-12-13 RX ORDER — CHLORHEXIDINE GLUCONATE 500 MG/1
CLOTH TOPICAL TAKE AS DIRECTED
Status: DISCONTINUED | OUTPATIENT
Start: 2023-12-13 | End: 2023-12-14 | Stop reason: HOSPADM

## 2023-12-13 RX ORDER — DIPHENHYDRAMINE HYDROCHLORIDE 50 MG/ML
12.5 INJECTION INTRAMUSCULAR; INTRAVENOUS
Status: DISCONTINUED | OUTPATIENT
Start: 2023-12-13 | End: 2023-12-13 | Stop reason: HOSPADM

## 2023-12-13 RX ORDER — DOCUSATE SODIUM 100 MG/1
100 CAPSULE, LIQUID FILLED ORAL 2 TIMES DAILY
Status: DISCONTINUED | OUTPATIENT
Start: 2023-12-13 | End: 2023-12-14 | Stop reason: HOSPADM

## 2023-12-13 RX ORDER — MAGNESIUM SULFATE HEPTAHYDRATE 500 MG/ML
INJECTION, SOLUTION INTRAMUSCULAR; INTRAVENOUS AS NEEDED
Status: DISCONTINUED | OUTPATIENT
Start: 2023-12-13 | End: 2023-12-13 | Stop reason: SURG

## 2023-12-13 RX ORDER — SODIUM CHLORIDE 0.9 % (FLUSH) 0.9 %
3-10 SYRINGE (ML) INJECTION AS NEEDED
Status: DISCONTINUED | OUTPATIENT
Start: 2023-12-13 | End: 2023-12-13 | Stop reason: HOSPADM

## 2023-12-13 RX ORDER — FENTANYL CITRATE 50 UG/ML
50 INJECTION, SOLUTION INTRAMUSCULAR; INTRAVENOUS ONCE AS NEEDED
Status: DISCONTINUED | OUTPATIENT
Start: 2023-12-13 | End: 2023-12-13 | Stop reason: HOSPADM

## 2023-12-13 RX ORDER — BISACODYL 10 MG
10 SUPPOSITORY, RECTAL RECTAL DAILY PRN
Status: DISCONTINUED | OUTPATIENT
Start: 2023-12-13 | End: 2023-12-14 | Stop reason: HOSPADM

## 2023-12-13 RX ORDER — ROCURONIUM BROMIDE 10 MG/ML
INJECTION, SOLUTION INTRAVENOUS AS NEEDED
Status: DISCONTINUED | OUTPATIENT
Start: 2023-12-13 | End: 2023-12-13 | Stop reason: SURG

## 2023-12-13 RX ORDER — HYDROCODONE BITARTRATE AND ACETAMINOPHEN 7.5; 325 MG/1; MG/1
2 TABLET ORAL EVERY 4 HOURS PRN
Status: DISCONTINUED | OUTPATIENT
Start: 2023-12-13 | End: 2023-12-14 | Stop reason: HOSPADM

## 2023-12-13 RX ORDER — KETOROLAC TROMETHAMINE 15 MG/ML
15 INJECTION, SOLUTION INTRAMUSCULAR; INTRAVENOUS EVERY 6 HOURS PRN
Status: DISCONTINUED | OUTPATIENT
Start: 2023-12-13 | End: 2023-12-14 | Stop reason: HOSPADM

## 2023-12-13 RX ORDER — LABETALOL HYDROCHLORIDE 5 MG/ML
5 INJECTION, SOLUTION INTRAVENOUS
Status: DISCONTINUED | OUTPATIENT
Start: 2023-12-13 | End: 2023-12-13 | Stop reason: HOSPADM

## 2023-12-13 RX ORDER — ONDANSETRON 4 MG/1
4 TABLET, FILM COATED ORAL EVERY 6 HOURS PRN
Status: DISCONTINUED | OUTPATIENT
Start: 2023-12-13 | End: 2023-12-14 | Stop reason: HOSPADM

## 2023-12-13 RX ORDER — FLUMAZENIL 0.1 MG/ML
0.2 INJECTION INTRAVENOUS AS NEEDED
Status: DISCONTINUED | OUTPATIENT
Start: 2023-12-13 | End: 2023-12-13 | Stop reason: HOSPADM

## 2023-12-13 RX ORDER — FENTANYL CITRATE 50 UG/ML
25 INJECTION, SOLUTION INTRAMUSCULAR; INTRAVENOUS
Status: DISCONTINUED | OUTPATIENT
Start: 2023-12-13 | End: 2023-12-13 | Stop reason: HOSPADM

## 2023-12-13 RX ORDER — IPRATROPIUM BROMIDE AND ALBUTEROL SULFATE 2.5; .5 MG/3ML; MG/3ML
3 SOLUTION RESPIRATORY (INHALATION) ONCE AS NEEDED
Status: DISCONTINUED | OUTPATIENT
Start: 2023-12-13 | End: 2023-12-13 | Stop reason: HOSPADM

## 2023-12-13 RX ORDER — ONDANSETRON 2 MG/ML
INJECTION INTRAMUSCULAR; INTRAVENOUS AS NEEDED
Status: DISCONTINUED | OUTPATIENT
Start: 2023-12-13 | End: 2023-12-13 | Stop reason: SURG

## 2023-12-13 RX ORDER — POLYETHYLENE GLYCOL 3350 17 G/17G
17 POWDER, FOR SOLUTION ORAL 2 TIMES DAILY
Status: DISCONTINUED | OUTPATIENT
Start: 2023-12-13 | End: 2023-12-14 | Stop reason: HOSPADM

## 2023-12-13 RX ORDER — EPHEDRINE SULFATE 50 MG/ML
5 INJECTION, SOLUTION INTRAVENOUS ONCE AS NEEDED
Status: DISCONTINUED | OUTPATIENT
Start: 2023-12-13 | End: 2023-12-13 | Stop reason: HOSPADM

## 2023-12-13 RX ORDER — ONDANSETRON 2 MG/ML
4 INJECTION INTRAMUSCULAR; INTRAVENOUS EVERY 6 HOURS PRN
Status: DISCONTINUED | OUTPATIENT
Start: 2023-12-13 | End: 2023-12-14 | Stop reason: HOSPADM

## 2023-12-13 RX ORDER — LIDOCAINE HYDROCHLORIDE 10 MG/ML
0.5 INJECTION, SOLUTION INFILTRATION; PERINEURAL ONCE AS NEEDED
Status: DISCONTINUED | OUTPATIENT
Start: 2023-12-13 | End: 2023-12-13 | Stop reason: HOSPADM

## 2023-12-13 RX ORDER — HYDRALAZINE HYDROCHLORIDE 20 MG/ML
5 INJECTION INTRAMUSCULAR; INTRAVENOUS
Status: DISCONTINUED | OUTPATIENT
Start: 2023-12-13 | End: 2023-12-13 | Stop reason: HOSPADM

## 2023-12-13 RX ORDER — PROMETHAZINE HYDROCHLORIDE 25 MG/1
25 TABLET ORAL ONCE AS NEEDED
Status: DISCONTINUED | OUTPATIENT
Start: 2023-12-13 | End: 2023-12-13 | Stop reason: HOSPADM

## 2023-12-13 RX ORDER — PROPOFOL 10 MG/ML
INJECTION, EMULSION INTRAVENOUS AS NEEDED
Status: DISCONTINUED | OUTPATIENT
Start: 2023-12-13 | End: 2023-12-13 | Stop reason: SURG

## 2023-12-13 RX ORDER — NALOXONE HCL 0.4 MG/ML
0.2 VIAL (ML) INJECTION AS NEEDED
Status: DISCONTINUED | OUTPATIENT
Start: 2023-12-13 | End: 2023-12-13 | Stop reason: HOSPADM

## 2023-12-13 RX ORDER — FENTANYL CITRATE 50 UG/ML
INJECTION, SOLUTION INTRAMUSCULAR; INTRAVENOUS AS NEEDED
Status: DISCONTINUED | OUTPATIENT
Start: 2023-12-13 | End: 2023-12-13 | Stop reason: SURG

## 2023-12-13 RX ORDER — LIDOCAINE HYDROCHLORIDE 20 MG/ML
INJECTION, SOLUTION EPIDURAL; INFILTRATION; INTRACAUDAL; PERINEURAL AS NEEDED
Status: DISCONTINUED | OUTPATIENT
Start: 2023-12-13 | End: 2023-12-13 | Stop reason: SURG

## 2023-12-13 RX ORDER — CEFAZOLIN SODIUM 2 G/100ML
2 INJECTION, SOLUTION INTRAVENOUS ONCE
Status: COMPLETED | OUTPATIENT
Start: 2023-12-13 | End: 2023-12-13

## 2023-12-13 RX ADMIN — TRANEXAMIC ACID 1000 MG: 100 INJECTION INTRAVENOUS at 14:54

## 2023-12-13 RX ADMIN — ROCURONIUM BROMIDE 30 MG: 10 INJECTION, SOLUTION INTRAVENOUS at 14:20

## 2023-12-13 RX ADMIN — MAGNESIUM SULFATE HEPTAHYDRATE 2 G: 500 INJECTION, SOLUTION INTRAMUSCULAR; INTRAVENOUS at 12:37

## 2023-12-13 RX ADMIN — SODIUM CHLORIDE 1500 MG: 9 INJECTION, SOLUTION INTRAVENOUS at 10:22

## 2023-12-13 RX ADMIN — ACETAMINOPHEN 1000 MG: 10 INJECTION, SOLUTION INTRAVENOUS at 12:37

## 2023-12-13 RX ADMIN — FENTANYL CITRATE 25 MCG: 50 INJECTION, SOLUTION INTRAMUSCULAR; INTRAVENOUS at 12:31

## 2023-12-13 RX ADMIN — DEXAMETHASONE SODIUM PHOSPHATE 8 MG: 4 INJECTION, SOLUTION INTRAMUSCULAR; INTRAVENOUS at 12:14

## 2023-12-13 RX ADMIN — FENTANYL CITRATE 25 MCG: 50 INJECTION, SOLUTION INTRAMUSCULAR; INTRAVENOUS at 15:42

## 2023-12-13 RX ADMIN — HYDROCODONE BITARTRATE AND ACETAMINOPHEN 1 TABLET: 7.5; 325 TABLET ORAL at 15:43

## 2023-12-13 RX ADMIN — KETOROLAC TROMETHAMINE 15 MG: 15 INJECTION, SOLUTION INTRAMUSCULAR; INTRAVENOUS at 18:14

## 2023-12-13 RX ADMIN — SODIUM CHLORIDE, POTASSIUM CHLORIDE, SODIUM LACTATE AND CALCIUM CHLORIDE 100 ML/HR: 600; 310; 30; 20 INJECTION, SOLUTION INTRAVENOUS at 18:14

## 2023-12-13 RX ADMIN — ONDANSETRON 4 MG: 2 INJECTION INTRAMUSCULAR; INTRAVENOUS at 15:06

## 2023-12-13 RX ADMIN — MELOXICAM 15 MG: 15 TABLET ORAL at 09:51

## 2023-12-13 RX ADMIN — LIDOCAINE HYDROCHLORIDE 60 MG: 20 INJECTION, SOLUTION EPIDURAL; INFILTRATION; INTRACAUDAL; PERINEURAL at 12:04

## 2023-12-13 RX ADMIN — FENTANYL CITRATE 25 MCG: 50 INJECTION, SOLUTION INTRAMUSCULAR; INTRAVENOUS at 15:37

## 2023-12-13 RX ADMIN — SODIUM CHLORIDE, POTASSIUM CHLORIDE, SODIUM LACTATE AND CALCIUM CHLORIDE: 600; 310; 30; 20 INJECTION, SOLUTION INTRAVENOUS at 11:57

## 2023-12-13 RX ADMIN — PROPOFOL 100 MG: 10 INJECTION, EMULSION INTRAVENOUS at 12:04

## 2023-12-13 RX ADMIN — TRANEXAMIC ACID 1000 MG: 100 INJECTION INTRAVENOUS at 12:20

## 2023-12-13 RX ADMIN — FENTANYL CITRATE 25 MCG: 50 INJECTION, SOLUTION INTRAMUSCULAR; INTRAVENOUS at 12:39

## 2023-12-13 RX ADMIN — ROCURONIUM BROMIDE 50 MG: 10 INJECTION, SOLUTION INTRAVENOUS at 12:04

## 2023-12-13 RX ADMIN — HYDROCODONE BITARTRATE AND ACETAMINOPHEN 2 TABLET: 7.5; 325 TABLET ORAL at 22:25

## 2023-12-13 RX ADMIN — SUGAMMADEX 210 MG: 100 INJECTION, SOLUTION INTRAVENOUS at 15:18

## 2023-12-13 RX ADMIN — SODIUM CHLORIDE, POTASSIUM CHLORIDE, SODIUM LACTATE AND CALCIUM CHLORIDE 500 ML: 600; 310; 30; 20 INJECTION, SOLUTION INTRAVENOUS at 10:06

## 2023-12-13 RX ADMIN — SODIUM CHLORIDE, POTASSIUM CHLORIDE, SODIUM LACTATE AND CALCIUM CHLORIDE: 600; 310; 30; 20 INJECTION, SOLUTION INTRAVENOUS at 13:56

## 2023-12-13 RX ADMIN — ROCURONIUM BROMIDE 50 MG: 10 INJECTION, SOLUTION INTRAVENOUS at 12:54

## 2023-12-13 RX ADMIN — PREGABALIN 150 MG: 75 CAPSULE ORAL at 09:51

## 2023-12-13 RX ADMIN — HYDROMORPHONE HYDROCHLORIDE 0.25 MG: 1 INJECTION, SOLUTION INTRAMUSCULAR; INTRAVENOUS; SUBCUTANEOUS at 16:03

## 2023-12-13 RX ADMIN — CEFAZOLIN SODIUM 2000 MG: 2 INJECTION, SOLUTION INTRAVENOUS at 22:17

## 2023-12-13 RX ADMIN — PROPOFOL 25 MCG/KG/MIN: 10 INJECTION, EMULSION INTRAVENOUS at 12:44

## 2023-12-13 RX ADMIN — HYDROMORPHONE HYDROCHLORIDE 0.25 MG: 1 INJECTION, SOLUTION INTRAMUSCULAR; INTRAVENOUS; SUBCUTANEOUS at 16:26

## 2023-12-13 RX ADMIN — DOCUSATE SODIUM 100 MG: 100 CAPSULE, LIQUID FILLED ORAL at 22:25

## 2023-12-13 RX ADMIN — CEFAZOLIN SODIUM 2 G: 2 INJECTION, SOLUTION INTRAVENOUS at 11:54

## 2023-12-13 NOTE — ANESTHESIA PREPROCEDURE EVALUATION
Anesthesia Evaluation     Patient summary reviewed and Nursing notes reviewed   no history of anesthetic complications:   NPO Solid Status: > 8 hours  NPO Liquid Status: > 2 hours           Airway   Mallampati: II  TM distance: >3 FB  Neck ROM: full  Dental      Pulmonary    (+) ,sleep apnea  Cardiovascular     (+) hypertension      Neuro/Psych  GI/Hepatic/Renal/Endo    (+) morbid obesity, GERD, diabetes mellitus    Musculoskeletal     Abdominal   (+) obese   Substance History      OB/GYN          Other   blood dyscrasia anemia,                   Anesthesia Plan    ASA 3     general   total IV anesthesia  intravenous induction     Anesthetic plan, risks, benefits, and alternatives have been provided, discussed and informed consent has been obtained with: patient.      CODE STATUS:

## 2023-12-13 NOTE — INTERVAL H&P NOTE
H&P reviewed.  The patient was examined and there are no changes to the H&P    H/H done and Hg 10    Plan to proceed. As discussed he is at higher risk for transfusion given chronic anemia.

## 2023-12-13 NOTE — OP NOTE
Name: Jermaine Denis  YOB: 1945    DATE OF SURGERY: 12/13/2023    PREOPERATIVE DIAGNOSIS: Right hip end-stage osteoarthritis    POSTOPERATIVE DIAGNOSIS: Right hip end-stage osteoarthritis    PROCEDURE PERFORMED: Right  total hip replacement    SURGEON: Renny Iqbal M.D.    ASSISTANT: KG SINCLAIR    A surgical assistant was integral in ensuring a successful outcome with this procedure.  The assistant was utilized to assist in positioning the patient, draping the patient, was used throughout the case to provide with retraction of tissues, suctioning of blood and body fluids for visualization, positioning of the extremity to allow for proper exposure so that I could perform the procedure.  Without the use of a surgical assistant during this procedure I feel that the outcome may have been compromised or would have been suboptimal or at risk for complications.    IMPLANTS:  Implant Name Type Inv. Item Serial No.  Lot No. LRB No. Used Action   DEV CONTRL TISS STRATAFIX SYMM PDS PLUS LUIS CT-1 60CM - [id number] Implant DEV CONTRL TISS STRATAFIX SYMM PDS PLUS LUIS CT-1 60CM  [employer]  [employer]  AND J [id number] Right 1 Implanted   DEV CONTRL TISS STRATAFIXSPIRALMNCRYL PLSPS2 REV3/0 45CM - [id number] Implant DEV CONTRL TISS STRATAFIXSPIRALMNCRYL PLSPS2 REV3/0 45CM  [employer]  [employer]  AND J [id number] Right 1 Implanted   SHLL ACET OSSEOTI G7 3H HENRY 52MM - [id number] Implant SHLL ACET OSSEOTI G7 3H HENRY 52MM  [employer] [id number] Right 1 Implanted   LINER G7 2MOBL HENRY 42MM - [id number] Implant LINER G7 2MOBL HENRY 42MM  [employer] [id number] Right 1 Implanted   SCRW ACET JOHN TRILOGY S/TAP 6.5X30 - [id number] Implant SCRW ACET JOHN TRILOGY S/TAP 6.5X30  [employer] [id number] Right 1 Implanted   STEM FEM/HIP AVENIR/COMPLETE STD/OFFST HA SZ4 - [id number] Implant STEM FEM/HIP AVENIR/COMPLETE STD/OFFST HA SZ4  [employer] [id number] Right 1 Implanted   BEAR HIP VIVACIT/E 2MOBIL HXPE 45V43MM - [id number]  Implant BEAR HIP VIVACIT/E 2MOBIL HXPE [id number]  [employer] [id number] Right 1 Implanted   HD FEM/HIP BIOLOX/DELTA CERAM 12/95M69DI MIN3.5MM - [id number] Implant HD FEM/HIP BIOLOX/DELTA CERAM 12/60D71HH MIN3.5MM  [employer] [id number] Right 1 Implanted       Estimated Blood Loss: 200 mL  Specimens : none  Complications: none    DESCRIPTION OF PROCEDURE:    The patient was taken to the operating room and placed in the supine position. A sequential compression device was carefully placed on the non-operative leg. Preoperative antibiotics were administered. Surgical time out was performed. After adequate induction of anesthesia the patient was then transferred onto the  table and positioned appropriately in the lateral decubitus position. The hip was then prepped and draped in the usual sterile fashion.    A posterior lateral surgical incision was then made.  The gluteus jeramie fascia was then divided.  The gluteus jeramie muscle was then bluntly dissected.  A Charnley self-retaining retractor was then placed.  A posterior capsulotomy was then performed.  The superior limb was divided in line with the piriformis tendon.  The hip was then dislocated.  There were end-stage arthritic findings.  The femoral neck osteotomy was performed according to the level dictated by the template.  The acetabulum was exposed with standard retractors.  The labrum and pulvinar were then excised.  The cup was then medialized with the starting acetabular reamer to the medial wall.  I then progressively reamed up to the appropriate size in 40°of abduction and 20° of anteversion. Line to line reaming was performed. At this point the bone was nicely prepared.  There was excellent bleeding bone. We then impacted the acetabular component in 45 of abduction and 20 of anteversion the cup was stable.  Per routine we augmented the fixation with 1 screws in the posterior and superior quadrant  The final liner was then placed and it locked in nicely.   At this point we injected the hip with anesthetic cocktail solution.  We then turned our attention to the femur.   Standard retractors were placed to expose the femur.  The box osteotome was used to create a starting point.  The canal finder was then used to sound the canal.  The lateralizing reamer was then used to lateralize into the greater trochanter.  We progressively reamed and broached until the broach was very solid to axial and rotational stresses.  At this point we placed the trial neck and head.  Hip was very stable to flexion and internal rotation as well as extension and external rotation.  The leg lengths were measured to be equal.  We then removed the trial components, copiously irrigated the hip, and then impacted the final stem.  At this point we then trialed and chose the final head. The head was placed on a clean dry taper.  The leg lengths were again measured to be equal.  At this point the hip was copiously irrigated with pulse lavage and the capsule was then reapproximated with #1 PDS suture, and the remainder of the hip was closed in multiple layers in standard fashion..  There was excellent hemostasis. We placed a one-eighth inch Hemovac drain.  Sterile dressings were applied. At the end of the case, the sponge and needle counts were reported as being correct. There were no known complications. The patient was then transported to the recovery room.      Renny Iqbal M.D.  12/13/2023

## 2023-12-13 NOTE — ANESTHESIA POSTPROCEDURE EVALUATION
Patient: Jermaine Denis    Procedure Summary       Date: 12/13/23 Room / Location: [facility]  /  [facility]    Anesthesia Start: 1157 Anesthesia Stop: 1526    Procedure: Right posterior total hip arthroplasty (Right: Hip) Diagnosis:       Primary osteoarthritis of right hip      (Primary osteoarthritis of right hip [M16.11])    Surgeons: Renny Iqbal MD Provider: Rico Jamison DO    Anesthesia Type: general ASA Status: 3            Anesthesia Type: general    Vitals  Vitals Value Taken Time   /60 12/13/23 1630   Temp 37 °C (98.6 °F) 12/13/23 1525   Pulse 67 12/13/23 1644   Resp 14 12/13/23 1525   SpO2 100 % 12/13/23 1644   Vitals shown include unfiled device data.        Post Anesthesia Care and Evaluation    Patient location during evaluation: bedside  Patient participation: complete - patient participated  Level of consciousness: awake  Pain management: adequate    Airway patency: patent  Anesthetic complications: No anesthetic complications  PONV Status: controlled  Cardiovascular status: acceptable  Respiratory status: acceptable  Hydration status: acceptable    Comments: --------------------            12/13/23               1615     --------------------   BP:       124/63     Pulse:               Resp:                Temp:                SpO2:               --------------------

## 2023-12-13 NOTE — ANESTHESIA PROCEDURE NOTES
Airway  Urgency: elective    Date/Time: 12/13/2023 12:08 PM  Airway not difficult    General Information and Staff    Patient location during procedure: OR  Anesthesiologist: Rico Jamison DO  CRNA/CAA: Aissatou Nagy CRNA    Indications and Patient Condition  Indications for airway management: airway protection    Preoxygenated: yes  MILS maintained throughout  Mask difficulty assessment: 2 - vent by mask + OA or adjuvant +/- NMBA    Final Airway Details  Final airway type: endotracheal airway      Successful airway: ETT  Cuffed: yes   Successful intubation technique: direct laryngoscopy  Endotracheal tube insertion site: oral  Blade: Childs  Blade size: 2  ETT size (mm): 7.5  Cormack-Lehane Classification: grade I - full view of glottis  Placement verified by: chest auscultation   Cuff volume (mL): 6  Measured from: lips  ETT/EBT  to lips (cm): 23  Number of attempts at approach: 1  Assessment: lips, teeth, and gum same as pre-op and atraumatic intubation    Additional Comments  Pt preoxygenated, SIVI, bag mask vent, ATETI

## 2023-12-13 NOTE — H&P
Patient: Jermaine Denis    Date of Admission: 12/13/23    YOB: 1945    Medical Record Number: [id number]    Admitting Physician: Dr. Renny Iqbal    Reason for Admission: End Stage Right Hip OA    History of Present Illness: 78 y.o. male presents with severe end stage hip osteoarthritis which has not been responsive to the full compliment of conservative measures. Despite conservative attempts, there is still severe, constant activity limiting hip pain. Given the severity of the pain, the patient has elected to proceed with hip replacement.  Patient has chronic anemia.  [facility]    Allergies:   Allergies   Allergen Reactions    Sulfa Antibiotics Hives         Current Medications:  Home Medications:    Current Facility-Administered Medications on File Prior to Encounter   Medication    mupirocin (BACTROBAN) 2 % nasal ointment     Current Outpatient Medications on File Prior to Encounter   Medication Sig    acetaminophen (TYLENOL) 325 MG tablet Take 2 tablets by mouth Every 6 (Six) Hours As Needed for Mild Pain or Fever.    aspirin 81 MG EC tablet Take 1 tablet by mouth Every Other Day. HOLD PRIOR TO SURGERY PER MD (Patient not taking: Reported on 12/7/2023)    calcium citrate-vitamin d (CALCITRATE) 315-250 MG-UNIT tablet tablet Take 2 tablets by mouth 2 (Two) Times a Day.    cephalexin (KEFLEX) 500 MG capsule Take 1 capsule by mouth Every Morning.    cetirizine (zyrTEC) 10 MG tablet Take 1 tablet by mouth Daily As Needed for Allergies.    ferrous sulfate 325 (65 FE) MG tablet Take 1 tablet by mouth 2 (Two) Times a Day. (Patient not taking: Reported on 12/7/2023)    finasteride (PROSCAR) 5 MG tablet Take 1 tablet by mouth Daily. (Patient not taking: Reported on 12/7/2023)    furosemide (LASIX) 20 MG tablet Take 1 tablet by mouth Every Morning.    gabapentin (NEURONTIN) 400 MG capsule Take 2 capsules by mouth 2 (Two) Times a Day.    glipizide (GLUCOTROL) 5 MG  tablet Take 1 tablet by mouth Daily.    hydrocortisone 2.5 % cream Apply 1 application  topically to the appropriate area as directed As Needed.    ketoconazole (NIZORAL) 2 % cream Apply 1 application  topically to the appropriate area as directed As Needed.    ketoconazole (NIZORAL) 2 % shampoo Apply 1 application  topically to the appropriate area as directed As Needed.    methocarbamol (ROBAXIN) 500 MG tablet Take 1 tablet by mouth 4 (Four) Times a Day As Needed for Muscle Spasms. (Patient not taking: Reported on 12/7/2023)    Omega-3 Fatty Acids (FISH OIL) 1000 MG capsule capsule Take 1 capsule by mouth 3 (Three) Times a Day With Meals. HOLD PRIOR TO SURG    omeprazole (priLOSEC) 20 MG capsule Take 1 capsule by mouth 2 (Two) Times a Day.    simvastatin (ZOCOR) 20 MG tablet Take 0.5 tablets by mouth Every Night.    vitamin B-12 (CYANOCOBALAMIN) 500 MCG tablet Take 1 tablet by mouth Daily.     PRN Meds:.    PMH:  Past Medical History:   Diagnosis Date    Anemia     CHRONIC    Arthritis     BPH (benign prostatic hyperplasia)     Diabetes mellitus     GERD (gastroesophageal reflux disease)     H/O gastric bypass     High cholesterol     History of bladder cancer     3 YR AGO    History of COVID-19     PT CAN NOT REMEMBER WHEN    History of hepatitis C     treated medically    History of infection     AFTER KNEE SURGERY ON ANTIBIOTICS DAILY    History of kidney stones     History of renal cell cancer     HAD TUMOR REMOVED    History of transfusion     Hypertension     Low back pain     Neck pain     Nerve damage     RIGHT HAND    Neuropathy     FEET    Psoriasis     Right hip pain     Right leg pain     Sleep apnea     NOT CURRENTLY USING MACHINE    Urethral stricture     caths self prn    UTI (urinary tract infection)     freq uti        PSURGH:  Past Surgical History:   Procedure Laterality Date    ANKLE SURGERY Right     orif with hardware    CARPAL TUNNEL RELEASE Left     CATARACT EXTRACTION WITH INTRAOCULAR LENS  IMPLANT Bilateral     COLONOSCOPY      COLONOSCOPY N/A 2022    Procedure: COLONOSCOPY FOR SCREENING;  Surgeon: Mik Joaquin MD;  Location: [facility];  Service: Gastroenterology;  Laterality: N/A;  DIVERTICULOSIS, hemorrhoids    CYSTOSCOPY N/A 2023    Procedure: CYSTOSCOPY FLEXIBLE, insertion of jean baptiste catheter with urethral dilation;  Surgeon: Jermain Duran MD;  Location: [facility] OR;  Service: Urology;  Laterality: N/A;    ENDOSCOPY      GASTRIC BYPASS          HAND SURGERY Right     thumb     KNEE ARTHROPLASTY Left     LUMBAR FUSION N/A 2023    Procedure: LUMBAR THREE TO FOUR, FOUR TO FIVE TRANSFORAMINAL LUMBAR INTERBODY FUSION WITH LUMBAR THREE TO FOUR, FOUR TO FIVE, FIVE TO SACRAL ONE, SACRAL ONE TO TWO INSTRUMENTATION WITH ROBOTIC ASSISTANCE;  Surgeon: Indio Blas MD;  Location: [facility];  Service: Robotics - Neuro;  Laterality: N/A;    NEPHRECTOMY PARTIAL      ORIF WRIST FRACTURE Left     TOTAL SHOULDER ARTHROPLASTY W/ DISTAL CLAVICLE EXCISION Right 2018    Procedure: Reverse Total Shoulder Arthroplasty;  Surgeon: Alex Ordaz MD;  Location: [facility];  Service:     TOTAL SHOULDER ARTHROPLASTY W/ DISTAL CLAVICLE EXCISION Left 2018    Procedure: TOTAL SHOULDER REVERSE ARTHROPLASTY;  Surgeon: Alex Ordaz MD;  Location: [facility];  Service: Orthopedics       SocialHx:  Social History     Occupational History    Not on file   Tobacco Use    Smoking status: Former     Packs/day: 2.00     Years: 15.00     Additional pack years: 0.00     Total pack years: 30.00     Types: Cigarettes     Quit date:      Years since quittin.9    Smokeless tobacco: Never   Vaping Use    Vaping Use: Never used   Substance and Sexual Activity    Alcohol use: Yes     Comment: bourbon/beer on occasion    Drug use: No    Sexual activity: Defer      Social History     Social History Narrative    Not on file       FamHx:  Family History   Problem  Relation Age of Onset    Malig Hyperthermia Neg Hx     Colon cancer Neg Hx          Review of Systems:   A 14 point review of systems was performed, pertinent positives discussed above, all other systems are negative    Physical Exam: 78 y.o. male  Vital Signs :   There were no vitals filed for this visit.    General: Alert and Oriented x 3, No acute distress.  Psych: mood and affect appropriate; recent and remote memory intact  Eyes: conjunctiva clear; pupils equally round and reactive, sclera nonicteric  CV: no peripheral edema  Resp: normal respiratory effort  Skin: no rashes or wounds; normal turgor  Musculosketetal; pain with hip range of motion. Positive stinchfeld test. No trochanteric  Tenderness.  Vascular: palpable distal pulses    Labs:    Pre-Admission Testing on 11/27/2023   Component Date Value Ref Range Status    Glucose 11/27/2023 153 (H)  65 - 99 mg/dL Final    BUN 11/27/2023 20  8 - 23 mg/dL Final    Creatinine 11/27/2023 0.98  0.76 - 1.27 mg/dL Final    Sodium 11/27/2023 139  136 - 145 mmol/L Final    Potassium 11/27/2023 4.4  3.5 - 5.2 mmol/L Final    Chloride 11/27/2023 105  98 - 107 mmol/L Final    CO2 11/27/2023 23.1  22.0 - 29.0 mmol/L Final    Calcium 11/27/2023 9.8  8.6 - 10.5 mg/dL Final    BUN/Creatinine Ratio 11/27/2023 20.4  7.0 - 25.0 Final    Anion Gap 11/27/2023 10.9  5.0 - 15.0 mmol/L Final    eGFR 11/27/2023 79.4  >60.0 mL/min/1.73 Final    WBC 11/27/2023 4.30  3.40 - 10.80 10*3/mm3 Final    RBC 11/27/2023 3.71 (L)  4.14 - 5.80 10*6/mm3 Final    Hemoglobin 11/27/2023 9.9 (L)  13.0 - 17.7 g/dL Final    Hematocrit 11/27/2023 32.6 (L)  37.5 - 51.0 % Final    MCV 11/27/2023 87.9  79.0 - 97.0 fL Final    MCH 11/27/2023 26.7  26.6 - 33.0 pg Final    MCHC 11/27/2023 30.4 (L)  31.5 - 35.7 g/dL Final    RDW 11/27/2023 14.4  12.3 - 15.4 % Final    RDW-SD 11/27/2023 45.3  37.0 - 54.0 fl Final    MPV 11/27/2023 10.6  6.0 - 12.0 fL Final    Platelets 11/27/2023 250  140 - 450 10*3/mm3 Final     QT Interval 11/27/2023 385  ms Final    QTC Interval 11/27/2023 401  ms Final     No results found.        Xrays:  Xrays AP pelvis and a lateral of the Right hip were reviewed demonstrating  End stage hip OA with bone on bone articulation, subchondral cysts and periarticular osteophytes.    Assessment:  End-stage Right hip osteoarthritis. Conservative measures have failed.      Plan:  The plan is to proceed with Right Total Hip Replacement. The patient voiced understanding of the risks, benefits, and alternative forms of treatment that were discussed with Dr Iqbal at the time of scheduling.  Patient is got his lab and EKG these were reviewed.  No changes in EKG.  He is done his joint class.  [facility]  He has been on iron for chronic anemia.  Hemoglobin levels do fluctuate from the upper eights to 11 has been the highest in the past year.  Most recent was 9.9.  The patient will prefer hemoglobin to be a minimum of 10 or above.  However right around this ranges his baseline.  Thus we will get an H&H morning of surgery if we feel it is low surgery may need to be canceled.  Otherwise possible plan for preoperative blood transfusion if needed.  Regardless given his chronic anemia he is at high risk for postoperative transfusion.  Patient is understanding of this.    Renny Iqbal MD  12/13/2023     This note was copied and pasted from most recent office visit.  Interval addendum may be made to update this documentation as needed.

## 2023-12-14 VITALS
SYSTOLIC BLOOD PRESSURE: 106 MMHG | WEIGHT: 224.87 LBS | BODY MASS INDEX: 42.46 KG/M2 | HEIGHT: 61 IN | DIASTOLIC BLOOD PRESSURE: 59 MMHG | RESPIRATION RATE: 16 BRPM | TEMPERATURE: 97.4 F | HEART RATE: 63 BPM | OXYGEN SATURATION: 96 %

## 2023-12-14 PROBLEM — D64.9 ANEMIA: Status: ACTIVE | Noted: 2023-12-14

## 2023-12-14 PROBLEM — I10 HTN (HYPERTENSION): Status: ACTIVE | Noted: 2023-12-14

## 2023-12-14 LAB
ABO GROUP BLD: NORMAL
BLD GP AB SCN SERPL QL: NEGATIVE
GLUCOSE BLDC GLUCOMTR-MCNC: 232 MG/DL (ref 70–130)
GLUCOSE BLDC GLUCOMTR-MCNC: 237 MG/DL (ref 70–130)
HCT VFR BLD AUTO: 28.5 % (ref 37.5–51)
HGB BLD-MCNC: 8.9 G/DL (ref 13–17.7)
RH BLD: POSITIVE
T&S EXPIRATION DATE: NORMAL

## 2023-12-14 PROCEDURE — 36430 TRANSFUSION BLD/BLD COMPNT: CPT

## 2023-12-14 PROCEDURE — 86900 BLOOD TYPING SEROLOGIC ABO: CPT | Performed by: ORTHOPAEDIC SURGERY

## 2023-12-14 PROCEDURE — 85014 HEMATOCRIT: CPT | Performed by: ORTHOPAEDIC SURGERY

## 2023-12-14 PROCEDURE — 86900 BLOOD TYPING SEROLOGIC ABO: CPT

## 2023-12-14 PROCEDURE — G0378 HOSPITAL OBSERVATION PER HR: HCPCS

## 2023-12-14 PROCEDURE — 97161 PT EVAL LOW COMPLEX 20 MIN: CPT

## 2023-12-14 PROCEDURE — 86923 COMPATIBILITY TEST ELECTRIC: CPT

## 2023-12-14 PROCEDURE — 99024 POSTOP FOLLOW-UP VISIT: CPT | Performed by: ORTHOPAEDIC SURGERY

## 2023-12-14 PROCEDURE — 86850 RBC ANTIBODY SCREEN: CPT | Performed by: ORTHOPAEDIC SURGERY

## 2023-12-14 PROCEDURE — 85018 HEMOGLOBIN: CPT | Performed by: ORTHOPAEDIC SURGERY

## 2023-12-14 PROCEDURE — 86901 BLOOD TYPING SEROLOGIC RH(D): CPT | Performed by: ORTHOPAEDIC SURGERY

## 2023-12-14 PROCEDURE — 25810000003 LACTATED RINGERS PER 1000 ML: Performed by: ORTHOPAEDIC SURGERY

## 2023-12-14 PROCEDURE — P9016 RBC LEUKOCYTES REDUCED: HCPCS

## 2023-12-14 PROCEDURE — 82948 REAGENT STRIP/BLOOD GLUCOSE: CPT

## 2023-12-14 PROCEDURE — 97530 THERAPEUTIC ACTIVITIES: CPT

## 2023-12-14 PROCEDURE — 25010000002 CEFAZOLIN IN DEXTROSE 2-4 GM/100ML-% SOLUTION: Performed by: ORTHOPAEDIC SURGERY

## 2023-12-14 RX ORDER — PSEUDOEPHEDRINE HCL 30 MG
100 TABLET ORAL 2 TIMES DAILY
Qty: 60 CAPSULE | Refills: 0 | Status: SHIPPED | OUTPATIENT
Start: 2023-12-14

## 2023-12-14 RX ORDER — MELOXICAM 15 MG/1
15 TABLET ORAL DAILY
Qty: 15 TABLET | Refills: 0 | Status: SHIPPED | OUTPATIENT
Start: 2023-12-14 | End: 2023-12-29

## 2023-12-14 RX ORDER — ASPIRIN 81 MG/1
TABLET ORAL
Start: 2023-12-14

## 2023-12-14 RX ORDER — HYDROCODONE BITARTRATE AND ACETAMINOPHEN 7.5; 325 MG/1; MG/1
1-2 TABLET ORAL EVERY 4 HOURS PRN
Qty: 42 TABLET | Refills: 0 | Status: SHIPPED | OUTPATIENT
Start: 2023-12-14 | End: 2023-12-23

## 2023-12-14 RX ORDER — POLYETHYLENE GLYCOL 3350 17 G/17G
17 POWDER, FOR SOLUTION ORAL 2 TIMES DAILY
Qty: 510 G | Refills: 0 | Status: SHIPPED | OUTPATIENT
Start: 2023-12-14 | End: 2023-12-28

## 2023-12-14 RX ADMIN — SODIUM CHLORIDE, POTASSIUM CHLORIDE, SODIUM LACTATE AND CALCIUM CHLORIDE 100 ML/HR: 600; 310; 30; 20 INJECTION, SOLUTION INTRAVENOUS at 06:23

## 2023-12-14 RX ADMIN — HYDROCODONE BITARTRATE AND ACETAMINOPHEN 1 TABLET: 7.5; 325 TABLET ORAL at 06:36

## 2023-12-14 RX ADMIN — CEFAZOLIN SODIUM 2000 MG: 2 INJECTION, SOLUTION INTRAVENOUS at 06:24

## 2023-12-14 RX ADMIN — HYDROCODONE BITARTRATE AND ACETAMINOPHEN 1 TABLET: 7.5; 325 TABLET ORAL at 11:09

## 2023-12-14 RX ADMIN — POLYETHYLENE GLYCOL 3350 17 G: 17 POWDER, FOR SOLUTION ORAL at 09:29

## 2023-12-14 RX ADMIN — MELOXICAM 15 MG: 15 TABLET ORAL at 09:29

## 2023-12-14 RX ADMIN — ASPIRIN 81 MG: 81 TABLET, COATED ORAL at 12:38

## 2023-12-14 RX ADMIN — HYDROCODONE BITARTRATE AND ACETAMINOPHEN 1 TABLET: 7.5; 325 TABLET ORAL at 15:10

## 2023-12-14 RX ADMIN — DOCUSATE SODIUM 100 MG: 100 CAPSULE, LIQUID FILLED ORAL at 09:29

## 2023-12-14 NOTE — THERAPY EVALUATION
Patient Name: Jermaine Denis  : 1945    MRN: [id number]                              Today's Date: 2023       Admit Date: 2023    Visit Dx:     ICD-10-CM ICD-9-CM   1. Primary osteoarthritis of right hip  M16.11 715.15     Patient Active Problem List   Diagnosis    Rotator cuff tear arthropathy, right    Rotator cuff arthropathy    Left rotator cuff tear arthropathy    Osteoarthritis of left shoulder    Cancer    Encounter for screening colonoscopy    History of colon polyps    Lumbar radiculopathy    Synovial cyst of lumbar facet joint    Spondylolisthesis, lumbar region    History of hepatitis C    BPH (benign prostatic hyperplasia)    Diabetes mellitus    Neuropathy    Pain of right hip    Osteoarthritis of right hip     Past Medical History:   Diagnosis Date    Anemia     CHRONIC    Arthritis     BPH (benign prostatic hyperplasia)     Diabetes mellitus     GERD (gastroesophageal reflux disease)     H/O gastric bypass     High cholesterol     History of bladder cancer     3 YR AGO    History of COVID-19     PT CAN NOT REMEMBER WHEN    History of hepatitis C     treated medically    History of infection     AFTER KNEE SURGERY ON ANTIBIOTICS DAILY    History of kidney stones     History of renal cell cancer     HAD TUMOR REMOVED    History of transfusion     Hypertension     Low back pain     Neck pain     Nerve damage     RIGHT HAND    Neuropathy     FEET    Psoriasis     Right hip pain     Right leg pain     Sleep apnea     NOT CURRENTLY USING MACHINE    Urethral stricture     caths self prn    UTI (urinary tract infection)     freq uti     Past Surgical History:   Procedure Laterality Date    ANKLE SURGERY Right     orif with hardware    CARPAL TUNNEL RELEASE Left     CATARACT EXTRACTION WITH INTRAOCULAR LENS IMPLANT Bilateral     COLONOSCOPY      COLONOSCOPY N/A 2022    Procedure: COLONOSCOPY FOR SCREENING;  Surgeon: Mik Joaquin MD;  Location: [facility] ENDOSCOPY;  Service:  Gastroenterology;  Laterality: N/A;  DIVERTICULOSIS, hemorrhoids    CYSTOSCOPY N/A 02/27/2023    Procedure: CYSTOSCOPY FLEXIBLE, insertion of jean baptiste catheter with urethral dilation;  Surgeon: Jermain Duran MD;  Location: [facility];  Service: Urology;  Laterality: N/A;    ENDOSCOPY      GASTRIC BYPASS      2008    HAND SURGERY Right     thumb     KNEE ARTHROPLASTY Left     LUMBAR FUSION N/A 02/27/2023    Procedure: LUMBAR THREE TO FOUR, FOUR TO FIVE TRANSFORAMINAL LUMBAR INTERBODY FUSION WITH LUMBAR THREE TO FOUR, FOUR TO FIVE, FIVE TO SACRAL ONE, SACRAL ONE TO TWO INSTRUMENTATION WITH ROBOTIC ASSISTANCE;  Surgeon: Indio Blas MD;  Location: [facility];  Service: Robotics - Neuro;  Laterality: N/A;    NEPHRECTOMY PARTIAL      ORIF WRIST FRACTURE Left     TOTAL SHOULDER ARTHROPLASTY W/ DISTAL CLAVICLE EXCISION Right 01/18/2018    Procedure: Reverse Total Shoulder Arthroplasty;  Surgeon: Alex Ordaz MD;  Location: [facility];  Service:     TOTAL SHOULDER ARTHROPLASTY W/ DISTAL CLAVICLE EXCISION Left 06/28/2018    Procedure: TOTAL SHOULDER REVERSE ARTHROPLASTY;  Surgeon: Alex Ordaz MD;  Location: [facility];  Service: Orthopedics      General Information       Row Name 12/14/23 0921          Physical Therapy Time and Intention    Document Type discharge evaluation/summary  -CS     Mode of Treatment individual therapy;physical therapy  -CS       Row Name 12/14/23 0921          General Information    Patient Profile Reviewed yes  -CS     Prior Level of Function independent:;gait;transfer;bed mobility  SC  -CS     Existing Precautions/Restrictions fall;right;hip, posterior  -CS     Barriers to Rehab none identified  -CS       Row Name 12/14/23 0921          Living Environment    People in Home spouse  -CS       Row Name 12/14/23 0921          Home Main Entrance    Number of Stairs, Main Entrance none;other (see comments)  ramp  -CS       Row Name 12/14/23 0921          Stairs Within Home,  Primary    Number of Stairs, Within Home, Primary none  -CS       Row Name 12/14/23 0921          Cognition    Orientation Status (Cognition) oriented x 4  -CS       Row Name 12/14/23 0921          Safety Issues, Functional Mobility    Impairments Affecting Function (Mobility) endurance/activity tolerance;strength  -CS               User Key  (r) = Recorded By, (t) = Taken By, (c) = Cosigned By      Initials Name Provider Type    CS Lynda Wilson, PT Physical Therapist                   Mobility       Row Name 12/14/23 0922          Bed Mobility    Bed Mobility supine-sit  -CS     Supine-Sit [place] (Bed Mobility) standby assist  -CS     Assistive Device (Bed Mobility) head of bed elevated  -CS     Comment, (Bed Mobility) UIC at end of session  -CS       Row Name 12/14/23 0922          Sit-Stand Transfer    Sit-Stand [place] (Transfers) standby assist;verbal cues  -CS     Assistive Device (Sit-Stand Transfers) walker, front-wheeled  -CS     Comment, (Sit-Stand Transfer) cues for UE placement  -CS       Row Name 12/14/23 0922          Gait/Stairs (Locomotion)    [place] Level (Gait) standby assist;contact guard;verbal cues  -CS     Assistive Device (Gait) walker, front-wheeled  -CS     Distance in Feet (Gait) 200'  -CS     Deviations/Abnormal Patterns (Gait) antalgic;nga decreased;gait speed decreased  -CS     Right Sided Gait Deviations weight shift ability decreased  -CS     [place] Level (Stairs) not tested  -CS     Comment, (Gait/Stairs) slow antalgic gait but able to progress to a step-through pattern  -CS       Row Name 12/14/23 0922          Mobility    Extremity Weight-bearing Status right lower extremity  -CS     Right Lower Extremity (Weight-bearing Status) weight-bearing as tolerated (WBAT)  -CS               User Key  (r) = Recorded By, (t) = Taken By, (c) = Cosigned By      Initials Name Provider Type    CS Lynda Wilson, PT Physical Therapist                    Obj/Interventions       Row Name 12/14/23 0922          Range of Motion Comprehensive    General Range of Motion bilateral lower extremity ROM WFL  -CS       Row Name 12/14/23 0922          Strength Comprehensive (MMT)    General Manual Muscle Testing (MMT) Assessment other (see comments)  -     Comment, General Manual Muscle Testing (MMT) Assessment R LE limtied secondary to post-op  -       Row Name 12/14/23 0922          Motor Skills    Therapeutic Exercise other (see comments)  10 reps R NICOLE protocol  -       Row Name 12/14/23 0922          Balance    Balance Assessment sitting static balance;sitting dynamic balance;standing static balance;standing dynamic balance  -     Static Sitting Balance supervision  -     Dynamic Sitting Balance standby assist  -CS     Position, Sitting Balance unsupported;sitting edge of bed  -     Static Standing Balance standby assist  -CS     Dynamic Standing Balance standby assist;contact guard  -     Position/Device Used, Standing Balance supported;walker, front-wheeled  -               User Key  (r) = Recorded By, (t) = Taken By, (c) = Cosigned By      Initials Name Provider Type    Lynda Menchaca PT Physical Therapist                   Goals/Plan    No documentation.                  Clinical Impression       Row Name 12/14/23 0923          Pain    Pretreatment Pain Rating 0/10 - no pain  -       Row Name 12/14/23 0923          Plan of Care Review    Plan of Care Reviewed With patient  -     Outcome Evaluation Pt is a 77 y/o M POD 1 s/p R posterior NICOLE. Pt reports he lives with his spouse with a ramp to enter home. Pt uses a SC for mobility at  - owns RW as well. Pt presents to PT with expected post-op pain and weakness. PT reviewed posterior hip precautions and pt completed NICOLE protovol x 10 prior to mobility. Pt preformed supine to sit, STS transfer, and ambulated 200' c RW requiring SBA/CGA. Pt demo's a slow antalgic gait but able to progress to a  step-through pattern. PT provided education regarding HEP, home safety/stair navigation, and post-op care. Pt is safe to D/C home with assist and f/u with HHPT.  -CS       Row Name 12/14/23 0923          Therapy Assessment/Plan (PT)    Criteria for Skilled Interventions Met (PT) yes;meets criteria  -CS     Therapy Frequency (PT) evaluation only  -CS       Row Name 12/14/23 0923          Positioning and Restraints    Pre-Treatment Position in bed  -CS     Post Treatment Position chair  -CS     In Chair reclined;call light within reach;encouraged to call for assist;exit alarm on;heels elevated  -CS               User Key  (r) = Recorded By, (t) = Taken By, (c) = Cosigned By      Initials Name Provider Type    CS Lynda Wilson, PT Physical Therapist                   Outcome Measures       Row Name 12/14/23 0927          How much help from another person do you currently need...    Turning from your back to your side while in flat bed without using bedrails? 4  -CS     Moving from lying on back to sitting on the side of a flat bed without bedrails? 4  -CS     Moving to and from a bed to a chair (including a wheelchair)? 4  -CS     Standing up from a chair using your arms (e.g., wheelchair, bedside chair)? 4  -CS     Climbing 3-5 steps with a railing? 3  -CS     To walk in hospital room? 3  -CS     AM-PAC 6 Clicks Score (PT) 22  -CS     Highest Level of Mobility Goal 7 --> Walk 25 feet or more  -CS       Row Name 12/14/23 0927          Functional Assessment    Outcome Measure Options AM-PAC 6 Clicks Basic Mobility (PT)  -CS               User Key  (r) = Recorded By, (t) = Taken By, (c) = Cosigned By      Initials Name Provider Type    CS Lynda Wilson, PT Physical Therapist                                 Physical Therapy Education       Title: PT OT SLP Therapies (Done)       Topic: Physical Therapy (Done)       Point: Mobility training (Done)       Learning Progress Summary             Patient Acceptance, E,TB,H,  VU,DU by  at 12/14/2023 0928                         Point: Home exercise program (Done)       Learning Progress Summary             Patient Acceptance, E,TB,H, VU,DU by  at 12/14/2023 0928                         Point: Body mechanics (Done)       Learning Progress Summary             Patient Acceptance, E,TB,H, VU,DU by  at 12/14/2023 0928                         Point: Precautions (Done)       Learning Progress Summary             Patient Acceptance, E,TB,H, VU,DU by  at 12/14/2023 0928                                         User Key       Initials Effective Dates Name Provider Type Discipline     09/22/22 -  Lynda Wilson, PT Physical Therapist PT                  PT Recommendation and Plan     Plan of Care Reviewed With: patient  Outcome Evaluation: Pt is a 77 y/o M POD 1 s/p R posterior NICOLE. Pt reports he lives with his spouse with a ramp to enter home. Pt uses a SC for mobility at  - owns RW as well. Pt presents to PT with expected post-op pain and weakness. PT reviewed posterior hip precautions and pt completed NICOLE protovol x 10 prior to mobility. Pt preformed supine to sit, STS transfer, and ambulated 200' c RW requiring SBA/CGA. Pt demo's a slow antalgic gait but able to progress to a step-through pattern. PT provided education regarding HEP, home safety/stair navigation, and post-op care. Pt is safe to D/C home with assist and f/u with HHPT.     Time Calculation:         PT Charges       Row Name 12/14/23 0928             Time Calculation    Start Time 0847  -CS      Stop Time 0912  -CS      Time Calculation (min) 25 min  -CS      PT Received On 12/14/23  -         Time Calculation- PT    Total Timed Code Minutes- PT 20 minute(s)  -CS         Timed Charges    81454 - Gait Training Minutes  8  -CS      31225 - PT Therapeutic Activity Minutes 12  -CS         Total Minutes    Timed Charges Total Minutes 20  -CS       Total Minutes 20  -CS                User Key  (r) = Recorded By, (t) =  Taken By, (c) = Cosigned By      Initials Name Provider Type    CS Lynda Wilson, PT Physical Therapist                  Therapy Charges for Today       Code Description Service Date Service Provider Modifiers Qty    11307038987 HC PT THERAPEUTIC ACT EA 15 MIN 12/14/2023 Lynda Wilson, PT GP 1    63300170384 HC PT EVAL LOW COMPLEXITY 3 12/14/2023 Lynda Wilson, PT GP 1            PT G-Codes  Outcome Measure Options: AM-PAC 6 Clicks Basic Mobility (PT)  AM-PAC 6 Clicks Score (PT): 22  PT Discharge Summary  Anticipated Discharge Disposition (PT): home with assist, home with home health    Lynda Wilson PT  12/14/2023

## 2023-12-14 NOTE — PLAN OF CARE
Goal Outcome Evaluation:  Plan of Care Reviewed With: patient           Outcome Evaluation: Pt is a 79 y/o M POD 1 s/p R posterior NICOLE. Pt reports he lives with his spouse with a ramp to enter home. Pt uses a SC for mobility at  - owns RW as well. Pt presents to PT with expected post-op pain and weakness. PT reviewed posterior hip precautions and pt completed NICOLE protovol x 10 prior to mobility. Pt preformed supine to sit, STS transfer, and ambulated 200' c RW requiring SBA/CGA. Pt demo's a slow antalgic gait but able to progress to a step-through pattern. PT provided education regarding HEP, home safety/stair navigation, and post-op care. Pt is safe to D/C home with assist and f/u with HHPT.      Anticipated Discharge Disposition (PT): home with assist, home with home health

## 2023-12-14 NOTE — DISCHARGE PLACEMENT REQUEST
"Yenni Denis (78 y.o. Male)       Date of Birth   1945    Social Security Number       Address   [address] [place], [place] [address] [id number]    Home Phone   [phone]    MRN   [id number]       [facility]    Marital Status                               Admission Date   12/13/23    Admission Type   Elective    Admitting Provider   Renny Iqbal MD    Attending Provider   Renny Iqbal MD    Department, Room/Bed   [address], P783/1       Discharge Date       Discharge Disposition   Home or Self Care    Discharge Destination                                 Attending Provider: Renny Iqbal MD    Allergies: Sulfa Antibiotics    Isolation: None   Infection: None   Code Status: Prior    Ht: 154.9 cm (60.98\")   Wt: 102 kg (224 lb 13.9 oz)    Admission Cmt: None   Principal Problem: Osteoarthritis of right hip [M16.11]                   Active Insurance as of 12/13/2023       Primary Coverage       Payor Plan Insurance Group Employer/Plan Group    [facility] CCN [id number]        Payor Plan Address Payor Plan Phone Number Payor Plan Fax Number Effective Dates    [address] [phone]  10/7/1998 - None Entered    [facility] [id number]         Subscriber Name Subscriber Birth Date Member ID       YENNI DENIS 1945 [id number]                     Emergency Contacts        (Rel.) Home Phone Work Phone Mobile Phone    Liz Denis (Spouse) [phone] -- [phone]            "

## 2023-12-14 NOTE — CONSULTS
CONSULT NOTE    INTERNAL MEDICINE   [facility]       Patient Identification:  Name: Jermaine Denis  Age: 78 y.o.  Sex: male  :  1945  MRN: [id number]             Date of Consultation:  23          Primary Care Physician: Linh Mckeon MD                               Requesting Physician: dr farfan  Reason for Consultation:medical management    Chief Complaint:  78 year old gentleman who was admitted after a hip replacement by dr farfan; we are asked to see him regarding medical management; he has a history of diabetes, hypertension, sleep apnea and anemia; he is doing well postop    History of Present Illness:   As above      Past Medical History:  Past Medical History:   Diagnosis Date    Anemia     CHRONIC    Arthritis     BPH (benign prostatic hyperplasia)     Diabetes mellitus     GERD (gastroesophageal reflux disease)     H/O gastric bypass     High cholesterol     History of bladder cancer     3 YR AGO    History of COVID-19     PT CAN NOT REMEMBER WHEN    History of hepatitis C     treated medically    History of infection     AFTER KNEE SURGERY ON ANTIBIOTICS DAILY    History of kidney stones     History of renal cell cancer     HAD TUMOR REMOVED    History of transfusion     Hypertension     Low back pain     Neck pain     Nerve damage     RIGHT HAND    Neuropathy     FEET    Psoriasis     Right hip pain     Right leg pain     Sleep apnea     NOT CURRENTLY USING MACHINE    Urethral stricture     caths self prn    UTI (urinary tract infection)     freq uti     Past Surgical History:  Past Surgical History:   Procedure Laterality Date    ANKLE SURGERY Right     orif with hardware    CARPAL TUNNEL RELEASE Left     CATARACT EXTRACTION WITH INTRAOCULAR LENS IMPLANT Bilateral     COLONOSCOPY      COLONOSCOPY N/A 2022    Procedure: COLONOSCOPY FOR SCREENING;  Surgeon: Mik Joaquin MD;  Location: [facility] ENDOSCOPY;  Service: Gastroenterology;  Laterality: N/A;   DIVERTICULOSIS, hemorrhoids    CYSTOSCOPY N/A 02/27/2023    Procedure: CYSTOSCOPY FLEXIBLE, insertion of jean baptiste catheter with urethral dilation;  Surgeon: Jermain Duran MD;  Location: [facility] OR;  Service: Urology;  Laterality: N/A;    ENDOSCOPY      GASTRIC BYPASS      2008    HAND SURGERY Right     thumb     KNEE ARTHROPLASTY Left     LUMBAR FUSION N/A 02/27/2023    Procedure: LUMBAR THREE TO FOUR, FOUR TO FIVE TRANSFORAMINAL LUMBAR INTERBODY FUSION WITH LUMBAR THREE TO FOUR, FOUR TO FIVE, FIVE TO SACRAL ONE, SACRAL ONE TO TWO INSTRUMENTATION WITH ROBOTIC ASSISTANCE;  Surgeon: Indio Blas MD;  Location: [facility] OR;  Service: Robotics - Neuro;  Laterality: N/A;    NEPHRECTOMY PARTIAL      ORIF WRIST FRACTURE Left     TOTAL SHOULDER ARTHROPLASTY W/ DISTAL CLAVICLE EXCISION Right 01/18/2018    Procedure: Reverse Total Shoulder Arthroplasty;  Surgeon: Alex Ordaz MD;  Location: [facility] OR;  Service:     TOTAL SHOULDER ARTHROPLASTY W/ DISTAL CLAVICLE EXCISION Left 06/28/2018    Procedure: TOTAL SHOULDER REVERSE ARTHROPLASTY;  Surgeon: Alex Ordaz MD;  Location: [facility] OR;  Service: Orthopedics      Home Meds:  Medications Prior to Admission   Medication Sig Dispense Refill Last Dose    acetaminophen (TYLENOL) 325 MG tablet Take 2 tablets by mouth Every 6 (Six) Hours As Needed for Mild Pain or Fever.   Past Week    cephalexin (KEFLEX) 500 MG capsule Take 1 capsule by mouth Every Morning.   12/12/2023    cetirizine (zyrTEC) 10 MG tablet Take 1 tablet by mouth Daily As Needed for Allergies.   12/12/2023    ferrous sulfate 325 (65 FE) MG tablet Take 1 tablet by mouth 2 (Two) Times a Day.   Past Week    finasteride (PROSCAR) 5 MG tablet Take 1 tablet by mouth Daily.   12/12/2023    furosemide (LASIX) 20 MG tablet Take 1 tablet by mouth Every Morning.   12/12/2023    gabapentin (NEURONTIN) 400 MG capsule Take 2 capsules by mouth 2 (Two) Times a Day.   12/12/2023    glipizide (GLUCOTROL) 5 MG  tablet Take 1 tablet by mouth Daily.   12/12/2023    HYDROcodone-acetaminophen (NORCO) 5-325 MG per tablet Take 1 tablet by mouth At Night As Needed.   Past Month    Melatonin 10 MG tablet Take 1-2 tablets by mouth At Night As Needed.   12/12/2023    Omega-3 Fatty Acids (FISH OIL) 1000 MG capsule capsule Take 1 capsule by mouth 3 (Three) Times a Day With Meals. HOLD PRIOR TO SURG   Past Month    omeprazole (priLOSEC) 20 MG capsule Take 1 capsule by mouth 2 (Two) Times a Day.   12/13/2023    Probiotic Product (PROBIOTIC DAILY PO) Take 1 tablet by mouth Daily.   12/12/2023    simvastatin (ZOCOR) 20 MG tablet Take 0.5 tablets by mouth Every Night.   12/12/2023    tamsulosin (FLOMAX) 0.4 MG capsule 24 hr capsule Take 1 capsule by mouth Every Night.   Past Week    vitamin B-12 (CYANOCOBALAMIN) 500 MCG tablet Take 1 tablet by mouth Daily.   Past Week    aspirin 81 MG EC tablet Take 1 tablet by mouth Every Other Day. HOLD PRIOR TO SURGERY PER MD (Patient not taking: Reported on 12/7/2023)   11/29/2023    calcium citrate-vitamin d (CALCITRATE) 315-250 MG-UNIT tablet tablet Take 2 tablets by mouth 2 (Two) Times a Day.       hydrocortisone 2.5 % cream Apply 1 application  topically to the appropriate area as directed As Needed.   More than a month    ketoconazole (NIZORAL) 2 % cream Apply 1 application  topically to the appropriate area as directed As Needed.   More than a month    ketoconazole (NIZORAL) 2 % shampoo Apply 1 application  topically to the appropriate area as directed As Needed.   More than a month    methocarbamol (ROBAXIN) 500 MG tablet Take 1 tablet by mouth 4 (Four) Times a Day As Needed for Muscle Spasms. (Patient not taking: Reported on 12/7/2023) 40 tablet 1 More than a month    naproxen sodium (ALEVE) 220 MG tablet Take 1 tablet by mouth 2 (Two) Times a Day As Needed. PT HOLDING FOR SURGERY (Patient not taking: Reported on 12/7/2023)       traZODone (DESYREL) 50 MG tablet Take 1 tablet by mouth At Night  As Needed for Sleep.        Current Meds:     Current Facility-Administered Medications:     acetaminophen (TYLENOL) tablet 325 mg, 325 mg, Oral, Q4H PRN, Renny Iqbal MD    [START ON 12/14/2023] aspirin EC tablet 81 mg, 81 mg, Oral, Q12H, Renny Iqbal MD    bisacodyl (DULCOLAX) suppository 10 mg, 10 mg, Rectal, Daily PRN, Renny Iqbal MD    ceFAZolin in dextrose (ANCEF) IVPB solution 2,000 mg, 2,000 mg, Intravenous, Q8H, Renny Iqbal MD    Chlorhexidine Gluconate Cloth 2 % pads, , Apply externally, Take As Directed, Renny Iqbal MD    docusate sodium (COLACE) capsule 100 mg, 100 mg, Oral, BID, Renny Iqbal MD    HYDROcodone-acetaminophen (NORCO) 7.5-325 MG per tablet 1 tablet, 1 tablet, Oral, Q4H PRN, Renny Iqbal MD    HYDROcodone-acetaminophen (NORCO) 7.5-325 MG per tablet 2 tablet, 2 tablet, Oral, Q4H PRN, Renny Iqbal MD    ketorolac (TORADOL) injection 15 mg, 15 mg, Intravenous, Q6H PRN, Renny Iqbal MD, 15 mg at 12/13/23 1814    lactated ringers infusion, 9 mL/hr, Intravenous, Continuous, Bennett Martini MD, Last Rate: 9 mL/hr at 12/13/23 1157, New Bag at 12/13/23 1356    lactated ringers infusion, 100 mL/hr, Intravenous, Continuous, Renny Iqbal MD, Last Rate: 100 mL/hr at 12/13/23 1814, 100 mL/hr at 12/13/23 1814    magnesium hydroxide (MILK OF MAGNESIA) suspension 10 mL, 10 mL, Oral, Daily PRN, Renny Iqbal MD    [START ON 12/14/2023] meloxicam (MOBIC) tablet 15 mg, 15 mg, Oral, Daily, Renny Iqbal MD    ondansetron (ZOFRAN) tablet 4 mg, 4 mg, Oral, Q6H PRN **OR** ondansetron (ZOFRAN) injection 4 mg, 4 mg, Intravenous, Q6H PRN, Renny Iqbal MD    polyethylene glycol (MIRALAX) packet 17 g, 17 g, Oral, BID, Renny Iqbal MD    Facility-Administered Medications Ordered in Other Encounters:     mupirocin (BACTROBAN) 2 % nasal ointment, , Nasal, BID, Alex Ordaz MD  Allergies:  Allergies   Allergen Reactions    Sulfa Antibiotics Hives  "    Social History:   Social History     Socioeconomic History    Marital status:    Tobacco Use    Smoking status: Former     Packs/day: 2.00     Years: 15.00     Additional pack years: 0.00     Total pack years: 30.00     Types: Cigarettes     Quit date:      Years since quittin.9    Smokeless tobacco: Never   Vaping Use    Vaping Use: Never used   Substance and Sexual Activity    Alcohol use: Yes     Comment: bourbon/beer on occasion    Drug use: No    Sexual activity: Defer     Family History:  Family History   Problem Relation Age of Onset    Malig Hyperthermia Neg Hx     Colon cancer Neg Hx           Review of Systems  See history of present illness and past medical history.  Patient denies headache, dizziness, syncope, falls, trauma, change in vision, change in hearing, change in taste, changes in weight, changes in appetite, focal weakness, numbness, or paresthesia.  Patient denies chest pain, palpitations, dyspnea, orthopnea, PND, cough, sinus pressure, rhinorrhea, epistaxis, hemoptysis, nausea, vomiting,hematemesis, diarrhea, constipation or hematochezia. Denies cold or heat intolerance, polydipsia, polyuria, polyphagia. Denies hematuria, pyuria, dysuria, hesitancy, frequency or urgency. Denies consumption of raw and under cooked meats foods or change in water source.  Denies fever, chills, sweats, night sweats.  Denies missing any routine medications. Remainder of ROS is negative.      Vitals:   /58 (BP Location: Left arm, Patient Position: Lying)   Pulse 84   Temp 97.5 °F (36.4 °C) (Oral)   Resp 16   Ht 154.9 cm (60.98\")   Wt 102 kg (224 lb 13.9 oz)   SpO2 96%   BMI 42.51 kg/m²   I/O:   Intake/Output Summary (Last 24 hours) at 2023  Last data filed at 2023 1715  Gross per 24 hour   Intake 1120 ml   Output --   Net 1120 ml     Exam:  General Appearance:    Alert, cooperative, no distress, appears stated age   Head:    Normocephalic, without obvious " abnormality, atraumatic   Eyes:    PERRL, conjunctivae/corneas clear, EOM's intact, both eyes   Ears:    Normal external ear canals, both ears   Nose:   Nares normal, septum midline, mucosa normal, no drainage    or sinus tenderness   Throat:   Lips, tongue, gums normal; oral mucosa pink and moist   Neck:   Supple, symmetrical, trachea midline, no adenopathy;     thyroid:  no enlargement/tenderness/nodules; no carotid    bruit or JVD   Back:     Symmetric, no curvature, ROM normal, no CVA tenderness   Lungs:     Clear to auscultation bilaterally, respirations unlabored   Chest Wall:    No tenderness or deformity    Heart:    Regular rate and rhythm, S1 and S2 normal, no murmur, rub   or gallop   Abdomen:     Soft, nontender, bowel sounds active all four quadrants,     no masses, no hepatomegaly, no splenomegaly   Extremities:   Extremities normal, atraumatic, no cyanosis or edema                Data Review:  Labs in chart were reviewed.            Imaging Results (Last 7 Days)       Procedure Component Value Units Date/Time    XR Hip With or Without Pelvis 1 View Right [[id number]] Collected: 12/13/23 1848     Updated: 12/13/23 1852    Narrative:      XR HIP W OR WO PELVIS 1 VIEW RIGHT-12/13/2023     HISTORY: Postop right hip surgery.     Acetabular and proximal femoral components of the right hip prosthesis  are well seated with no abnormal surrounding bony lucencies. Lumbosacral  fusion hardware is seen similar to the 9/8/2023 and 9/15/2023 studies.     No acute bony abnormalities are seen.        This report was finalized on 12/13/2023 6:48 PM by Dr. Elton Jenkins M.D on Workstation: [id number]       XR Hip 1 View Without Pelvis Right (Surgery Only) [[id number]] Collected: 12/13/23 1602     Updated: 12/13/23 1611    Narrative:      XR HIP 1 VIEW WO PELVIS RIGHT-12/13/2023     HISTORY: Postop right hip arthroplasty.     The acetabular and proximal femoral components of the right hip  prosthesis are well seated  with no abnormal surrounding bony lucencies.  No unexpected findings are noted.       Impression:      1. Satisfactory postoperative appearance of the right hip.        This report was finalized on 12/13/2023 4:08 PM by Dr. Elton Jenkins M.D on Workstation: [id number]       FL C Arm During Surgery [[id number]] Resulted: 12/13/23 1503     Updated: 12/13/23 1503    Narrative:      This procedure was auto-finalized with no dictation required.          Past Medical History:   Diagnosis Date    Anemia     CHRONIC    Arthritis     BPH (benign prostatic hyperplasia)     Diabetes mellitus     GERD (gastroesophageal reflux disease)     H/O gastric bypass     High cholesterol     History of bladder cancer     3 YR AGO    History of COVID-19     PT CAN NOT REMEMBER WHEN    History of hepatitis C     treated medically    History of infection     AFTER KNEE SURGERY ON ANTIBIOTICS DAILY    History of kidney stones     History of renal cell cancer     HAD TUMOR REMOVED    History of transfusion     Hypertension     Low back pain     Neck pain     Nerve damage     RIGHT HAND    Neuropathy     FEET    Psoriasis     Right hip pain     Right leg pain     Sleep apnea     NOT CURRENTLY USING MACHINE    Urethral stricture     caths self prn    UTI (urinary tract infection)     freq uti       Assessment:  Active Hospital Problems    Diagnosis  POA    **Osteoarthritis of right hip [M16.11]  Unknown      Resolved Hospital Problems   No resolved problems to display.   Anemia  Diabetes  Hypertension  Obesity  Sleep apnea  hyperlipidemia    Plan:  Will follow with you  Accu checks, insulin sliding scale  Trend labs  Dw patient   Thanks for involving us in his care    Shari Murrell MD   12/13/2023  20:08 EST

## 2023-12-14 NOTE — PROGRESS NOTES
Orthopedic Progress Note      Patient: Jermaine Denis  Date of Admission: 12/13/2023  YOB: 1945  Medical Record Number: [id number]    POD # :  1 Day Post-Op Procedure(s) (LRB):  Right posterior total hip arthroplasty (Right)    Patient seen examined's morning.  States he is doing well.  Pain controlled.  TomoTherapy yesterday.  No complaints.      Physical Exam:  78 y.o.  male  Vitals:  Temp:  [97.3 °F (36.3 °C)-98.8 °F (37.1 °C)] 97.3 °F (36.3 °C)  Heart Rate:  [66-84] 68  Resp:  [14-16] 16  BP: ()/(52-91) 110/52  alert and oriented    Ext: NV intact. ROM appropriate. Calf is soft and nontender. Negative Homans Sn.  2+ pedal pulses  Skin: Incision clean dry and intact w/out signs or  symptoms of infection.    Activity: Mobilizing Per P.T.   Weight Bearing: As Tolerated    Data Review     Admission on 12/13/2023   Component Date Value Ref Range Status    Hemoglobin A1C 11/27/2023 7.20 (H)  4.80 - 5.60 % Final    ABO Type 11/27/2023 A   Final    RH type 11/27/2023 Positive   Final    Antibody Screen 11/27/2023 Negative   Final    T&S Expiration Date 11/27/2023 11/30/2023 11:59:59 PM   Final    Hemoglobin 12/13/2023 10.2 (L)  13.0 - 17.7 g/dL Final    Hematocrit 12/13/2023 33.3 (L)  37.5 - 51.0 % Final    Glucose 12/13/2023 208 (H)  70 - 130 mg/dL Final    Glucose 12/13/2023 242 (H)  70 - 130 mg/dL Final    Glucose 12/13/2023 343 (H)  70 - 130 mg/dL Final    Hemoglobin 12/14/2023 8.9 (L)  13.0 - 17.7 g/dL Final    Hematocrit 12/14/2023 28.5 (L)  37.5 - 51.0 % Final    Glucose 12/13/2023 321 (H)  70 - 130 mg/dL Final    Glucose 12/14/2023 237 (H)  70 - 130 mg/dL Final       No results found.    Medications:  aspirin, 81 mg, Oral, Q12H  Chlorhexidine Gluconate Cloth, , Apply externally, Take As Directed  docusate sodium, 100 mg, Oral, BID  meloxicam, 15 mg, Oral, Daily  polyethylene glycol, 17 g, Oral, BID        acetaminophen    bisacodyl    HYDROcodone-acetaminophen    HYDROcodone-acetaminophen     ketorolac    magnesium hydroxide    ondansetron **OR** ondansetron      Imaging: Imaging shows status post right total hip implant satisfactory position no acute complications noted.      Assessment:  Doing well POD  # 1 Day Post-Op Procedure(s) (LRB):  Right posterior total hip arthroplasty (Right)  Problems Addressed this Visit          Musculoskeletal and Injuries    * (Principal) Osteoarthritis of right hip    Relevant Medications    lactated ringers bolus 500 mL (Completed)    ethyl alcohol 62 % 2 each (Completed)    ceFAZolin in dextrose (ANCEF) IVPB solution 2 g (Completed)    vancomycin IVPB 1500 mg in 0.9% NaCl (Premix) 500 mL (Completed)    meloxicam (MOBIC) tablet 15 mg (Completed)    pregabalin (LYRICA) capsule 150 mg (Completed)    Chlorhexidine Gluconate Cloth 2 % pads    Other Relevant Orders    Hemoglobin A1c (Completed)    Type & Screen (Completed)    Walker  Walker Folding with Wheels    Ambulatory Referral to Home Health     Diagnoses         Codes Comments    Primary osteoarthritis of right hip     ICD-10-CM: M16.11  ICD-9-CM: 715.15             Plan:  Posterior hip precautions  Continue efforts to mobilize  Continue Pain Control Measures  Continue incisional Care  DVT prophylaxis    Discharge Plan:Home today pending medical and PT clearance.  Also plan to transfuse given his chronic anemia, 1 unit prior to leaving today.    Renny Iqbal MD    Date: 12/14/2023  Time: 09:23 EST

## 2023-12-14 NOTE — PLAN OF CARE
Goal Outcome Evaluation:  Plan of Care Reviewed With: patient      Vss, nvi, ZACKARY dressing c/d/I, prn meds given for pain, plans to d/c home tomorrow, pt still dtv, educated on bp meds and monitoring.   Progress: improving

## 2023-12-14 NOTE — PROGRESS NOTES
Name: Jermaine Denis ADMIT: 2023   : 1945  PCP: Linh Mckeon MD    MRN: [id number] LOS: 0 days   AGE/SEX: 78 y.o. male  ROOM: [facility]     Subjective   Subjective   [facility] consulted yesterday for medical management of the patient's diabetes after hip replacement.  Seen this morning and he is feeling pretty well.  Having some pain at his hip site but otherwise no complaints.  No chest pain or shortness of breath.    Objective   Objective     Vital Signs  Temp:  [97.3 °F (36.3 °C)-98.8 °F (37.1 °C)] 98.1 °F (36.7 °C)  Heart Rate:  [60-84] 64  Resp:  [14-16] 16  BP: ()/(52-91) 124/71  SpO2:  [93 %-100 %] 95 %  on  Flow (L/min):  [2-4] 2;   Device (Oxygen Therapy): room air  Body mass index is 42.51 kg/m².    Physical Exam  General: Alert, no acute distress.  Sitting up in bed.  Very pleasant and conversive.  ENT: No conjunctival injection or scleral icterus. Moist mucous membranes. No JVD.   Neuro: Eyes open and moving in all directions, strength normal in all extremities, no focal deficits.   Lungs: Clear to auscultation bilaterally. No wheeze or crackles. No distress.   Heart: RRR, no murmurs.   Abdomen: Soft, non-tender, non-distended. Normal bowel sounds. No hepatosplenomegaly.   Ext: Right lower extremity with minimal edema, surgical dressing in place.  Skin: No rashes or lesions. IV site without swelling or erythema.     Results Review     I reviewed the patient's new clinical results:  Results from last 7 days   Lab Units 23  0623 23  1000   HEMOGLOBIN g/dL 8.9* 10.2*             Glucose   Date/Time Value Ref Range Status   2023 1117 232 (H) 70 - 130 mg/dL Final   2023 0720 237 (H) 70 - 130 mg/dL Final   2023 2248 321 (H) 70 - 130 mg/dL Final   2023 2037 343 (H) 70 - 130 mg/dL Final   2023 1717 242 (H) 70 - 130 mg/dL Final   2023 1528 208 (H) 70 - 130 mg/dL Final       XR Hip 1 View Without Pelvis Right (Surgery Only)    Result Date:  12/13/2023  1. Satisfactory postoperative appearance of the right hip.   This report was finalized on 12/13/2023 4:08 PM by Dr. Elton Jenkins M.D on Workstation: [id number]       I have personally reviewed all medications:  Scheduled Medications  aspirin, 81 mg, Oral, Q12H  Chlorhexidine Gluconate Cloth, , Apply externally, Take As Directed  docusate sodium, 100 mg, Oral, BID  meloxicam, 15 mg, Oral, Daily  polyethylene glycol, 17 g, Oral, BID    Infusions  lactated ringers, 9 mL/hr, Last Rate: 9 mL/hr (12/13/23 1157)  lactated ringers, 100 mL/hr, Last Rate: 100 mL/hr (12/14/23 2656)    Diet  Diet: Cardiac Diets, Diabetic Diets; Healthy Heart (2-3 Na+); Consistent Carbohydrate; Texture: Regular Texture (IDDSI 7); Fluid Consistency: Thin (IDDSI 0)    Assessment/Plan     Active Hospital Problems    Diagnosis  POA    **Osteoarthritis of right hip [M16.11]  Unknown    HTN (hypertension) [I10]  Unknown    Anemia [D64.9]  Unknown    BPH (benign prostatic hyperplasia) [N40.0]  Yes    Diabetes mellitus [E11.9]  Yes      Resolved Hospital Problems   No resolved problems to display.       78 y.o. male with Osteoarthritis of right hip.    Osteoarthritis of the right hip s/p right hip arthroplasty  -POD #1 from right hip arthroplasty  -Pain control and bowel regimen  -PT/OT consulted  -Management per primary    Anemia  -Hgb 8.9 today, baseline seems to be around 11  -Likely secondary to operative blood loss  -Orthopedic surgery planning to transfuse 1 unit PRBC  -Monitor with daily CBC while admitted    Type 2 diabetes mellitus  -Home medications: Glipizide  -Hgb A1c checked last month was 7.2  -Hold glipizide while here, continue SSI  -Blood glucose is on the higher side, likely secondary to stress after surgery  -Can restart home regimen at discharge    Hypertension  -Normotensive at this time on no medications  -On Lasix at home, currently being held  -Monitor BP and titrate medications as needed    SCDs for DVT  prophylaxis.  ASA 81 mg twice daily per Ortho.  Full code.  Discussed with patient.  Anticipate discharge home in 1-2 days.      Jennifer Mayberry MD  [facility]  12/14/23  14:23 EST

## 2023-12-15 ENCOUNTER — READMISSION MANAGEMENT (OUTPATIENT)
Dept: CALL CENTER | Facility: HOSPITAL | Age: 78
End: 2023-12-15
Payer: OTHER GOVERNMENT

## 2023-12-15 LAB
BH BB BLOOD EXPIRATION DATE: NORMAL
BH BB BLOOD TYPE BARCODE: 6200
BH BB DISPENSE STATUS: NORMAL
BH BB PRODUCT CODE: NORMAL
BH BB UNIT NUMBER: NORMAL
CROSSMATCH INTERPRETATION: NORMAL
UNIT  ABO: NORMAL
UNIT  RH: NORMAL

## 2023-12-15 NOTE — DISCHARGE SUMMARY
Patient Name: Jermaine Denis  Patient YOB: 1945    Date of Admission:  12/13/2023  Date of Discharge:  12/15/2023  Discharge Diagnosis: HI ARTHRP ACETBLR/PROX FEM PROSTC AGRFT/ALGRFT [43385] (Right posterior total hip arthroplasty)  Presenting Problem/History of Present Illness: Primary osteoarthritis of right hip [M16.11]  Osteoarthritis of right hip [M16.11]  Admitting Physician: Dr Renny Iqbal  Consults:   Consults       No orders found from 11/14/2023 to 12/14/2023.            DETAILS OF HOSPITAL STAY:  Patient is a 78 y.o. male was admitted to the floor following the above procedure and underwent an uncomplicated hospital stay.  Patient did well with physical therapy and was ambulating well without problems.  On the day of discharge the wound was clean, dry and intact and calf was soft and nontender and Homans sign was negative.  Patient was tolerating  without problems.  Patient will be discharged home.    Condition on Discharge:  Stable    Vital Signs  Temp:  [97.4 °F (36.3 °C)] 97.4 °F (36.3 °C)  Heart Rate:  [63] 63  Resp:  [16] 16  BP: (106)/(59) 106/59    LABS:   Admission on 12/13/2023, Discharged on 12/14/2023   Component Date Value Ref Range Status    Hemoglobin A1C 11/27/2023 7.20 (H)  4.80 - 5.60 % Final    ABO Type 11/27/2023 A   Final    RH type 11/27/2023 Positive   Final    Antibody Screen 11/27/2023 Negative   Final    T&S Expiration Date 11/27/2023 11/30/2023 11:59:59 PM   Final    Hemoglobin 12/13/2023 10.2 (L)  13.0 - 17.7 g/dL Final    Hematocrit 12/13/2023 33.3 (L)  37.5 - 51.0 % Final    Glucose 12/13/2023 208 (H)  70 - 130 mg/dL Final    Glucose 12/13/2023 242 (H)  70 - 130 mg/dL Final    Glucose 12/13/2023 343 (H)  70 - 130 mg/dL Final    Hemoglobin 12/14/2023 8.9 (L)  13.0 - 17.7 g/dL Final    Hematocrit 12/14/2023 28.5 (L)  37.5 - 51.0 % Final    Glucose 12/13/2023 321 (H)  70 - 130 mg/dL Final    Glucose 12/14/2023 237 (H)  70 - 130 mg/dL Final    Product Code  12/15/2023 [id number]   Final    Unit Number 12/15/2023 [id number]   Final    UNIT  ABO 12/15/2023 A   Final    UNIT  RH 12/15/2023 POS   Final    Crossmatch Interpretation 12/15/2023 Compatible   Final    Dispense Status 12/15/2023 PT   Final    Blood Expiration Date 12/15/2023 [id number]   Final    Blood Type Barcode 12/15/2023 6200   Final    ABO Type 12/14/2023 A   Final    RH type 12/14/2023 Positive   Final    Antibody Screen 12/14/2023 Negative   Final    T&S Expiration Date 12/14/2023 12/17/2023 11:59:59 PM   Final    Glucose 12/14/2023 232 (H)  70 - 130 mg/dL Final       No results found.        Discharge Medications     Discharge Medications        New Medications        Instructions Start Date   docusate sodium 100 MG capsule  Commonly known as: COLACE   100 mg, Oral, 2 Times Daily      HYDROcodone-acetaminophen 7.5-325 MG per tablet  Commonly known as: NORCO  Replaces: HYDROcodone-acetaminophen 5-325 MG per tablet   Take 1-2 tablets by mouth Every 4 (Four) Hours As Needed for Moderate Pain for up to 9 days. May take 2 tablets if needed for severe pain.      meloxicam 15 MG tablet  Commonly known as: MOBIC   15 mg, Oral, Daily      polyethylene glycol 17 GM/SCOOP powder  Commonly known as: MIRALAX   17 g, Oral, 2 Times Daily             Changes to Medications        Instructions Start Date   aspirin 81 MG EC tablet  What changed:   how much to take  how to take this  when to take this  additional instructions   Take 1 tab po twice daily X 2 weeks, then 1 tab po daily             Continue These Medications        Instructions Start Date   acetaminophen 325 MG tablet  Commonly known as: TYLENOL   650 mg, Oral, Every 6 Hours PRN      calcium citrate-vitamin d 315-250 MG-UNIT tablet tablet  Commonly known as: CALCITRATE   2 tablets, Oral, 2 Times Daily      cephalexin 500 MG capsule  Commonly known as: KEFLEX   500 mg, Oral, Every Morning      cetirizine 10 MG tablet  Commonly known as: zyrTEC   10 mg,  Oral, Daily PRN      ferrous sulfate 325 (65 FE) MG tablet   325 mg, Oral, 2 Times Daily      finasteride 5 MG tablet  Commonly known as: PROSCAR   5 mg, Oral, Daily      fish oil 1000 MG capsule capsule   1,000 mg, Oral, 3 Times Daily With Meals, HOLD PRIOR TO SURG      furosemide 20 MG tablet  Commonly known as: LASIX   20 mg, Oral, Every Morning      gabapentin 400 MG capsule  Commonly known as: NEURONTIN   800 mg, Oral, 2 Times Daily      glipizide 5 MG tablet  Commonly known as: GLUCOTROL   5 mg, Oral, Daily      hydrocortisone 2.5 % cream   1 application , Topical, As Needed      ketoconazole 2 % cream  Commonly known as: NIZORAL   1 application , Topical, As Needed      ketoconazole 2 % shampoo  Commonly known as: NIZORAL   1 application , Topical, As Needed      Melatonin 10 MG tablet   10-20 mg, Oral, Nightly PRN      omeprazole 20 MG capsule  Commonly known as: priLOSEC   20 mg, Oral, 2 Times Daily      PROBIOTIC DAILY PO   1 tablet, Oral, Daily      simvastatin 20 MG tablet  Commonly known as: ZOCOR   10 mg, Oral, Nightly      tamsulosin 0.4 MG capsule 24 hr capsule  Commonly known as: FLOMAX   1 capsule, Oral, Nightly      traZODone 50 MG tablet  Commonly known as: DESYREL   50 mg, Oral, Nightly PRN      vitamin B-12 500 MCG tablet  Commonly known as: CYANOCOBALAMIN   500 mcg, Oral, Daily             Stop These Medications      HYDROcodone-acetaminophen 5-325 MG per tablet  Commonly known as: NORCO  Replaced by: HYDROcodone-acetaminophen 7.5-325 MG per tablet     methocarbamol 500 MG tablet  Commonly known as: ROBAXIN     naproxen sodium 220 MG tablet  Commonly known as: ALEVE              Activity at Discharge:     Discharge Instructions:   1)  Patient is to continue with physical therapy exercises daily and continue working with the physical therapist as ordered.  2)  Follow Posterior hip precautions.   3)  Patient may weight bear as tolerated.   4)  Apply ice regularly. You may ice for long periods of  time as long as ice is not directly on the skin. Patient instructed on frequent calf pumping exercises.  Patient also instructed on incentive spirometer during hospitalization and encouraged to continue to use at home regularly.   5)  The dressing should be left in place. If waterproof dressing is intact the patient may shower immediately following discharge. If the dressing becomes disloged or saturated it should be changed. Please refer to the ZACKARY information sheet if you have any questions about the dressing.  If you have a home health nurse or therapist they can be contacted to assist with dressing change or repair. You may also call the [facility] dressing hotline for questions related to the dressing ([phone]). If there are still other problems or questions related to the dressing despite these measures then you can contact Viji at our office [phone].   6)  Follow up appointment in 2 weeks - patient to call the office at [phone] to schedule. 7)  Patient will be discharged on aspirin 81mg BID x 2 weeks, then daily x 4 weeks    Complete Discharge Diagnosis:    Osteoarthritis of right hip    BPH (benign prostatic hyperplasia)    Diabetes mellitus    HTN (hypertension)    Anemia          Follow-up Appointments  Future Appointments   Date Time Provider Department Center   12/28/2023 10:50 AM Renny Iqbal MD [facility] [address] [facility]              Renny Iqbal MD  12/15/23  14:34 EST

## 2023-12-15 NOTE — OUTREACH NOTE
Prep Survey      Flowsheet Row Responses   [facility] facility patient discharged from? [place]   Is LACE score < 7 ? No   Eligibility Readm Mgmt   Discharge diagnosis Osteoarthritis of right hip, right hip arthroplasty   Does the patient have one of the following disease processes/diagnoses(primary or secondary)? Total Joint Replacement   Does the patient have Home health ordered? Yes   What is the Home health agency?  [facility]   Is there a DME ordered? No   Comments regarding appointments Follow up with Linh Mckeon MD (Internal Medicine)   Medication alerts for this patient see avs for all meds   Prep survey completed? Yes            Adriana KHAN - Registered Nurse

## 2023-12-19 ENCOUNTER — READMISSION MANAGEMENT (OUTPATIENT)
Dept: CALL CENTER | Facility: HOSPITAL | Age: 78
End: 2023-12-19
Payer: OTHER GOVERNMENT

## 2023-12-19 NOTE — OUTREACH NOTE
Total Joint Week 1 Survey      Flowsheet Row Responses   [facility] facility patient discharged from? [place]   Does the patient have one of the following disease processes/diagnoses(primary or secondary)? Total Joint Replacement   Joint surgery performed? Hip   Week 1 attempt successful? No   Unsuccessful attempts Attempt 1            Tootie ECHAVARRIA - Registered Nurse

## 2023-12-20 ENCOUNTER — TELEPHONE (OUTPATIENT)
Dept: ORTHOPEDIC SURGERY | Facility: HOSPITAL | Age: 78
End: 2023-12-20
Payer: OTHER GOVERNMENT

## 2023-12-20 NOTE — TELEPHONE ENCOUNTER
Mr. Denis called me back at this time. He said he is doing ok. Things are going glow, but they are going. He is sore. More so than he thought he would be. He is taking the medication and it does help. Though he is stretching it out during the day and mainly taking it with PT and at night. He does have some swelling, but this is normal with is ankle surgery that he has had in the past. He is icing and elevating. Dressing remains CDI. BM's are good. He is icing and elevating. Mr. Denis doesn't have any questions for me at this time. He has my contact information should he need anything.

## 2023-12-20 NOTE — TELEPHONE ENCOUNTER
Attempted to reach Mr. Denis to see how he is doing as he is 1 week SP RTH. Message left at this time.

## 2023-12-22 ENCOUNTER — READMISSION MANAGEMENT (OUTPATIENT)
Dept: CALL CENTER | Facility: HOSPITAL | Age: 78
End: 2023-12-22
Payer: OTHER GOVERNMENT

## 2023-12-22 NOTE — OUTREACH NOTE
Total Joint Week 1 Survey      Flowsheet Row Responses   [facility] patient discharged from? [place]   Does the patient have one of the following disease processes/diagnoses(primary or secondary)? Total Joint Replacement   Joint surgery performed? Hip   Week 1 attempt successful? Yes   Call start time 1638   Call end time 1649   Has the patient been back in either the hospital or Emergency Department since discharge? No   Discharge diagnosis Osteoarthritis of right hip, right hip arthroplasty   Does the patient have all medications related to this admission filled (includes all antibiotics, pain medications, etc.) Yes   Is the patient taking all medications as directed (includes completed medication regime)? Yes   Medication comments pt rates pain currently 5/10   Does the patient have a follow up appointment with their surgeon? Yes   Has the patient kept scheduled appointments due by today? N/A   What is the Home health agency?  [facility]   Has home health visited the patient within 72 hours of discharge? Yes   DME comments pt using walker for ambulation, he reports   Has the patient began therapy sessions (either in the home or as an out patient)? Yes   Does the patient have a wound vac in place? N/A   Has the patient fallen since discharge? No   Comments Pt reports dressing covering the right hip incision, no s/sx of incsion around dressing. Pt reports no issues tolerating po intake or voiding. Pt reports suction as stopped on the ZACKARY dressing.   Did the patient receive a copy of their discharge instructions? Yes   Nursing interventions Reviewed instructions with patient   What is the patient's perception of their functional status since discharge? Improving   Is the patient able to teach back signs and symptoms of infection? Temp >100.4 for 24h or longer, Incisional drainage, Blisters around incision, Changes in mobility, Severe discomfort or pain, Shortness of breath  or chest pain, Increased swelling or redness around incision (not associated with surgical edema)   Is the patient able to teach back how to prevent infection? Check incision daily, Wash hands before and after touching incision, No tub baths, hot tub or swimming   Is the patient able to teach back signs and symptoms of DVT? Redness in calf, Area hot to touch, Shortness of breath or chest pain, Severe pain in calf, Swelling in calf   Is the patient able to teach back home safety measures? Ability to shower   If the patient is a current smoker, are they able to teach back resources for cessation? Not a smoker   Week 1 call completed? Yes   Call end time 1657            Kiara GONZALEZ - Registered Nurse

## 2023-12-28 ENCOUNTER — HOSPITAL ENCOUNTER (OUTPATIENT)
Dept: CARDIOLOGY | Facility: HOSPITAL | Age: 78
Discharge: HOME | End: 2023-12-28
Payer: OTHER GOVERNMENT

## 2023-12-28 ENCOUNTER — OFFICE VISIT (OUTPATIENT)
Dept: ORTHOPEDIC SURGERY | Facility: CLINIC | Age: 78
End: 2023-12-28
Payer: OTHER GOVERNMENT

## 2023-12-28 VITALS — BODY MASS INDEX: 44.25 KG/M2 | HEIGHT: 61 IN | WEIGHT: 234.4 LBS | TEMPERATURE: 98 F

## 2023-12-28 DIAGNOSIS — M79.661 RIGHT CALF PAIN: ICD-10-CM

## 2023-12-28 DIAGNOSIS — Z96.641 STATUS POST TOTAL REPLACEMENT OF RIGHT HIP: Primary | ICD-10-CM

## 2023-12-28 DIAGNOSIS — M79.89 SWELLING OF LOWER EXTREMITY: ICD-10-CM

## 2023-12-28 DIAGNOSIS — R52 PAIN: ICD-10-CM

## 2023-12-28 LAB
BH CV LOWER VASCULAR LEFT COMMON FEMORAL AUGMENT: NORMAL
BH CV LOWER VASCULAR LEFT COMMON FEMORAL COMPETENT: NORMAL
BH CV LOWER VASCULAR LEFT COMMON FEMORAL COMPRESS: NORMAL
BH CV LOWER VASCULAR LEFT COMMON FEMORAL PHASIC: NORMAL
BH CV LOWER VASCULAR LEFT COMMON FEMORAL SPONT: NORMAL
BH CV LOWER VASCULAR RIGHT COMMON FEMORAL AUGMENT: NORMAL
BH CV LOWER VASCULAR RIGHT COMMON FEMORAL COMPETENT: NORMAL
BH CV LOWER VASCULAR RIGHT COMMON FEMORAL COMPRESS: NORMAL
BH CV LOWER VASCULAR RIGHT COMMON FEMORAL PHASIC: NORMAL
BH CV LOWER VASCULAR RIGHT COMMON FEMORAL SPONT: NORMAL
BH CV LOWER VASCULAR RIGHT DISTAL FEMORAL COMPRESS: NORMAL
BH CV LOWER VASCULAR RIGHT GASTRONEMIUS COMPRESS: NORMAL
BH CV LOWER VASCULAR RIGHT GREATER SAPH AK COMPRESS: NORMAL
BH CV LOWER VASCULAR RIGHT GREATER SAPH BK COMPRESS: NORMAL
BH CV LOWER VASCULAR RIGHT LESSER SAPH COMPRESS: NORMAL
BH CV LOWER VASCULAR RIGHT MID FEMORAL AUGMENT: NORMAL
BH CV LOWER VASCULAR RIGHT MID FEMORAL COMPETENT: NORMAL
BH CV LOWER VASCULAR RIGHT MID FEMORAL COMPRESS: NORMAL
BH CV LOWER VASCULAR RIGHT MID FEMORAL PHASIC: NORMAL
BH CV LOWER VASCULAR RIGHT MID FEMORAL SPONT: NORMAL
BH CV LOWER VASCULAR RIGHT PERONEAL COMPRESS: NORMAL
BH CV LOWER VASCULAR RIGHT POPLITEAL AUGMENT: NORMAL
BH CV LOWER VASCULAR RIGHT POPLITEAL COMPETENT: NORMAL
BH CV LOWER VASCULAR RIGHT POPLITEAL COMPRESS: NORMAL
BH CV LOWER VASCULAR RIGHT POPLITEAL PHASIC: NORMAL
BH CV LOWER VASCULAR RIGHT POPLITEAL SPONT: NORMAL
BH CV LOWER VASCULAR RIGHT POSTERIOR TIBIAL COMPRESS: NORMAL
BH CV LOWER VASCULAR RIGHT PROFUNDA FEMORAL COMPRESS: NORMAL
BH CV LOWER VASCULAR RIGHT PROXIMAL FEMORAL COMPRESS: NORMAL
BH CV LOWER VASCULAR RIGHT SAPHENOFEMORAL JUNCTION COMPRESS: NORMAL

## 2023-12-28 PROCEDURE — 93971 EXTREMITY STUDY: CPT

## 2023-12-28 PROCEDURE — 99024 POSTOP FOLLOW-UP VISIT: CPT | Performed by: ORTHOPAEDIC SURGERY

## 2023-12-28 NOTE — PROGRESS NOTES
Jermaine Denis : 1945 MRN: [id number] DATE: 2023    DIAGNOSIS: 2 week follow up right total hip     SUBJECTIVE:Patient returns today for 2 week follow up of right total hip replacement. Patient reports doing well with no unusual complaints. Appears to be progressing appropriately.  Does say he is having some pain and swelling in right lower extremity    OBJECTIVE:   Exam:. The incision is healing appropriately. No sign of infection. Range of motion is progressing as expected. The calf is soft and does have some swelling with some tenderness to palpation with a negative Homans sign.    DIAGNOSTIC STUDIES  Xrays: 2 views of the right hip (AP pelvis and lateral right hip) were ordered and reviewed for evaluation of recent hip replacement. They demonstrate a well positioned, well aligned hip replacement without complicating factors noted. In comparison with previous films there has been interval implant placement.    ASSESSMENT: 2 week status post right hip replacement.    PLAN:     Given pain and swelling right lower extremity we will send him for stat Doppler.  If positive we will call him to discuss options otherwise continue treatment plan as instructed.    1) Dressing removed and steri strips applied   2) PT exercises   3) Continue ice PRN   4) WBAT   5) aspirin 81 mg orally every day for 4 weeks   6) Follow up in 4 weeks with repeat Xrays of right hip (2views)    2-week total hip information packet given and discussed including dental and GI procedure antibiotic prophylaxis.     Renny Iqbal MD  2023

## 2024-01-03 ENCOUNTER — TELEPHONE (OUTPATIENT)
Dept: ORTHOPEDIC SURGERY | Facility: CLINIC | Age: 79
End: 2024-01-03
Payer: OTHER GOVERNMENT

## 2024-01-03 NOTE — TELEPHONE ENCOUNTER
Caller: Jermaine Denis    Relationship: Self    Best call back number: [phone]    What is the best time to reach you: ANY     Who are you requesting to speak with (clinical staff, provider,  specific staff member): CLINICAL     Do you know the name of the person who called: YES     What was the call regarding: PATIENT WOULD LIKE A CALL REGARDING US 12/28/2023

## 2024-01-04 ENCOUNTER — READMISSION MANAGEMENT (OUTPATIENT)
Dept: CALL CENTER | Facility: HOSPITAL | Age: 79
End: 2024-01-04
Payer: OTHER GOVERNMENT

## 2024-01-04 NOTE — TELEPHONE ENCOUNTER
Called and spoke to patient and let Mr. Denis know that his ultrasound was negative for a blood clot and his doppler was normal. Patient understood.

## 2024-01-04 NOTE — OUTREACH NOTE
Total Joint Month 1 Survey      Flowsheet Row Responses   [facility] facility patient discharged from? [place]   Does the patient have one of the following disease processes/diagnoses(primary or secondary)? Total Joint Replacement   Month 1 attempt successful? No   Unsuccessful attempts Attempt 1  [All numbers listed were attempted-no answer]            Jia H - Registered Nurse

## 2024-01-06 ENCOUNTER — HOSPITAL ENCOUNTER (EMERGENCY)
Facility: HOSPITAL | Age: 79
Discharge: HOME | End: 2024-01-06
Attending: EMERGENCY MEDICINE
Payer: OTHER GOVERNMENT

## 2024-01-06 ENCOUNTER — APPOINTMENT (OUTPATIENT)
Dept: GENERAL RADIOLOGY | Facility: HOSPITAL | Age: 79
End: 2024-01-06
Payer: OTHER GOVERNMENT

## 2024-01-06 VITALS
RESPIRATION RATE: 20 BRPM | SYSTOLIC BLOOD PRESSURE: 129 MMHG | OXYGEN SATURATION: 95 % | TEMPERATURE: 102.5 F | DIASTOLIC BLOOD PRESSURE: 67 MMHG | HEART RATE: 74 BPM

## 2024-01-06 DIAGNOSIS — M25.551 RIGHT HIP PAIN: ICD-10-CM

## 2024-01-06 DIAGNOSIS — J11.1 INFLUENZA: Primary | ICD-10-CM

## 2024-01-06 LAB
ALBUMIN SERPL-MCNC: 3.9 G/DL (ref 3.5–5.2)
ALBUMIN/GLOB SERPL: 2 G/DL
ALP SERPL-CCNC: 90 U/L (ref 39–117)
ALT SERPL W P-5'-P-CCNC: 22 U/L (ref 1–41)
ANION GAP SERPL CALCULATED.3IONS-SCNC: 10 MMOL/L (ref 5–15)
AST SERPL-CCNC: 22 U/L (ref 1–40)
B PARAPERT DNA SPEC QL NAA+PROBE: NOT DETECTED
B PERT DNA SPEC QL NAA+PROBE: NOT DETECTED
BACTERIA UR QL AUTO: ABNORMAL /HPF
BASOPHILS # BLD AUTO: 0.02 10*3/MM3 (ref 0–0.2)
BASOPHILS NFR BLD AUTO: 0.3 % (ref 0–1.5)
BILIRUB SERPL-MCNC: 0.5 MG/DL (ref 0–1.2)
BILIRUB UR QL STRIP: NEGATIVE
BUN SERPL-MCNC: 17 MG/DL (ref 8–23)
BUN/CREAT SERPL: 14.7 (ref 7–25)
C PNEUM DNA NPH QL NAA+NON-PROBE: NOT DETECTED
CALCIUM SPEC-SCNC: 9.4 MG/DL (ref 8.6–10.5)
CHLORIDE SERPL-SCNC: 101 MMOL/L (ref 98–107)
CLARITY UR: CLEAR
CO2 SERPL-SCNC: 26 MMOL/L (ref 22–29)
COLOR UR: ABNORMAL
CREAT SERPL-MCNC: 1.16 MG/DL (ref 0.76–1.27)
D-LACTATE SERPL-SCNC: 1.3 MMOL/L (ref 0.5–2)
DEPRECATED RDW RBC AUTO: 48 FL (ref 37–54)
EGFRCR SERPLBLD CKD-EPI 2021: 64.9 ML/MIN/1.73
EOSINOPHIL # BLD AUTO: 0.13 10*3/MM3 (ref 0–0.4)
EOSINOPHIL NFR BLD AUTO: 1.8 % (ref 0.3–6.2)
ERYTHROCYTE [DISTWIDTH] IN BLOOD BY AUTOMATED COUNT: 15.1 % (ref 12.3–15.4)
FLUAV H1 2009 PAND RNA NPH QL NAA+PROBE: DETECTED
FLUBV RNA ISLT QL NAA+PROBE: NOT DETECTED
GLOBULIN UR ELPH-MCNC: 2 GM/DL
GLUCOSE SERPL-MCNC: 206 MG/DL (ref 65–99)
GLUCOSE UR STRIP-MCNC: NEGATIVE MG/DL
HADV DNA SPEC NAA+PROBE: NOT DETECTED
HCOV 229E RNA SPEC QL NAA+PROBE: NOT DETECTED
HCOV HKU1 RNA SPEC QL NAA+PROBE: NOT DETECTED
HCOV NL63 RNA SPEC QL NAA+PROBE: NOT DETECTED
HCOV OC43 RNA SPEC QL NAA+PROBE: NOT DETECTED
HCT VFR BLD AUTO: 28.8 % (ref 37.5–51)
HGB BLD-MCNC: 8.8 G/DL (ref 13–17.7)
HGB UR QL STRIP.AUTO: NEGATIVE
HMPV RNA NPH QL NAA+NON-PROBE: NOT DETECTED
HPIV1 RNA ISLT QL NAA+PROBE: NOT DETECTED
HPIV2 RNA SPEC QL NAA+PROBE: NOT DETECTED
HPIV3 RNA NPH QL NAA+PROBE: NOT DETECTED
HPIV4 P GENE NPH QL NAA+PROBE: NOT DETECTED
HYALINE CASTS UR QL AUTO: ABNORMAL /LPF
IMM GRANULOCYTES # BLD AUTO: 0.03 10*3/MM3 (ref 0–0.05)
IMM GRANULOCYTES NFR BLD AUTO: 0.4 % (ref 0–0.5)
KETONES UR QL STRIP: ABNORMAL
LEUKOCYTE ESTERASE UR QL STRIP.AUTO: ABNORMAL
LYMPHOCYTES # BLD AUTO: 0.46 10*3/MM3 (ref 0.7–3.1)
LYMPHOCYTES NFR BLD AUTO: 6.3 % (ref 19.6–45.3)
M PNEUMO IGG SER IA-ACNC: NOT DETECTED
MCH RBC QN AUTO: 27 PG (ref 26.6–33)
MCHC RBC AUTO-ENTMCNC: 30.6 G/DL (ref 31.5–35.7)
MCV RBC AUTO: 88.3 FL (ref 79–97)
MONOCYTES # BLD AUTO: 0.46 10*3/MM3 (ref 0.1–0.9)
MONOCYTES NFR BLD AUTO: 6.3 % (ref 5–12)
NEUTROPHILS NFR BLD AUTO: 6.25 10*3/MM3 (ref 1.7–7)
NEUTROPHILS NFR BLD AUTO: 84.9 % (ref 42.7–76)
NITRITE UR QL STRIP: NEGATIVE
NRBC BLD AUTO-RTO: 0 /100 WBC (ref 0–0.2)
PH UR STRIP.AUTO: 5.5 [PH] (ref 5–8)
PLATELET # BLD AUTO: 227 10*3/MM3 (ref 140–450)
PMV BLD AUTO: 10.7 FL (ref 6–12)
POTASSIUM SERPL-SCNC: 4.1 MMOL/L (ref 3.5–5.2)
PROT SERPL-MCNC: 5.9 G/DL (ref 6–8.5)
PROT UR QL STRIP: ABNORMAL
RBC # BLD AUTO: 3.26 10*6/MM3 (ref 4.14–5.8)
RBC # UR STRIP: ABNORMAL /HPF
REF LAB TEST METHOD: ABNORMAL
RHINOVIRUS RNA SPEC NAA+PROBE: NOT DETECTED
RSV RNA NPH QL NAA+NON-PROBE: NOT DETECTED
SARS-COV-2 RNA NPH QL NAA+NON-PROBE: NOT DETECTED
SODIUM SERPL-SCNC: 137 MMOL/L (ref 136–145)
SP GR UR STRIP: 1.02 (ref 1–1.03)
SQUAMOUS #/AREA URNS HPF: ABNORMAL /HPF
UROBILINOGEN UR QL STRIP: ABNORMAL
WBC # UR STRIP: ABNORMAL /HPF
WBC NRBC COR # BLD AUTO: 7.35 10*3/MM3 (ref 3.4–10.8)

## 2024-01-06 PROCEDURE — 71045 X-RAY EXAM CHEST 1 VIEW: CPT

## 2024-01-06 PROCEDURE — 99283 EMERGENCY DEPT VISIT LOW MDM: CPT

## 2024-01-06 PROCEDURE — 80053 COMPREHEN METABOLIC PANEL: CPT | Performed by: PHYSICIAN ASSISTANT

## 2024-01-06 PROCEDURE — 81001 URINALYSIS AUTO W/SCOPE: CPT | Performed by: PHYSICIAN ASSISTANT

## 2024-01-06 PROCEDURE — 85025 COMPLETE CBC W/AUTO DIFF WBC: CPT | Performed by: PHYSICIAN ASSISTANT

## 2024-01-06 PROCEDURE — 36415 COLL VENOUS BLD VENIPUNCTURE: CPT | Performed by: PHYSICIAN ASSISTANT

## 2024-01-06 PROCEDURE — 83605 ASSAY OF LACTIC ACID: CPT | Performed by: PHYSICIAN ASSISTANT

## 2024-01-06 PROCEDURE — 87040 BLOOD CULTURE FOR BACTERIA: CPT | Performed by: PHYSICIAN ASSISTANT

## 2024-01-06 PROCEDURE — 0202U NFCT DS 22 TRGT SARS-COV-2: CPT | Performed by: PHYSICIAN ASSISTANT

## 2024-01-06 RX ORDER — CEPHALEXIN 500 MG/1
500 CAPSULE ORAL 3 TIMES DAILY
Qty: 21 CAPSULE | Refills: 0 | Status: SHIPPED | OUTPATIENT
Start: 2024-01-06

## 2024-01-06 RX ORDER — CEPHALEXIN 500 MG/1
500 CAPSULE ORAL ONCE
Status: COMPLETED | OUTPATIENT
Start: 2024-01-06 | End: 2024-01-06

## 2024-01-06 RX ORDER — ACETAMINOPHEN 500 MG
1000 TABLET ORAL ONCE
Status: COMPLETED | OUTPATIENT
Start: 2024-01-06 | End: 2024-01-06

## 2024-01-06 RX ORDER — SODIUM CHLORIDE 0.9 % (FLUSH) 0.9 %
10 SYRINGE (ML) INJECTION AS NEEDED
Status: DISCONTINUED | OUTPATIENT
Start: 2024-01-06 | End: 2024-01-06 | Stop reason: HOSPADM

## 2024-01-06 RX ADMIN — ACETAMINOPHEN 1000 MG: 500 TABLET ORAL at 14:25

## 2024-01-06 RX ADMIN — CEPHALEXIN 500 MG: 500 CAPSULE ORAL at 16:53

## 2024-01-06 NOTE — ED NOTES
Pt to [facility] from urgent care. Pt had hip replacement in December. States incision site is red and warm. Pt febrile in triage. Ambulatory with walker.

## 2024-01-06 NOTE — ED PROVIDER NOTES
MD ATTESTATION NOTE    The FEDERICO and I have discussed this patient's history, physical exam, and treatment plan.  I have reviewed the documentation and personally had a face to face interaction with the patient. I affirm the documentation and agree with the treatment and plan.  The attached note describes my personal findings.      I provided a substantive portion of the care of the patient.  I personally performed the physical exam in its entirety, and below are my findings.      Brief HPI: Patient complains of fever and chills for the past 2 days.  He reports nonproductive cough.  He had a right hip replacement about 3 weeks ago.  He noticed some mild redness around his incision earlier today.  Denies worsening hip pain, chest pain, shortness of breath, abdominal pain, vomiting, or diarrhea.    PHYSICAL EXAM  ED Triage Vitals   Temp Heart Rate Resp BP SpO2   01/06/24 1338 01/06/24 1338 01/06/24 1338 01/06/24 1343 01/06/24 1338   (!) 102.5 °F (39.2 °C) 108 20 145/64 93 %      Temp src Heart Rate Source Patient Position BP Location FiO2 (%)   -- -- 01/06/24 1344 01/06/24 1344 --     Lying Right arm          GENERAL: Awake, alert, oriented x 3.  Nontoxic-appearing elderly male.  No acute distress  HENT: nares patent  EYES: no scleral icterus  CV: regular rhythm, normal rate  RESPIRATORY: normal effort, clear to auscultation bilaterally  ABDOMEN: soft, nontender  MUSCULOSKELETAL: There is a healed incision over the right hip.  There is minimal surrounding erythema.  No fluctuance.  No drainage.  NEURO: alert, moves all extremities, follows commands  PSYCH:  calm, cooperative  SKIN: warm, dry    Vital signs and nursing notes reviewed.        Plan: Obtain labs and chest x-ray.    Chest x-ray personally interpreted by me.  My personal interpretation is: Heart size is normal.  Lungs are clear.  No pleural effusion.    MDM: Respiratory panel was positive for influenza A.  He does not have a UTI.  Lactic acid and white blood  cell count are normal.  Hemoglobin is low but stable.  Case was discussed with orthopedics.  Fever is most likely due to influenza.  Have low suspicion of a septic joint or postoperative infection.  Patient will be discharged on antibiotics    ED Course as of 01/06/24 1742   Sat Jan 06, 2024   1406 Patient presents with 2-day history of fever, mild erythema to the right lateral hip incision.  Differential diagnoses include but not limited to septic joint, pneumonia, viral URI. [EE]   1423 WBC: 7.35 [EE]   1423 Hemoglobin(!): 8.8 [EE]   1511 Glucose(!): 206 [EE]   1511 Lactate: 1.3 [EE]   1515 Chest x-ray independently interpreted myself shows no evidence of acute infiltrate. [EE]   1515 Influenza A H1 2009 PCR(!): Detected [EE]   1547 He did test positive for the flu.  He does have a little collection of fluid and erythema which patient reports is new along the incision.  I will discuss with orthopedics.  He [EE]   1636 I discussed the patient with Dr. Pedro.  I explained to him the presentation.  The erythema is not overly impressive.  He has no white count.  I believe the flu likely explains all of the fever.  To be in the safe side he would like us to start him on antibiotics and have him follow-up this week. [EE]   1641 I updated the patient on these findings.  In retrospect the patient states that when he gets up out of a chair or out of his bed he frequently turns and rubs this area of the incision on the bed.  He suspects this may be more irritation.  I do not believe there is any evidence of septic joint.  They are in agreement with treatment plan. [EE]      ED Course User Index  [EE] Indio Hong PA Holland, William D, MD  01/06/24 1742

## 2024-01-06 NOTE — ED PROVIDER NOTES
EMERGENCY DEPARTMENT ENCOUNTER    Room Number:  02/02  Date of encounter:  1/6/2024  PCP: Linh Mckeon MD  Historian: Patient, spouse  Chronic or social conditions impacting care (social determinants of health): None    HPI:  Chief Complaint: Fever  A complete HPI/ROS/PMH/PSH/SH/FH are unobtainable due to: Nothing    Context: Jermaine Denis is a 78 y.o. male who presents to the ED c/o 2-day history of fever.  Patient is approximately 3 weeks postop right hip replacement.  He states the hip for the most part has been healing well.  Over the past several days he has had more of a cough and noticed a fever starting yesterday.  He has a temperature here of 102.5.  He states his hip may be hurting him a little bit more than usual.  He initially went to the immediate care center for his fever and they found his right hip incision to be red and swollen.    Patient states that he is on suppressive Keflex from her previous left knee infection.    Review of prior external notes (non-ED):   I reviewed admission from 12/13/2023.  Patient had right hip arthroplasty.  Also reviewed postoperative visit from 12/20/2023.  Patient was convalescing well at that time without complications.    Review of prior external test results outside of this encounter:  I reviewed a BMP from 11/27/2023.  Creatinine 0.98, potassium 4.4    PAST MEDICAL HISTORY  Active Ambulatory Problems     Diagnosis Date Noted    Rotator cuff tear arthropathy, right 01/03/2018    Rotator cuff arthropathy 01/18/2018    Left rotator cuff tear arthropathy 06/01/2018    Osteoarthritis of left shoulder 06/28/2018    Cancer 03/07/2022    Encounter for screening colonoscopy 03/07/2022    History of colon polyps 03/07/2022    Lumbar radiculopathy 01/27/2023    Synovial cyst of lumbar facet joint 01/27/2023    Spondylolisthesis, lumbar region 01/27/2023    History of hepatitis C     BPH (benign prostatic hyperplasia)     Diabetes mellitus     Neuropathy     Pain of right  hip 09/15/2023    Osteoarthritis of right hip 11/03/2023    HTN (hypertension) 12/14/2023    Anemia 12/14/2023     Resolved Ambulatory Problems     Diagnosis Date Noted    No Resolved Ambulatory Problems     Past Medical History:   Diagnosis Date    Arthritis     GERD (gastroesophageal reflux disease)     H/O gastric bypass     High cholesterol     History of bladder cancer     History of COVID-19     History of infection     History of kidney stones     History of renal cell cancer     History of transfusion     Hypertension     Low back pain     Neck pain     Nerve damage     Psoriasis     Right hip pain     Right leg pain     Sleep apnea     Urethral stricture     UTI (urinary tract infection)          PAST SURGICAL HISTORY  Past Surgical History:   Procedure Laterality Date    ANKLE SURGERY Right     orif with hardware    CARPAL TUNNEL RELEASE Left     CATARACT EXTRACTION WITH INTRAOCULAR LENS IMPLANT Bilateral     COLONOSCOPY      COLONOSCOPY N/A 05/12/2022    Procedure: COLONOSCOPY FOR SCREENING;  Surgeon: Mik Joaquin MD;  Location: [facility] ENDOSCOPY;  Service: Gastroenterology;  Laterality: N/A;  DIVERTICULOSIS, hemorrhoids    CYSTOSCOPY N/A 02/27/2023    Procedure: CYSTOSCOPY FLEXIBLE, insertion of jean baptiste catheter with urethral dilation;  Surgeon: Jermain Duran MD;  Location: [facility];  Service: Urology;  Laterality: N/A;    ENDOSCOPY      GASTRIC BYPASS      2008    HAND SURGERY Right     thumb     KNEE ARTHROPLASTY Left     LUMBAR FUSION N/A 02/27/2023    Procedure: LUMBAR THREE TO FOUR, FOUR TO FIVE TRANSFORAMINAL LUMBAR INTERBODY FUSION WITH LUMBAR THREE TO FOUR, FOUR TO FIVE, FIVE TO SACRAL ONE, SACRAL ONE TO TWO INSTRUMENTATION WITH ROBOTIC ASSISTANCE;  Surgeon: Indio Blas MD;  Location: [facility];  Service: Robotics - Neuro;  Laterality: N/A;    NEPHRECTOMY PARTIAL      ORIF WRIST FRACTURE Left     TOTAL HIP ARTHROPLASTY Right 12/13/2023    Procedure: Right posterior total  hip arthroplasty;  Surgeon: Renny Iqbal MD;  Location: [facility];  Service: Orthopedics;  Laterality: Right;    TOTAL SHOULDER ARTHROPLASTY W/ DISTAL CLAVICLE EXCISION Right 2018    Procedure: Reverse Total Shoulder Arthroplasty;  Surgeon: Alex Ordaz MD;  Location: [facility] OR;  Service:     TOTAL SHOULDER ARTHROPLASTY W/ DISTAL CLAVICLE EXCISION Left 2018    Procedure: TOTAL SHOULDER REVERSE ARTHROPLASTY;  Surgeon: Alex Ordaz MD;  Location: [facility] OR;  Service: Orthopedics         FAMILY HISTORY  Family History   Problem Relation Age of Onset    Malig Hyperthermia Neg Hx     Colon cancer Neg Hx          SOCIAL HISTORY  Social History     Socioeconomic History    Marital status:    Tobacco Use    Smoking status: Former     Packs/day: 2.00     Years: 15.00     Additional pack years: 0.00     Total pack years: 30.00     Types: Cigarettes     Quit date:      Years since quittin.0    Smokeless tobacco: Never   Vaping Use    Vaping Use: Never used   Substance and Sexual Activity    Alcohol use: Yes     Comment: bourbon/beer on occasion    Drug use: No    Sexual activity: Defer         ALLERGIES  Sulfa antibiotics        REVIEW OF SYSTEMS  All systems reviewed and negative except for those discussed in HPI.       PHYSICAL EXAM    I have reviewed the triage vital signs and nursing notes.    ED Triage Vitals   Temp Heart Rate Resp BP SpO2   24 1338 24 1338 24 1338 24 1343 24 1338   (!) 102.5 °F (39.2 °C) 108 20 145/64 93 %      Temp src Heart Rate Source Patient Position BP Location FiO2 (%)   -- -- 24 1344 24 1344 --     Lying Right arm        Physical Exam  GENERAL: Alert, oriented, nontoxic-appearing, not distressed  HENT: head atraumatic, no nuchal rigidity  EYES: no scleral icterus, EOMI  CV: regular rhythm, regular rate, no murmur  RESPIRATORY: normal effort, CTA  ABDOMEN: soft, nontender  MUSCULOSKELETAL: Well-healed  right lateral surgical incision without evidence of dehiscence.  There is mild erythema and induration to the most inferior portion of the incision.  No discharge.  Fairly painless range of motion of right hip.  NEURO: alert, moves all extremities, follows commands  SKIN: warm, dry        LAB RESULTS  Recent Results (from the past 24 hour(s))   Comprehensive Metabolic Panel    Collection Time: 01/06/24  2:03 PM    Specimen: Blood   Result Value Ref Range    Glucose 206 (H) 65 - 99 mg/dL    BUN 17 8 - 23 mg/dL    Creatinine 1.16 0.76 - 1.27 mg/dL    Sodium 137 136 - 145 mmol/L    Potassium 4.1 3.5 - 5.2 mmol/L    Chloride 101 98 - 107 mmol/L    CO2 26.0 22.0 - 29.0 mmol/L    Calcium 9.4 8.6 - 10.5 mg/dL    Total Protein 5.9 (L) 6.0 - 8.5 g/dL    Albumin 3.9 3.5 - 5.2 g/dL    ALT (SGPT) 22 1 - 41 U/L    AST (SGOT) 22 1 - 40 U/L    Alkaline Phosphatase 90 39 - 117 U/L    Total Bilirubin 0.5 0.0 - 1.2 mg/dL    Globulin 2.0 gm/dL    A/G Ratio 2.0 g/dL    BUN/Creatinine Ratio 14.7 7.0 - 25.0    Anion Gap 10.0 5.0 - 15.0 mmol/L    eGFR 64.9 >60.0 mL/min/1.73   Lactic Acid, Plasma    Collection Time: 01/06/24  2:03 PM    Specimen: Blood   Result Value Ref Range    Lactate 1.3 0.5 - 2.0 mmol/L   CBC Auto Differential    Collection Time: 01/06/24  2:03 PM    Specimen: Blood   Result Value Ref Range    WBC 7.35 3.40 - 10.80 10*3/mm3    RBC 3.26 (L) 4.14 - 5.80 10*6/mm3    Hemoglobin 8.8 (L) 13.0 - 17.7 g/dL    Hematocrit 28.8 (L) 37.5 - 51.0 %    MCV 88.3 79.0 - 97.0 fL    MCH 27.0 26.6 - 33.0 pg    MCHC 30.6 (L) 31.5 - 35.7 g/dL    RDW 15.1 12.3 - 15.4 %    RDW-SD 48.0 37.0 - 54.0 fl    MPV 10.7 6.0 - 12.0 fL    Platelets 227 140 - 450 10*3/mm3    Neutrophil % 84.9 (H) 42.7 - 76.0 %    Lymphocyte % 6.3 (L) 19.6 - 45.3 %    Monocyte % 6.3 5.0 - 12.0 %    Eosinophil % 1.8 0.3 - 6.2 %    Basophil % 0.3 0.0 - 1.5 %    Immature Grans % 0.4 0.0 - 0.5 %    Neutrophils, Absolute 6.25 1.70 - 7.00 10*3/mm3    Lymphocytes, Absolute 0.46  (L) 0.70 - 3.10 10*3/mm3    Monocytes, Absolute 0.46 0.10 - 0.90 10*3/mm3    Eosinophils, Absolute 0.13 0.00 - 0.40 10*3/mm3    Basophils, Absolute 0.02 0.00 - 0.20 10*3/mm3    Immature Grans, Absolute 0.03 0.00 - 0.05 10*3/mm3    nRBC 0.0 0.0 - 0.2 /100 WBC   Respiratory Panel PCR w/COVID-19(SARS-CoV-2) BELINDA/JANNETTE/KELVIN/PAD/COR/PREETI In-House, NP Swab in UTM/VTM, 2 HR TAT - Swab, Nasopharynx    Collection Time: 01/06/24  2:07 PM    Specimen: Nasopharynx; Swab   Result Value Ref Range    ADENOVIRUS, PCR Not Detected Not Detected    Coronavirus 229E Not Detected Not Detected    Coronavirus HKU1 Not Detected Not Detected    Coronavirus NL63 Not Detected Not Detected    Coronavirus OC43 Not Detected Not Detected    COVID19 Not Detected Not Detected - Ref. Range    Human Metapneumovirus Not Detected Not Detected    Human Rhinovirus/Enterovirus Not Detected Not Detected    Influenza A H1 2009 PCR Detected (A) Not Detected    Influenza B PCR Not Detected Not Detected    Parainfluenza Virus 1 Not Detected Not Detected    Parainfluenza Virus 2 Not Detected Not Detected    Parainfluenza Virus 3 Not Detected Not Detected    Parainfluenza Virus 4 Not Detected Not Detected    RSV, PCR Not Detected Not Detected    Bordetella pertussis pcr Not Detected Not Detected    Bordetella parapertussis PCR Not Detected Not Detected    Chlamydophila pneumoniae PCR Not Detected Not Detected    Mycoplasma pneumo by PCR Not Detected Not Detected   Urinalysis With Microscopic If Indicated (No Culture) - Urine, Clean Catch    Collection Time: 01/06/24  2:57 PM    Specimen: Urine, Clean Catch   Result Value Ref Range    Color, UA Dark Yellow (A) Yellow, Straw    Appearance, UA Clear Clear    pH, UA 5.5 5.0 - 8.0    Specific Gravity, UA 1.022 1.005 - 1.030    Glucose, UA Negative Negative    Ketones, UA Trace (A) Negative    Bilirubin, UA Negative Negative    Blood, UA Negative Negative    Protein, UA 30 mg/dL (1+) (A) Negative    Leuk Esterase, UA Small  (1+) (A) Negative    Nitrite, UA Negative Negative    Urobilinogen, UA 0.2 E.U./dL 0.2 - 1.0 E.U./dL   Urinalysis, Microscopic Only - Urine, Clean Catch    Collection Time: 01/06/24  2:57 PM    Specimen: Urine, Clean Catch   Result Value Ref Range    RBC, UA 0-2 None Seen, 0-2 /HPF    WBC, UA 11-20 (A) None Seen, 0-2 /HPF    Bacteria, UA None Seen None Seen /HPF    Squamous Epithelial Cells, UA 0-2 None Seen, 0-2 /HPF    Hyaline Casts, UA 0-2 None Seen /LPF    Methodology Automated Microscopy        Ordered the above labs and independently reviewed the results.        RADIOLOGY  XR Chest 1 View    Result Date: 1/6/2024  Emergency portable view of the chest on 1/6/2024  CLINICAL HISTORY: Fever for 2 days  This is correlated to a prior chest x-ray on 3/3/2023  FINDINGS: There are bilateral reverse total shoulder arthroplasty prosthesis in place. The cardiomediastinal silhouette and the pulmonary vasculature are within normal limits. The lungs are clear. The costophrenic angles are sharp.      1. No active disease is seen in the chest.  2. Bilateral reverse total shoulder arthroplasty prostheses are in place. There is some sort of rectangular artifact overlying the proximal left humerus slightly limiting evaluation.  This report was finalized on 1/6/2024 2:43 PM by Dr. López Peng M.D on Workstation: [id number]       I ordered the above noted radiological studies. Reviewed by me and discussed with radiologist.  See dictation for official radiology interpretation.      MEDICATIONS GIVEN IN ER    Medications   acetaminophen (TYLENOL) tablet 1,000 mg (1,000 mg Oral Given 1/6/24 1425)   cephalexin (KEFLEX) capsule 500 mg (500 mg Oral Given 1/6/24 8483)         ADDITIONAL ORDERS CONSIDERED BUT NOT ORDERED:  Considered admission however patient has very localized erythema to the incision area.  Patient believes this is from him transferring and rubbing the area.  I believe his influenza test explains his fever.  He has  appropriate follow-up.      PROGRESS, DATA ANALYSIS, CONSULTS, AND MEDICAL DECISION MAKING    All labs have been independently interpreted by myself.  All radiology studies have been independently interpreted by myself and discussed with radiologist dictating the report.   EKG's independently interpreted by myself.  Discussion below represents my analysis of pertinent findings related to patient's condition, differential diagnosis, treatment plan and final disposition.    I have discussed case with Dr. Peace, emergency room physician.  He has performed his own bedside examination and agrees with treatment plan.    ED Course as of 01/06/24 1940   Sat Jan 06, 2024   1406 Patient presents with 2-day history of fever, mild erythema to the right lateral hip incision.  Differential diagnoses include but not limited to septic joint, pneumonia, viral URI. [EE]   1423 WBC: 7.35 [EE]   1423 Hemoglobin(!): 8.8 [EE]   1511 Glucose(!): 206 [EE]   1511 Lactate: 1.3 [EE]   1515 Chest x-ray independently interpreted myself shows no evidence of acute infiltrate. [EE]   1515 Influenza A H1 2009 PCR(!): Detected [EE]   1547 He did test positive for the flu.  He does have a little collection of fluid and erythema which patient reports is new along the incision.  I will discuss with orthopedics.  He [EE]   1636 I discussed the patient with Dr. Pedro.  I explained to him the presentation.  The erythema is not overly impressive.  He has no white count.  I believe the flu likely explains all of the fever.  To be in the safe side he would like us to start him on antibiotics and have him follow-up this week. [EE]   1641 I updated the patient on these findings.  In retrospect the patient states that when he gets up out of a chair or out of his bed he frequently turns and rubs this area of the incision on the bed.  He suspects this may be more irritation.  I do not believe there is any evidence of septic joint.  They are in agreement with  treatment plan. [EE]      ED Course User Index  [EE] Indio Hong PA       AS OF 19:40 EST VITALS:    BP - 129/67  HR - 74  TEMP - (!) 102.5 °F (39.2 °C)  O2 SATS - 95%        DIAGNOSIS  Final diagnoses:   Influenza   Right hip pain         DISPOSITION  Discharged      Dictated utilizing Dragon dictation     Indio Hong PA  01/06/24 4200

## 2024-01-09 ENCOUNTER — READMISSION MANAGEMENT (OUTPATIENT)
Dept: CALL CENTER | Facility: HOSPITAL | Age: 79
End: 2024-01-09
Payer: OTHER GOVERNMENT

## 2024-01-09 NOTE — OUTREACH NOTE
Total Joint Month 1 Survey      Flowsheet Row Responses   [facility] facility patient discharged from? [place]   Does the patient have one of the following disease processes/diagnoses(primary or secondary)? Total Joint Replacement   Joint surgery performed? Hip   Month 1 attempt successful? No   Unsuccessful attempts Attempt 2            Meri CLARK - Registered Nurse

## 2024-01-11 LAB
BACTERIA SPEC AEROBE CULT: NORMAL
BACTERIA SPEC AEROBE CULT: NORMAL

## 2024-01-25 ENCOUNTER — OFFICE VISIT (OUTPATIENT)
Dept: ORTHOPEDIC SURGERY | Facility: CLINIC | Age: 79
End: 2024-01-25
Payer: OTHER GOVERNMENT

## 2024-01-25 VITALS — BODY MASS INDEX: 42.63 KG/M2 | WEIGHT: 225.8 LBS | HEIGHT: 61 IN | TEMPERATURE: 98.6 F

## 2024-01-25 DIAGNOSIS — R52 PAIN: ICD-10-CM

## 2024-01-25 DIAGNOSIS — Z96.641 STATUS POST TOTAL REPLACEMENT OF RIGHT HIP: Primary | ICD-10-CM

## 2024-01-25 PROCEDURE — 99024 POSTOP FOLLOW-UP VISIT: CPT | Performed by: ORTHOPAEDIC SURGERY

## 2024-01-25 NOTE — PROGRESS NOTES
Jermaine Denis : 1945 MRN: [id number] DATE: 2024    DIAGNOSIS: 6 week follow up right total hip     SUBJECTIVE:Patient returns today for 6 week follow up of right total hip replacement. Patient reports doing well with no unusual complaints. Appears to be progressing appropriately and is off a cane    OBJECTIVE:   Exam:. The incision is healed. No sign of infection.  There is 1 area appears to be some of the Vicryl suture may be spitting out there is no overt redness or drainage.  Range of motion is progressing as expected. The calf is soft and nontender with a negative Homans sign. Strength progressing    DIAGNOSTIC STUDIES  Xrays: 2 views of the right  hip (AP pelvis and lateral right hip) were ordered and reviewed for evaluation of recent hip replacement. They demonstrate a well positioned, well aligned hip replacement without complicating factors noted. In comparison with previous films there has been interval implant placement.    ASSESSMENT: 6 week status post right  hip replacement.    PLAN: 1) Activity as tolerated   2) Continue hip strengthening exercises    3) ok to stop DVT ppx    4) follow-up in 2 weeks just for incisional check.  I want to make sure thing is healing and correctly in regards to his wound feel that it would look just fine but because he was seen in emergency department where some redness was reported I want to follow-up on this.  If there is any issues during interim he will let us know..      Antibiotics before dental and GI procedures discussed.     Renny Iqbal MD  2024

## 2024-02-09 ENCOUNTER — OFFICE VISIT (OUTPATIENT)
Dept: ORTHOPEDIC SURGERY | Facility: CLINIC | Age: 79
End: 2024-02-09
Payer: OTHER GOVERNMENT

## 2024-02-09 VITALS — HEIGHT: 61 IN | WEIGHT: 224.8 LBS | BODY MASS INDEX: 42.44 KG/M2 | TEMPERATURE: 98.6 F

## 2024-02-09 DIAGNOSIS — R52 PAIN: ICD-10-CM

## 2024-02-09 DIAGNOSIS — Z96.641 STATUS POST TOTAL REPLACEMENT OF RIGHT HIP: Primary | ICD-10-CM

## 2024-02-09 PROCEDURE — 99024 POSTOP FOLLOW-UP VISIT: CPT | Performed by: ORTHOPAEDIC SURGERY

## 2024-02-09 NOTE — PROGRESS NOTES
Patient: Jermaine Denis  YOB: 1945 78 y.o. male  Medical Record Number: [id number]    Chief Complaint:   Chief Complaint   Patient presents with    Right Hip - Post-op       History of Present Illness:Jermaine Denis is a 78 y.o. male who presents for follow-up of right total hip.  States he has been doing well and not using a cane for the past several weeks.  No complaints.    Allergies:   Allergies   Allergen Reactions    Sulfa Antibiotics Hives       Medications:   Current Outpatient Medications   Medication Sig Dispense Refill    acetaminophen (TYLENOL) 325 MG tablet Take 2 tablets by mouth Every 6 (Six) Hours As Needed for Mild Pain or Fever.      aspirin 81 MG EC tablet Take 1 tab po twice daily X 2 weeks, then 1 tab po daily      calcium citrate-vitamin d (CALCITRATE) 315-250 MG-UNIT tablet tablet Take 2 tablets by mouth 2 (Two) Times a Day.      cephalexin (KEFLEX) 500 MG capsule Take 1 capsule by mouth Every Morning.      cetirizine (zyrTEC) 10 MG tablet Take 1 tablet by mouth Daily As Needed for Allergies.      ferrous sulfate 325 (65 FE) MG tablet Take 1 tablet by mouth 2 (Two) Times a Day.      finasteride (PROSCAR) 5 MG tablet Take 1 tablet by mouth Daily.      furosemide (LASIX) 20 MG tablet Take 1 tablet by mouth Every Morning.      gabapentin (NEURONTIN) 400 MG capsule Take 2 capsules by mouth 2 (Two) Times a Day.      glipizide (GLUCOTROL) 5 MG tablet Take 1 tablet by mouth Daily.      hydrocortisone 2.5 % cream Apply 1 Application topically to the appropriate area as directed As Needed.      ketoconazole (NIZORAL) 2 % cream Apply 1 Application topically to the appropriate area as directed As Needed.      ketoconazole (NIZORAL) 2 % shampoo Apply 1 application  topically to the appropriate area as directed As Needed.      Melatonin 10 MG tablet Take 1-2 tablets by mouth At Night As Needed.      Omega-3 Fatty Acids (FISH OIL) 1000 MG capsule capsule Take 1 capsule by mouth 3 (Three) Times a  "Day With Meals. HOLD PRIOR TO SURG      omeprazole (priLOSEC) 20 MG capsule Take 1 capsule by mouth 2 (Two) Times a Day.      Probiotic Product (PROBIOTIC DAILY PO) Take 1 tablet by mouth Daily.      simvastatin (ZOCOR) 20 MG tablet Take 0.5 tablets by mouth Every Night.      tamsulosin (FLOMAX) 0.4 MG capsule 24 hr capsule Take 1 capsule by mouth Every Night.      traZODone (DESYREL) 50 MG tablet Take 1 tablet by mouth At Night As Needed for Sleep.      vitamin B-12 (CYANOCOBALAMIN) 500 MCG tablet Take 1 tablet by mouth Daily.       No current facility-administered medications for this visit.     Facility-Administered Medications Ordered in Other Visits   Medication Dose Route Frequency Provider Last Rate Last Admin    mupirocin (BACTROBAN) 2 % nasal ointment   Nasal BID Alex Ordaz MD             The following portions of the patient's history were reviewed and updated as appropriate: allergies, current medications, past family history, past medical history, past social history, past surgical history and problem list.    Review of Systems:   A 14 point review of systems was performed. All systems negative except pertinent positives/negative listed in HPI above    Physical Exam:   Vitals:    02/09/24 1026   Temp: 98.6 °F (37 °C)   TempSrc: Temporal   Weight: 102 kg (224 lb 12.8 oz)   Height: 154.9 cm (60.98\")   PainSc: 0-No pain   PainLoc: Hip       General: A and O x 3, ASA, NAD    SCLERA:    Normal    DENTITION:   Normal    Right lower extremity exam incision healing well small area little possible suture spitting out seems to be scabbed over healing and fine there is no redness drainage.  Hip range of motion full and painless.      Radiology:    Xrays 2views (AP bilateral hips and lateral hip) were ordered and reviewed for evaluation of previous total hip replacement demonstrating a well positioned total hip without evidence of wear, loosening, or osteoarthritis  Comparison views: todays xrays were " compared to previous xrays and demonstrate no change    Assessment/Plan:  8-week status post right total hip    Patient doing well.  Continue to progress into his activities and continue home therapy exercises for hip strengthening and function.  No soaking in tub placed other month.  Will follow-up in 6 weeks.  All questions answered.      Renny Iqbal MD  2/9/2024

## 2024-02-21 ENCOUNTER — TELEPHONE (OUTPATIENT)
Dept: NEUROSURGERY | Facility: CLINIC | Age: 79
End: 2024-02-21
Payer: OTHER GOVERNMENT

## 2024-02-21 NOTE — TELEPHONE ENCOUNTER
Caller: Jermaine Denis    Relationship to patient: Self    Best call back number: [phone]    Chief complaint: PT TOOK A FALL 3 WEEKS AGO, HAD SURGERY ABOUT ONE YEAR AGO WITH DR. PARK. PT WANTING TO KNOW IF HE SHOULD HAVE IMAGING DONE PRIOR TO APPT    Type of visit: F.U    Requested date: ASAP     If rescheduling, when is the original appointment: [facility] F/A 3-21-24     Additional notes:MAY NEED NEW [facility] AUTH?      PLEASE CALL PT TO DISCUSS/ADVISE/RESCHEDULE    THANK YOU

## 2024-03-08 ENCOUNTER — TELEPHONE (OUTPATIENT)
Dept: NEUROSURGERY | Facility: CLINIC | Age: 79
End: 2024-03-08
Payer: OTHER GOVERNMENT

## 2024-03-08 NOTE — PROGRESS NOTES
Patient ID: Jermaine Denis is a 79 y.o. male is here today for follow-up after taking a fall about 3 weeks ago.    Subjective     The patient is here in regards to   Chief Complaint   Patient presents with    Back Pain    Follow-up       History of Present Illness  1 month ago Jermaine was reaching for a wrench and he got off his swiveling chair and then attempt to sit back down but his chair had moved and he fell straight down on the floor on his tailbone.  His pain has gradually improved since then but he does have some localized tenderness in his midline tailbone region.  CBD has been helpful for treating this pain.      While in the room and during my examination of the patient I wore a mask and eye protection.  I washed my hands before and after this patient encounter.  The patient was also wearing a mask.    The following portions of the patient's history were reviewed and updated as appropriate: allergies, current medications, past family history, past medical history, past social history, past surgical history and problem list.    Review of Systems   Constitutional:  Negative for fever.   Musculoskeletal:  Positive for back pain, gait problem, myalgias, neck pain and neck stiffness.   Neurological:  Positive for headaches. Negative for dizziness, weakness, light-headedness and numbness.        Past Medical History:   Diagnosis Date    Anemia     CHRONIC    Arthritis     BPH (benign prostatic hyperplasia)     Diabetes mellitus     GERD (gastroesophageal reflux disease)     H/O gastric bypass     High cholesterol     History of bladder cancer     3 YR AGO    History of COVID-19     PT CAN NOT REMEMBER WHEN    History of hepatitis C     treated medically    History of infection     AFTER KNEE SURGERY ON ANTIBIOTICS DAILY    History of kidney stones     History of renal cell cancer     HAD TUMOR REMOVED    History of transfusion     Hypertension     Low back pain     Neck pain     Nerve damage     RIGHT HAND     Neuropathy     FEET    Psoriasis     Right hip pain     Right leg pain     Sleep apnea     NOT CURRENTLY USING MACHINE    Urethral stricture     caths self prn    UTI (urinary tract infection)     freq uti       Allergies   Allergen Reactions    Darunavir Myalgia     PATIENT STATES STIFF JOINTS, COULDN'T MOVE NECK    Sulfa Antibiotics Hives       Family History   Problem Relation Age of Onset    Malig Hyperthermia Neg Hx     Colon cancer Neg Hx        Social History     Socioeconomic History    Marital status:    Tobacco Use    Smoking status: Former     Current packs/day: 0.00     Average packs/day: 2.0 packs/day for 15.0 years (30.0 ttl pk-yrs)     Types: Cigarettes     Start date:      Quit date:      Years since quittin.2    Smokeless tobacco: Never   Vaping Use    Vaping status: Never Used   Substance and Sexual Activity    Alcohol use: Yes     Comment: bourbon/beer on occasion    Drug use: No    Sexual activity: Defer       Past Surgical History:   Procedure Laterality Date    ANKLE SURGERY Right     orif with hardware    CARPAL TUNNEL RELEASE Left     CATARACT EXTRACTION WITH INTRAOCULAR LENS IMPLANT Bilateral     COLONOSCOPY      COLONOSCOPY N/A 2022    Procedure: COLONOSCOPY FOR SCREENING;  Surgeon: Mik Joaquin MD;  Location: [facility] ENDOSCOPY;  Service: Gastroenterology;  Laterality: N/A;  DIVERTICULOSIS, hemorrhoids    CYSTOSCOPY N/A 2023    Procedure: CYSTOSCOPY FLEXIBLE, insertion of jean baptiste catheter with urethral dilation;  Surgeon: Jermain Duran MD;  Location: [facility] OR;  Service: Urology;  Laterality: N/A;    ENDOSCOPY      GASTRIC BYPASS          HAND SURGERY Right     thumb     KNEE ARTHROPLASTY Left     LUMBAR FUSION N/A 2023    Procedure: LUMBAR THREE TO FOUR, FOUR TO FIVE TRANSFORAMINAL LUMBAR INTERBODY FUSION WITH LUMBAR THREE TO FOUR, FOUR TO FIVE, FIVE TO SACRAL ONE, SACRAL ONE TO TWO INSTRUMENTATION WITH ROBOTIC ASSISTANCE;   Surgeon: Indio Blas MD;  Location: [facility] OR;  Service: Robotics - Neuro;  Laterality: N/A;    NEPHRECTOMY PARTIAL      ORIF WRIST FRACTURE Left     TOTAL HIP ARTHROPLASTY Right 12/13/2023    Procedure: Right posterior total hip arthroplasty;  Surgeon: Renny Iqbal MD;  Location: [facility];  Service: Orthopedics;  Laterality: Right;    TOTAL SHOULDER ARTHROPLASTY W/ DISTAL CLAVICLE EXCISION Right 01/18/2018    Procedure: Reverse Total Shoulder Arthroplasty;  Surgeon: Alex Ordaz MD;  Location: [facility] OR;  Service:     TOTAL SHOULDER ARTHROPLASTY W/ DISTAL CLAVICLE EXCISION Left 06/28/2018    Procedure: TOTAL SHOULDER REVERSE ARTHROPLASTY;  Surgeon: Alex Ordaz MD;  Location: [facility] OR;  Service: Orthopedics         Objective     Vitals:    03/11/24 0907   BP: 132/68   Pulse: 87   Resp: 16   SpO2: 97%     Body mass index is 42.51 kg/m².    Physical Exam  Constitutional:       Appearance: Normal appearance.   HENT:      Head: Normocephalic and atraumatic.   Eyes:      Extraocular Movements: Extraocular movements intact.      Conjunctiva/sclera: Conjunctivae normal.      Pupils: Pupils are equal, round, and reactive to light.   Cardiovascular:      Rate and Rhythm: Normal rate and regular rhythm.      Pulses: Normal pulses.   Pulmonary:      Breath sounds: Normal breath sounds.   Abdominal:      Palpations: Abdomen is soft.   Musculoskeletal:         General: Normal range of motion.      Cervical back: Normal range of motion and neck supple.   Skin:     General: Skin is warm and dry.   Neurological:      Mental Status: He is alert and oriented to person, place, and time.      Cranial Nerves: Cranial nerves 2-12 are intact.      Motor: Motor function is intact. No weakness or atrophy.      Coordination: Coordination is intact. Romberg sign negative. Romberg Test normal.      Gait: Gait is intact. Gait normal.      Deep Tendon Reflexes: Reflexes are normal and symmetric.      Reflex  Scores:       Tricep reflexes are 2+ on the right side and 2+ on the left side.       Bicep reflexes are 2+ on the right side and 2+ on the left side.       Brachioradialis reflexes are 2+ on the right side and 2+ on the left side.       Patellar reflexes are 2+ on the right side and 2+ on the left side.       Achilles reflexes are 2+ on the right side and 2+ on the left side.  Psychiatric:         Speech: Speech normal.         Neurologic Exam     Mental Status   Oriented to person, place, and time.   Attention: normal. Concentration: normal.   Speech: speech is normal   Level of consciousness: alert    Cranial Nerves   Cranial nerves II through XII intact.     CN III, IV, VI   Pupils are equal, round, and reactive to light.    Motor Exam   Muscle bulk: normal  Overall muscle tone: normal    Strength   Strength 5/5 except as noted.     Sensory Exam   Light touch normal.     Gait, Coordination, and Reflexes     Gait  Gait: normal    Coordination   Romberg: negative    Reflexes   Reflexes 2+ except as noted.   Right brachioradialis: 2+  Left brachioradialis: 2+  Right biceps: 2+  Left biceps: 2+  Right triceps: 2+  Left triceps: 2+  Right patellar: 2+  Left patellar: 2+  Right achilles: 2+  Left achilles: 2+      Assessment & Plan   Independent Review of Radiographic Studies:      I personally reviewed the images from the following studies.    No new neuroimaging.    Assessment/Plan: Seems to be a localized injury to the tailbone.  I do not think that he needs any further imaging at this time unless it is a persistent issue.  He does not seem to be having any issues with his hip replacement as well.  He is overall doing well aside from this localized tailbone pain.    Medical Decision Making:      Follow-up as needed         Diagnoses and all orders for this visit:    1. Injury of coccyx, initial encounter (Primary)             Patient Instructions/Recommendations:    Call with any questions or concerns      Indio ALDANA  MD Roopa  03/11/24  09:30 EDT

## 2024-03-10 NOTE — DISCHARGE INSTRUCTIONS
Take the following medications the morning of surgery with a small sip of water:    none    General Instructions:  • Do not eat solid food after midnight the night before surgery.  • You may drink clear liquids day of surgery but must stop at least one hour before your hospital arrival time.  • It is beneficial for you to have a clear drink that contains carbohydrates the day of surgery.  We suggest a 12 to 20 ounce bottle of Gatorade or Powerade for non-diabetic patients or a 12 to 20 ounce bottle of G2 or Powerade Zero for diabetic patients. (Pediatric patients, are not advised to drink a 12 to 20 ounce carbohydrate drink)    Clear liquids are liquids you can see through.  Nothing red in color.     Plain water                               Sports drinks  Sodas                                   Gelatin (Jell-O)  Fruit juices without pulp such as white grape juice and apple juice  Popsicles that contain no fruit or yogurt  Tea or coffee (no cream or milk added)  Gatorade / Powerade  G2 / Powerade Zero    • Infants may have breast milk up to four hours before surgery.  • Infants drinking formula may drink formula up to six hours before surgery.   • Patients who avoid smoking, chewing tobacco and alcohol for 4 weeks prior to surgery have a reduced risk of post-operative complications.  Quit smoking as many days before surgery as you can.  • Do not smoke, use chewing tobacco or drink alcohol the day of surgery.   • If applicable bring your C-PAP/ BI-PAP machine.  • Bring any papers given to you in the doctor’s office.  • Wear clean comfortable clothes and socks.  • Do not wear contact lenses or make-up.  Bring a case for your glasses.   • Bring crutches or walker if applicable.  • Remove all piercings.  Leave jewelry and any other valuables at home.  • Hair extensions with metal clips must be removed prior to surgery.  • The Pre-Admission Testing nurse will instruct you to bring medications if unable to obtain an  accurate list in Pre-Admission Testing.        If you were given a blood bank ID arm band remember to bring it with you the day of surgery.    Preventing a Surgical Site Infection:  • For 2 to 3 days before surgery, avoid shaving with a razor because the razor can irritate skin and make it easier to develop an infection.    • Any areas of open skin can increase the risk of a post-operative wound infection by allowing bacteria to enter and travel throughout the body.  Notify your surgeon if you have any skin wounds / rashes even if it is not near the expected surgical site.  The area will need assessed to determine if surgery should be delayed until it is healed.  • The night prior to surgery sleep in a clean bed with clean clothing.  Do not allow pets to sleep with you.  • Shower on the morning of surgery using a fresh bar of anti-bacterial soap (such as Dial) and clean washcloth.  Dry with a clean towel and dress in clean clothing.  • Ask your surgeon if you will be receiving antibiotics prior to surgery.  • Make sure you, your family, and all healthcare providers clean their hands with soap and water or an alcohol based hand  before caring for you or your wound.    Day of surgery:  Upon arrival, a Pre-op nurse and Anesthesiologist will review your health history, obtain vital signs, and answer questions you may have.  The only belongings needed at this time will be your home medications and if applicable your C-PAP/BI-PAP machine.  If you are staying overnight your family can leave the rest of your belongings in the car and bring them to your room later.  A Pre-op nurse will start an IV and you may receive medication in preparation for surgery, including something to help you relax.  Your family will be able to see you in the Pre-op area.  While you are in surgery your family should notify the waiting room  if they leave the waiting room area and provide a contact phone number.    Please be  aware that surgery does come with discomfort.  We want to make every effort to control your discomfort so please discuss any uncontrolled symptoms with your nurse.   Your doctor will most likely have prescribed pain medications.      If you are going home after surgery you will receive individualized written care instructions before being discharged.  A responsible adult must drive you to and from the hospital on the day of your surgery and stay with you for 24 hours.    If you are staying overnight following surgery, you will be transported to your hospital room following the recovery period.  Psych[facility] has all private rooms.    If you have any questions please call Pre-Admission Testing at [phone].  Deductibles and co-payments are collected on the day of service. Please be prepared to pay the required co-pay, deductible or deposit on the day of service as defined by your plan.    2% CHLORAHEXIDINE GLUCONATE* CLOTH  Preparing or “prepping” skin before surgery can reduce the risk of infection at the surgical site. To make the process easier, Psychiatric has chosen disposable cloths moistened with a rinse-free, 2% Chlorhexidine Gluconate (CHG) antiseptic solution. The steps below outline the prepping process and should be carefully followed.        Use the prep cloth on the area that is circled in the diagram             Directions Night before Surgery  1) Shower using a fresh bar of anti-bacterial soap (such as Dial) and clean washcloth.  Use a clean towel to completely dry your skin.  2) Do not use any lotions, oils or creams on your skin.  3) Open the package and remove 1 cloth, wipe your skin for 30 seconds in a circular motion.  Allow to dry for 3 minutes.  4) Repeat #3 with second cloth.  5) Do not touch your eyes, ears, or mouth with the prep cloth.  6) Allow the wet prep solution to air dry.  7) Discard the prep cloth and wash your hands with soap and water.   8) Dress in clean  bed clothes and sleep on fresh clean bed sheets.   9) You may experience some temporary itching after the prep.    Directions Day of Surgery  1) Repeat steps 1,2,3,4,5,6,7, and 9.   2) Dress in clean clothes before coming to the hospital.    BACTROBAN NASAL OINTMENT  There are many germs normally in your nose. Bactroban is an ointment that will help reduce these germs. Please follow these instructions for Bactroban use:       --1-----  The day before surgery in the morning        Date-_6/27/18______       __2__The day before surgery in the evening              Date  6/27/18________    _3___The day of surgery in the morning    Date_6/28/18_______    **Squirt ½ package of Bactroban Ointment onto a cotton applicator and apply to inside of 1st nostril.  Squirt the remaining Bactroban and apply to the inside of the other nostril.    PERIDEX- ORAL:  Use only if your surgeon has ordered  Use the night before and morning of surgery - Swish, gargle, and spit - do not swallow.   1

## 2024-03-11 ENCOUNTER — OFFICE VISIT (OUTPATIENT)
Dept: NEUROSURGERY | Facility: CLINIC | Age: 79
End: 2024-03-11
Payer: OTHER GOVERNMENT

## 2024-03-11 VITALS
WEIGHT: 225 LBS | HEIGHT: 61 IN | SYSTOLIC BLOOD PRESSURE: 132 MMHG | RESPIRATION RATE: 16 BRPM | HEART RATE: 87 BPM | OXYGEN SATURATION: 97 % | BODY MASS INDEX: 42.48 KG/M2 | DIASTOLIC BLOOD PRESSURE: 68 MMHG

## 2024-03-11 DIAGNOSIS — S39.92XA INJURY OF COCCYX, INITIAL ENCOUNTER: Primary | ICD-10-CM

## 2024-03-11 PROCEDURE — 99213 OFFICE O/P EST LOW 20 MIN: CPT | Performed by: NEUROLOGICAL SURGERY

## 2024-03-11 RX ORDER — HYDROCODONE BITARTRATE AND ACETAMINOPHEN 5; 325 MG/1; MG/1
TABLET ORAL
COMMUNITY
Start: 2024-03-07 | End: 2024-04-06

## 2024-03-13 ENCOUNTER — TELEPHONE (OUTPATIENT)
Dept: NEUROSURGERY | Facility: CLINIC | Age: 79
End: 2024-03-13
Payer: OTHER GOVERNMENT

## 2024-03-13 NOTE — TELEPHONE ENCOUNTER
Caller: Jermaine Denis    Relationship to patient: Self    Best call back number: [phone]     Patient is needing:     PATIENT STATES HE FORGOT TO [facility], STATING THAT HE MADE HIS APPT ON 3/11/24.    ASKING FOR THIS TO BE MAILED TO HIM SO THAT HE CAN [facility]WARD TO [facility]    PLEASE ADVISE

## 2024-03-22 ENCOUNTER — OFFICE VISIT (OUTPATIENT)
Dept: ORTHOPEDIC SURGERY | Facility: CLINIC | Age: 79
End: 2024-03-22
Payer: OTHER GOVERNMENT

## 2024-03-22 VITALS — WEIGHT: 225.6 LBS | TEMPERATURE: 96.9 F | HEIGHT: 61 IN | BODY MASS INDEX: 42.59 KG/M2

## 2024-03-22 DIAGNOSIS — R52 PAIN: ICD-10-CM

## 2024-03-22 DIAGNOSIS — Z96.641 STATUS POST TOTAL REPLACEMENT OF RIGHT HIP: Primary | ICD-10-CM

## 2024-03-22 NOTE — PROGRESS NOTES
Jermaine Denis : 1945 MRN: [id number] DATE: 3/22/2024    DIAGNOSIS: Right total hip arthroplasty    SUBJECTIVE:Patient returns today for 3.5 month follow up of right total hip replacement. Patient reports doing well with no unusual complaints. Appears to be progressing appropriately and is off a cane    OBJECTIVE:   Exam:. The incision is healed. No sign of infection. Range of motion is progressing as expected. The calf is soft and nontender with a negative Homans sign. Strength progressing    DIAGNOSTIC STUDIES  Xrays: 2 views of the right hip (AP pelvis and lateral right hip) were ordered and reviewed for evaluation of recent hip replacement. They demonstrate a well positioned, well aligned hip replacement without complicating factors noted. In comparison with previous films there has been interval implant placement.    ASSESSMENT: 3.5 months status post right hip replacement.    PLAN: 1) Activity as tolerated   2) Continue hip strengthening exercises    3) Follow up 1 year post-op with repeat Xrays of right hip (2views AP Pelvis and lateral right hip)    Discussed antibiotic prophylaxis prior to any dental procedures or GI procedures.    Renny Iqbal MD  3/22/2024

## 2024-05-09 ENCOUNTER — OFFICE VISIT (OUTPATIENT)
Dept: NEUROSURGERY | Facility: CLINIC | Age: 79
End: 2024-05-09
Payer: OTHER GOVERNMENT

## 2024-05-09 VITALS
BODY MASS INDEX: 42.18 KG/M2 | HEART RATE: 56 BPM | WEIGHT: 223.4 LBS | SYSTOLIC BLOOD PRESSURE: 128 MMHG | RESPIRATION RATE: 16 BRPM | DIASTOLIC BLOOD PRESSURE: 62 MMHG | HEIGHT: 61 IN

## 2024-05-09 DIAGNOSIS — M54.2 CERVICALGIA: ICD-10-CM

## 2024-05-09 DIAGNOSIS — M54.50 CHRONIC MIDLINE LOW BACK PAIN WITHOUT SCIATICA: Primary | ICD-10-CM

## 2024-05-09 DIAGNOSIS — G89.29 CHRONIC MIDLINE LOW BACK PAIN WITHOUT SCIATICA: Primary | ICD-10-CM

## 2024-05-09 DIAGNOSIS — S39.92XD INJURY OF COCCYX, SUBSEQUENT ENCOUNTER: ICD-10-CM

## 2024-05-09 RX ORDER — FLUTICASONE PROPIONATE 50 MCG
SPRAY, SUSPENSION (ML) NASAL
COMMUNITY
Start: 2024-04-18 | End: 2025-04-19

## 2024-05-09 RX ORDER — LORATADINE 10 MG/1
1 TABLET ORAL DAILY PRN
COMMUNITY
Start: 2024-04-18 | End: 2025-04-19

## 2024-05-09 RX ORDER — CARBOXYMETHYLCELLULOSE SODIUM 5 MG/ML
SOLUTION/ DROPS OPHTHALMIC
COMMUNITY
Start: 2024-04-18 | End: 2025-04-19

## 2024-05-14 ENCOUNTER — TELEPHONE (OUTPATIENT)
Dept: NEUROSURGERY | Facility: CLINIC | Age: 79
End: 2024-05-14
Payer: OTHER GOVERNMENT

## 2024-05-14 NOTE — TELEPHONE ENCOUNTER
Called and let Mr. Denis know that I sent a letter stating he was seen on 05/09/2024 so the [facility] covers his [facility] expenses.

## 2024-05-14 NOTE — TELEPHONE ENCOUNTER
Caller: YENNI ROTEGA    Relationship: SELF    Best call back number: [phone]    What was the call regarding: PATIENT CALLED STATED HE NEEDS A NOTES STATING THAT HE WAS SEEN IN OFFICE ON 05/09/24 BY DR PARK - STATES HE NEEDS THE NOTES FOR [facility] TO C[facility] TRAVEL PAY - [facility] TO [facility]  [phone]     PLEASE CALL PATIENT AND ADVISE  THANK YOU

## 2024-06-03 ENCOUNTER — HOSPITAL ENCOUNTER (OUTPATIENT)
Dept: CT IMAGING | Facility: HOSPITAL | Age: 79
Discharge: HOME | End: 2024-06-03
Payer: OTHER GOVERNMENT

## 2024-06-03 ENCOUNTER — HOSPITAL ENCOUNTER (OUTPATIENT)
Dept: MRI IMAGING | Facility: HOSPITAL | Age: 79
Discharge: HOME | End: 2024-06-03
Payer: OTHER GOVERNMENT

## 2024-06-03 DIAGNOSIS — G89.29 CHRONIC MIDLINE LOW BACK PAIN WITHOUT SCIATICA: ICD-10-CM

## 2024-06-03 DIAGNOSIS — M54.2 CERVICALGIA: ICD-10-CM

## 2024-06-03 DIAGNOSIS — M54.50 CHRONIC MIDLINE LOW BACK PAIN WITHOUT SCIATICA: ICD-10-CM

## 2024-06-03 DIAGNOSIS — S39.92XD INJURY OF COCCYX, SUBSEQUENT ENCOUNTER: ICD-10-CM

## 2024-06-03 PROCEDURE — 72131 CT LUMBAR SPINE W/O DYE: CPT

## 2024-06-03 PROCEDURE — 72148 MRI LUMBAR SPINE W/O DYE: CPT

## 2024-06-03 PROCEDURE — 72141 MRI NECK SPINE W/O DYE: CPT

## 2024-06-11 NOTE — PROGRESS NOTES
Patient ID: Jermaine Denis is a 79 y.o. male is here today for follow-up for chronic low back pain and neck pain.    Imaging: MRI of the lumbar and cervical performed on 06/03/2024; CT of the lumbar spine performed on 06/03/2024    Subjective     The patient is here in regards to   Chief Complaint   Patient presents with    Follow-up       History of Present Illness  Jermaine has some mild back pain is not really bothering him very much but occasionally he does have some stiffness in his back.  His main issue is his right hip seems to have worn out and is giving him a lot of pain and he has been using a cane to ambulate.  He also has some general cervicalgia with soreness without radiculopathy.      While in the room and during my examination of the patient I wore a mask and eye protection.  I washed my hands before and after this patient encounter.  The patient was also wearing a mask.    The following portions of the patient's history were reviewed and updated as appropriate: allergies, current medications, past family history, past medical history, past social history, past surgical history and problem list.    Review of Systems   Constitutional:  Negative for fever.   Musculoskeletal:  Positive for gait problem and neck stiffness. Negative for back pain and neck pain.   Neurological:  Positive for weakness. Negative for numbness.   All other systems reviewed and are negative.       Past Medical History:   Diagnosis Date    Anemia     CHRONIC    Arthritis     BPH (benign prostatic hyperplasia)     Diabetes mellitus     GERD (gastroesophageal reflux disease)     H/O gastric bypass     High cholesterol     History of bladder cancer     3 YR AGO    History of COVID-19     PT CAN NOT REMEMBER WHEN    History of hepatitis C     treated medically    History of infection     AFTER KNEE SURGERY ON ANTIBIOTICS DAILY    History of kidney stones     History of renal cell cancer     HAD TUMOR REMOVED    History of transfusion      Hypertension     Low back pain     Neck pain     Nerve damage     RIGHT HAND    Neuropathy     FEET    Psoriasis     Right hip pain     Right leg pain     Sleep apnea     NOT CURRENTLY USING MACHINE    Urethral stricture     caths self prn    UTI (urinary tract infection)     freq uti       Allergies   Allergen Reactions    Darunavir Myalgia     PATIENT STATES STIFF JOINTS, COULDN'T MOVE NECK    Sulfa Antibiotics Hives       Family History   Problem Relation Age of Onset    Malig Hyperthermia Neg Hx     Colon cancer Neg Hx        Social History     Socioeconomic History    Marital status:    Tobacco Use    Smoking status: Former     Current packs/day: 0.00     Average packs/day: 2.0 packs/day for 15.0 years (30.0 ttl pk-yrs)     Types: Cigarettes     Start date:      Quit date:      Years since quittin.4    Smokeless tobacco: Never   Vaping Use    Vaping status: Never Used   Substance and Sexual Activity    Alcohol use: Yes     Comment: bourbon/beer on occasion    Drug use: No    Sexual activity: Defer       Past Surgical History:   Procedure Laterality Date    ANKLE SURGERY Right     orif with hardware    CARPAL TUNNEL RELEASE Left     CATARACT EXTRACTION WITH INTRAOCULAR LENS IMPLANT Bilateral     COLONOSCOPY      COLONOSCOPY N/A 2022    Procedure: COLONOSCOPY FOR SCREENING;  Surgeon: Mik Joaquin MD;  Location: [facility] ENDOSCOPY;  Service: Gastroenterology;  Laterality: N/A;  DIVERTICULOSIS, hemorrhoids    CYSTOSCOPY N/A 2023    Procedure: CYSTOSCOPY FLEXIBLE, insertion of jean baptiste catheter with urethral dilation;  Surgeon: Jermain Duran MD;  Location: [facility] OR;  Service: Urology;  Laterality: N/A;    ENDOSCOPY      GASTRIC BYPASS      2008    HAND SURGERY Right     thumb     KNEE ARTHROPLASTY Left     LUMBAR FUSION N/A 2023    Procedure: LUMBAR THREE TO FOUR, FOUR TO FIVE TRANSFORAMINAL LUMBAR INTERBODY FUSION WITH LUMBAR THREE TO FOUR, FOUR TO FIVE,  FIVE TO SACRAL ONE, SACRAL ONE TO TWO INSTRUMENTATION WITH ROBOTIC ASSISTANCE;  Surgeon: Indio Blas MD;  Location: [facility] OR;  Service: Robotics - Neuro;  Laterality: N/A;    NEPHRECTOMY PARTIAL      ORIF WRIST FRACTURE Left     TOTAL HIP ARTHROPLASTY Right 12/13/2023    Procedure: Right posterior total hip arthroplasty;  Surgeon: Renny Iqbal MD;  Location: [facility];  Service: Orthopedics;  Laterality: Right;    TOTAL SHOULDER ARTHROPLASTY W/ DISTAL CLAVICLE EXCISION Right 01/18/2018    Procedure: Reverse Total Shoulder Arthroplasty;  Surgeon: Alex Ordaz MD;  Location: [facility] OR;  Service:     TOTAL SHOULDER ARTHROPLASTY W/ DISTAL CLAVICLE EXCISION Left 06/28/2018    Procedure: TOTAL SHOULDER REVERSE ARTHROPLASTY;  Surgeon: Alex Ordaz MD;  Location: [facility] OR;  Service: Orthopedics         Objective     Vitals:    06/12/24 1334   BP: 132/76   Resp: 20     Body mass index is 42.16 kg/m².    Physical Exam  Constitutional:       Appearance: Normal appearance.   HENT:      Head: Normocephalic and atraumatic.   Eyes:      Extraocular Movements: Extraocular movements intact.      Conjunctiva/sclera: Conjunctivae normal.      Pupils: Pupils are equal, round, and reactive to light.   Cardiovascular:      Rate and Rhythm: Normal rate and regular rhythm.      Pulses: Normal pulses.   Pulmonary:      Breath sounds: Normal breath sounds.   Abdominal:      Palpations: Abdomen is soft.   Musculoskeletal:         General: Normal range of motion.      Cervical back: Normal range of motion and neck supple.   Skin:     General: Skin is warm and dry.   Neurological:      Mental Status: He is alert and oriented to person, place, and time.      Cranial Nerves: Cranial nerves 2-12 are intact.      Motor: Motor function is intact. No weakness or atrophy.      Coordination: Coordination is intact. Romberg sign negative. Romberg Test normal.      Gait: Gait is intact. Gait normal.      Deep Tendon  Reflexes: Reflexes are normal and symmetric.      Reflex Scores:       Tricep reflexes are 2+ on the right side and 2+ on the left side.       Bicep reflexes are 2+ on the right side and 2+ on the left side.       Brachioradialis reflexes are 2+ on the right side and 2+ on the left side.       Patellar reflexes are 2+ on the right side and 2+ on the left side.       Achilles reflexes are 2+ on the right side and 2+ on the left side.  Psychiatric:         Speech: Speech normal.         Neurologic Exam     Mental Status   Oriented to person, place, and time.   Attention: normal. Concentration: normal.   Speech: speech is normal   Level of consciousness: alert    Cranial Nerves   Cranial nerves II through XII intact.     CN III, IV, VI   Pupils are equal, round, and reactive to light.    Motor Exam   Muscle bulk: normal  Overall muscle tone: normal    Strength   Strength 5/5 except as noted.     Sensory Exam   Light touch normal.     Gait, Coordination, and Reflexes     Gait  Gait: normal    Coordination   Romberg: negative    Reflexes   Reflexes 2+ except as noted.   Right brachioradialis: 2+  Left brachioradialis: 2+  Right biceps: 2+  Left biceps: 2+  Right triceps: 2+  Left triceps: 2+  Right patellar: 2+  Left patellar: 2+  Right achilles: 2+  Left achilles: 2+      Assessment & Plan   Independent Review of Radiographic Studies:      I personally reviewed the images from the following studies.    CT: CT of the lumbar spine was reviewed and shows solid instrumentation from L3-4, L4-5,.  There is slight worsening of his L2-3 wort retrolisthesis with vacuum disc phenomenon  MR: MRI of the cervical spine wo contrast was reviewed and shows degenerative disc disease at C3-4, C4-5, C5-6 with moderate stenosis and no cord signal change.  Slightly worsened compared to previous MRI.  There is severe neuroforaminal narrowing at these levels  MRI of the lumbar spine wo contrast was reviewed and shows adjacent segment stenosis  at L2-3 resulting in some moderate to severe stenosis at this level.  Modic endplate changes and degenerative disc disease slightly worse than previous 2 MRIs in 2022 and 2023    Assessment/Plan: Has been getting gradually worse retrolisthesis at L2-3.  Partly due to adjacent segment stenosis and likely also his body habitus.  He seems to be minimally affected by this currently as his major complaint is in his right hip and left knee.  He also has baseline cervicalgia but his MRI does not show any significant nerve compression and I recommended conservative management for his cervical spine.      Medical Decision Making:      Physical therapy  Epidural steroid injection         Diagnoses and all orders for this visit:    1. Adjacent segment disease of lumbar spine with history of fusion procedure (Primary)  -     Ambulatory Referral to Physical Therapy  -     Epidural Block    2. Cervicalgia  -     Ambulatory Referral to Physical Therapy             Patient Instructions/Recommendations:    Follow-up in 6 months      Indio Blas MD  06/12/24  14:09 EDT

## 2024-06-12 ENCOUNTER — OFFICE VISIT (OUTPATIENT)
Dept: NEUROSURGERY | Facility: CLINIC | Age: 79
End: 2024-06-12
Payer: OTHER GOVERNMENT

## 2024-06-12 VITALS
WEIGHT: 223 LBS | DIASTOLIC BLOOD PRESSURE: 76 MMHG | HEIGHT: 61 IN | RESPIRATION RATE: 20 BRPM | BODY MASS INDEX: 42.1 KG/M2 | SYSTOLIC BLOOD PRESSURE: 132 MMHG

## 2024-06-12 DIAGNOSIS — M54.2 CERVICALGIA: ICD-10-CM

## 2024-06-12 DIAGNOSIS — Z98.1 ADJACENT SEGMENT DISEASE OF LUMBAR SPINE WITH HISTORY OF FUSION PROCEDURE: Primary | ICD-10-CM

## 2024-06-12 DIAGNOSIS — M51.36 ADJACENT SEGMENT DISEASE OF LUMBAR SPINE WITH HISTORY OF FUSION PROCEDURE: Primary | ICD-10-CM

## 2024-06-12 PROBLEM — M51.369 ADJACENT SEGMENT DISEASE OF LUMBAR SPINE WITH HISTORY OF FUSION PROCEDURE: Status: ACTIVE | Noted: 2023-01-27

## 2024-06-12 PROCEDURE — 99024 POSTOP FOLLOW-UP VISIT: CPT | Performed by: NEUROLOGICAL SURGERY

## 2024-06-13 ENCOUNTER — OFFICE VISIT (OUTPATIENT)
Dept: ORTHOPEDIC SURGERY | Facility: CLINIC | Age: 79
End: 2024-06-13
Payer: OTHER GOVERNMENT

## 2024-06-13 VITALS — HEIGHT: 64 IN | TEMPERATURE: 98.6 F | WEIGHT: 221.5 LBS | BODY MASS INDEX: 37.81 KG/M2

## 2024-06-13 DIAGNOSIS — R52 PAIN: ICD-10-CM

## 2024-06-13 DIAGNOSIS — M70.61 TROCHANTERIC BURSITIS OF RIGHT HIP: Primary | ICD-10-CM

## 2024-06-13 DIAGNOSIS — Z96.641 STATUS POST TOTAL REPLACEMENT OF RIGHT HIP: ICD-10-CM

## 2024-06-13 PROCEDURE — 99213 OFFICE O/P EST LOW 20 MIN: CPT | Performed by: ORTHOPAEDIC SURGERY

## 2024-06-13 NOTE — PROGRESS NOTES
Patient: Jermaine Denis  YOB: 1945 79 y.o. male  Medical Record Number: [id number]    Chief Complaint:   Chief Complaint   Patient presents with    Right Hip - Follow-up, Pain       History of Present Illness:Jermaine Denis is a 79 y.o. male who presents for follow-up of 6-month status post right total hip.  Reports he is doing quite well and last week he felt he stepped funny had some lateral hip thigh and knee pain.  [facility] and told him he had a lot of arthritis in the knee gave him some anti-inflammatory gel to rub on is helping some he overall feels symptoms are better today than they were a week ago.    Allergies:   Allergies   Allergen Reactions    Darunavir Myalgia     PATIENT STATES STIFF JOINTS, COULDN'T MOVE NECK    Sulfa Antibiotics Hives       Medications:   Current Outpatient Medications   Medication Sig Dispense Refill    acetaminophen (TYLENOL) 325 MG tablet Take 2 tablets by mouth Every 6 (Six) Hours As Needed for Mild Pain or Fever.      aspirin 81 MG EC tablet Take 1 tab po twice daily X 2 weeks, then 1 tab po daily      calcium citrate-vitamin d (CALCITRATE) 315-250 MG-UNIT tablet tablet Take 2 tablets by mouth 2 (Two) Times a Day.      carboxymethylcellulose (REFRESH PLUS) 0.5 % solution INSTILL 1 DROP IN EACH EYE FOUR TIMES A DAY FOR DRY EYE      cephalexin (KEFLEX) 500 MG capsule Take 1 capsule by mouth Every Morning.      cetirizine (zyrTEC) 10 MG tablet Take 1 tablet by mouth Daily As Needed for Allergies.      Diclofenac Sodium (VOLTAREN) 1 % gel gel APPLY 4 GM FOR LOWER EXTREMITY TO AFFECTED AREA FOUR TIMES A DAY FOR JOINT PAIN - DO NOT EXCEED 16 GRAMS/DAY FOR LOWER BODY USE, 8 GRAMS/DAY FOR UPPER BODY USE, OR A TOTAL OF 32 GRAMS/DAY FOR ALL APPL      ferrous sulfate 325 (65 FE) MG tablet Take 1 tablet by mouth 2 (Two) Times a Day.      finasteride (PROSCAR) 5 MG tablet Take 1 tablet by mouth Daily.      fluticasone (FLONASE) 50 MCG/ACT nasal  spray USE 2 SPRAYS IN EACH NOSTRIL DAILY FOR ALLERGIES - SHAKE WELL BEFORE USING.      furosemide (LASIX) 20 MG tablet Take 1 tablet by mouth Every Morning.      gabapentin (NEURONTIN) 400 MG capsule Take 2 capsules by mouth 2 (Two) Times a Day.      glipizide (GLUCOTROL) 5 MG tablet Take 1 tablet by mouth Daily.      glucose blood test strip USE 1 STRIP AS DIRECTED TO TEST BLOOD SUGAR THREE TIMES A WEEK      hydrocortisone 2.5 % cream Apply 1 Application topically to the appropriate area as directed As Needed.      ketoconazole (NIZORAL) 2 % cream Apply 1 Application topically to the appropriate area as directed As Needed.      ketoconazole (NIZORAL) 2 % shampoo Apply 1 application  topically to the appropriate area as directed As Needed.      loratadine (CLARITIN) 10 MG tablet Take 1 tablet by mouth Daily As Needed.      Melatonin 10 MG tablet Take 1-2 tablets by mouth At Night As Needed.      Omega-3 Fatty Acids (FISH OIL) 1000 MG capsule capsule Take 1 capsule by mouth 3 (Three) Times a Day With Meals. HOLD PRIOR TO SURG      omeprazole (priLOSEC) 20 MG capsule Take 1 capsule by mouth 2 (Two) Times a Day.      Probiotic Product (PROBIOTIC DAILY PO) Take 1 tablet by mouth Daily.      simvastatin (ZOCOR) 20 MG tablet Take 0.5 tablets by mouth Every Night.      tamsulosin (FLOMAX) 0.4 MG capsule 24 hr capsule Take 1 capsule by mouth Every Night.      traZODone (DESYREL) 50 MG tablet Take 1 tablet by mouth At Night As Needed for Sleep.      vitamin B-12 (CYANOCOBALAMIN) 500 MCG tablet Take 1 tablet by mouth Daily.       No current facility-administered medications for this visit.     Facility-Administered Medications Ordered in Other Visits   Medication Dose Route Frequency Provider Last Rate Last Admin    mupirocin (BACTROBAN) 2 % nasal ointment   Nasal BID Alex Ordaz MD             The following portions of the patient's history were reviewed and updated as appropriate: allergies, current medications, past  "family history, past medical history, past social history, past surgical history and problem list.    Review of Systems:   A 14 point review of systems was performed. All systems negative except pertinent positives/negative listed in HPI above    Physical Exam:   Vitals:    06/13/24 0935   Temp: 98.6 °F (37 °C)   TempSrc: Temporal   Weight: 100 kg (221 lb 8 oz)   Height: 162.6 cm (64\")   PainSc:   8   PainLoc: Hip       General: A and O x 3, ASA, NAD    SCLERA:    Normal    DENTITION:   Normal  Right lower extremity examined very minimal tenderness lateral hip gentle range of motion well-tolerated.  Motor and sensory intact distally.    Radiology:    Xrays 2views (AP bilateral hips and lateral hip) were ordered and reviewed for evaluation of previous total hip replacement demonstrating a well positioned total hip without evidence of wear, loosening, or osteoarthritis  Comparison views: todays xrays were compared to previous xrays and demonstrate no change    Assessment/Plan:  Status post right total hip, right hip bursitis, tendinitis    I discussed the finds the patient his hip appears to be doing well and seems to be more knee related and soft tissue.  Told him to continue his anti-inflammatory gel as well as icing and activity modification.  If needed we can consider therapy here in a few weeks.  His knee still states started up [facility] and if requested he can get referral to see me if needed as well.  Otherwise we will see him back for his hip at the 1 year anniversary.  All questions answered.      Renny Iqbal MD  6/13/2024    "

## 2024-07-12 ENCOUNTER — TELEPHONE (OUTPATIENT)
Dept: NEUROSURGERY | Facility: CLINIC | Age: 79
End: 2024-07-12
Payer: OTHER GOVERNMENT

## 2024-07-12 NOTE — TELEPHONE ENCOUNTER
"Devi from the [facility] called requesting last OVN  \"REQUEST FOR SERVICE FORM\" for cervical spine because patient was seen by Dr. Blas for his lumbar and cervical spine but his auth was only good for the lumbar and not the cervical. Please fax to number listed below    F# [phone]    "

## 2024-07-16 ENCOUNTER — TELEPHONE (OUTPATIENT)
Dept: NEUROSURGERY | Facility: CLINIC | Age: 79
End: 2024-07-16
Payer: OTHER GOVERNMENT

## 2024-07-16 NOTE — TELEPHONE ENCOUNTER
The [facility] received a fax that requires your attention. The document has been indexed to the patient’s chart for your review.      Reason for sending: -DR.CHEN MARTIN NOTE 05/09/24,  [facility] FORM([facility] FROM [facility])     Documents Description: EXT MED RECS:  [facility] FORM/DR.CHEN MARTIN NOTE_[facility]_07/12/24    [facility] of Sender: [facility]    Date Indexed: 07/16/24

## 2024-07-17 ENCOUNTER — TELEPHONE (OUTPATIENT)
Dept: NEUROSURGERY | Facility: CLINIC | Age: 79
End: 2024-07-17
Payer: OTHER GOVERNMENT

## 2024-07-17 NOTE — TELEPHONE ENCOUNTER
Called [facility] spoke to KAMAR Woods in regards to denial letter. Nick was on same call. KAMAR Woods is looking into the denial letter to see what is being denied at this time. KAMAR Woods understands that Dr. Blas had to also look into the issue patient is having with his neck since he just had surgery.

## 2024-07-17 NOTE — TELEPHONE ENCOUNTER
The [facility] received a fax that requires your attention. The document has been indexed to the patient’s chart for your review.    Reason for sending: MEDICAL RECORDS NOTIFICATION    Documents Description: PAIN MANAGEMENT DENIAL_[facility]_07/15/24    Name of Sender: DAVE SESAY    Date Indexed: 07/17/24    Notes (if needed): DENIAL FOR PAIN MANAGEMENT  NOTES STATES:

## 2024-07-17 NOTE — TELEPHONE ENCOUNTER
Called [facility] in [place], spoke with Gregorio . Informed them per [facility] case wen Quinonez, his visit need to be cancelled until new packet is obtained from [facility] for cervical. She cancelled appt.

## 2024-07-17 NOTE — TELEPHONE ENCOUNTER
K. Walton called back to office with update that patient needs to call provider. I spoke to JOHN Mora asking if if we should try to reach out to patient's PMD as well . She approved and I have called PMD and have faxed over DR. Gonzalez Notes to his PMD so that we can get the C spine referral in for Dr. Blas. I have faxed office notes To Dr. Mckeon at [phone]

## 2024-07-25 ENCOUNTER — TELEPHONE (OUTPATIENT)
Dept: NEUROSURGERY | Facility: CLINIC | Age: 79
End: 2024-07-25
Payer: OTHER GOVERNMENT

## 2024-07-25 NOTE — TELEPHONE ENCOUNTER
DAVE FROM THE [facility] CALLED IN TO REQUEST LAST OVN FROM JUNE FOR PATIENT     PLEASE FAX TO     [phone]    THANK YOU!

## 2024-07-26 ENCOUNTER — TELEPHONE (OUTPATIENT)
Dept: NEUROSURGERY | Facility: CLINIC | Age: 79
End: 2024-07-26

## 2024-07-26 NOTE — TELEPHONE ENCOUNTER
The [facility] received a fax that requires your attention. The document has been indexed to the patient’s chart for your review.      Reason for sending: PROVIDING AUTHORIZATION OF TREATMENT FOR CERVICALGIA.    Name of Sender: [facility]    Date Indexed: 07/26/24

## 2024-07-31 ENCOUNTER — TELEPHONE (OUTPATIENT)
Dept: NEUROSURGERY | Facility: CLINIC | Age: 79
End: 2024-07-31

## 2024-07-31 NOTE — TELEPHONE ENCOUNTER
The [facility] received a fax that requires your attention. The document has been indexed to the patient’s chart for your review.     Reason for sending: MEDICAL RECORDS NOTIFICATION     Documents Description: PAIN MANAGEMENT DENIAL_[facility]_07/26/24     Name of Sender: DAVE SESAY     Date Indexed: 07/31/24     Notes (if needed): DENIAL FOR PAIN MANAGEMENT

## 2024-08-02 ENCOUNTER — TELEPHONE (OUTPATIENT)
Dept: NEUROSURGERY | Facility: CLINIC | Age: 79
End: 2024-08-02
Payer: OTHER GOVERNMENT

## 2024-08-02 NOTE — TELEPHONE ENCOUNTER
Caller: Jermaine Denis    Relationship: Self    Best call back number: [phone]    What was the call regarding: PATIENT WAS SCHEDULED FOR PT IN [place] TODAY, BUT WAS UNABLE TO COMPLETE APPT DUE TO AN ISSUE WITH [facility]. HE IS UNCERTAIN WHAT THIS ISSUE IS, AND STATES HE HAS SPOKEN WITH [facility], WHO HAVE ADVISED THERE SHOULD NOT BE ANY PROBLEM UP TO 15 VISITS (REFERRAL # [id number]). PLEASE CALL PATIENT TO ADVISE.

## 2024-08-06 ENCOUNTER — TELEPHONE (OUTPATIENT)
Dept: NEUROSURGERY | Facility: CLINIC | Age: 79
End: 2024-08-06
Payer: OTHER GOVERNMENT

## 2024-08-06 NOTE — TELEPHONE ENCOUNTER
Spoke to patient he is aware he is suppose to go to [facility] for [facility]. He can not go to [facility] [place] due to NPI are not registered at this time with [facility].

## 2024-08-13 NOTE — TELEPHONE ENCOUNTER
Caller: Jermaine Denis    Relationship to patient: Self    Best call back number: [phone]    Patient is needing: PATIENT CALLED, ATTEMPT TO SCHEDULE PT WITH  AND WAS TOLD HE NEEDS A NEW REFERRAL IN ORDER TO BE SEEN FOR HIS NECK AND BACK. PLEASE CREATE NEW REFERRAL, PLEASE CALL PATIENT TO ADVISE.    THANK YOU

## 2024-08-14 ENCOUNTER — TELEPHONE (OUTPATIENT)
Dept: NEUROSURGERY | Facility: CLINIC | Age: 79
End: 2024-08-14
Payer: OTHER GOVERNMENT

## 2024-08-14 NOTE — TELEPHONE ENCOUNTER
Spoke to  Larry and told him to [facility]. . He understood.

## 2024-08-15 NOTE — TELEPHONE ENCOUNTER
Spoke to patient he is aware that he is only suppose to go to [facility] for PT. The NPI number at other PT places are not registered with the [facility]. Patient is aware that PT will call him to schedule appointment for him.

## 2024-08-23 ENCOUNTER — TREATMENT (OUTPATIENT)
Dept: PHYSICAL THERAPY | Facility: CLINIC | Age: 79
End: 2024-08-23
Payer: OTHER GOVERNMENT

## 2024-08-23 DIAGNOSIS — M54.2 CERVICALGIA: Primary | ICD-10-CM

## 2024-08-23 DIAGNOSIS — M43.6 STIFFNESS OF CERVICAL SPINE: ICD-10-CM

## 2024-08-23 NOTE — PROGRESS NOTES
Physical Therapy Initial Evaluation and Plan of Care                    [facility] [address] [place] [address]    Patient: Jermaine Denis   : 1945  Diagnosis/ICD-10 Code:  Cervicalgia [M54.2]  Referring practitioner: Indio ALDANA MD  Date of Initial Visit: 2024  Today's Date: 2024  Patient seen for 1 sessions           Subjective Questionnaire: NDI:18/50 = 36% Disability      Subjective Evaluation    History of Present Illness  Mechanism of injury: The patient presents to physical therapy with complaints of neck pain, back pain, and right hip pain. He would like to focus treatment on his unger. The neck pain started about a year ago without a specific RADHA. The pain has gradually worsened over time. The pain is located in the muscles of his neck (left and right) with referral to the top of his shoulder bilaterally. He denies radicular pain beyond the shoulder. His pain is increased with cervical side-bending, cervical rotation, cervical extension, and some with cervical flexion. He has hydrocodone for pain, but tries not to take it. He uses CBD oils and a hot pack on his neck for pain management.     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      Patient Occupation: Retired Pain  Current pain ratin  At best pain ratin  At worst pain ratin    Patient Goals  Patient goal: The patient would like to have less pain.         Objective          Static Posture     Head  Forward.    Shoulders  Rounded.    Tenderness     Additional Tenderness Details  Tenderness bilaterally in upper trapezius, levator scapulae, cervical paraspinals, thoracic paraspinals, and suboccipitals.    Neurological Testing     Additional Neurological Details  Sensation to light touch intact and equal bilaterally from C2-T1 dermatomal pattern.    Active Range of Motion   Cervical/Thoracic Spine   Cervical    Flexion: 35 degrees   Extension:  22 degrees with pain  Left lateral flexion: 15 degrees with pain  Right lateral flexion: 5 degrees with pain  Left rotation: 50 degrees with pain  Right rotation: 50 degrees with pain  Left Shoulder   Flexion: 145 degrees     Right Shoulder   Flexion: 145 degrees     Strength/Myotome Testing     Left Shoulder     Planes of Motion   Flexion: 4   Extension: 4+   Abduction: 4   External rotation at 0°: 4+   Internal rotation at 0°: 4+     Right Shoulder     Planes of Motion   Flexion: 4   Extension: 4+   Abduction: 4   External rotation at 0°: 4+   Internal rotation at 0°: 4+     Left Elbow   Flexion: 4+    Right Elbow   Flexion: 4+      See Exercise, Manual, and Modality Logs for complete treatment.     Assessment & Plan       Assessment  Impairments: abnormal muscle firing, abnormal muscle tone, abnormal or restricted ROM, activity intolerance, impaired physical strength, lacks appropriate home exercise program, pain with function and safety issue   Functional limitations: carrying objects, lifting, pulling, pushing, uncomfortable because of pain and unable to perform repetitive tasks   Assessment details: The patient presents to physical therapy with complaints of neck pain, low back pain, and right hip pain. However, we will focus treatment on his neck as that was his main complaint today. He presents with cervical stiffness, tenderness to palpation, intermittent headaches, and functional deficits (NDI). He would benefit from skilled physical therapy as described below to address these limitations and allow the patient to return to his prior level of function.   Prognosis: good    Goals  Plan Goals: CERVICAL PROBLEMS    1. The patient has limited ROM.    LTG 1: 12 weeks:  The patient will demonstrate 60° of bilateral cervical spine rotation, 30° of bilateral cervical side bending, 40° of cervical flexion, and 40° of cervical extension in order to adequately monitor blind spots while driving and improve ability to  perform activities of daily living.    STATUS:  New  STG 1a: 6 weeks:  The patient will demonstrate 55° of bilateral cervical spine rotation, 25° of bilateral cervical side bending, 40° of cervical flexion, and 30° of cervical extension in order to adequately monitor blind spots while driving and improve ability to perform activities of daily living.       STATUS:  New     2. The patient has complaints of pain.   LTG 2: 12 weeks:  The patient will report 1/10 pain in order to more easily tolerate activities of daily living and improve sleep quality.    STATUS:  New   STG 2a: 6 weeks:  The patient will report 3/10 pain.    STATUS:  New    3. Carrying, Moving, and Handling Objects Functional Limitation     LTG 3: 12 weeks:  The patient will demonstrate 20% limitation by achieving a score of 10/50 on the Neck Disability Index.    STATUS:  New   STG 3a: 6 weeks:  The patient will demonstrate 30% limitation by achieving a score of 15/50 on the Neck Disability Index.      STATUS:  New     Plan  Therapy options: will be seen for skilled therapy services  Planned modality interventions: cryotherapy, electrical stimulation/Russian stimulation, TENS, traction, ultrasound and dry needling  Planned therapy interventions: ADL retraining, soft tissue mobilization, strengthening, stretching, therapeutic activities, joint mobilization, home exercise program, functional ROM exercises, flexibility, body mechanics training, postural training, neuromuscular re-education, manual therapy, motor coordination training, spinal/joint mobilization and IADL retraining  Frequency: 2x week  Duration in weeks: 12  Treatment plan discussed with: patient        Visit Diagnoses:    ICD-10-CM ICD-9-CM   1. Cervicalgia  M54.2 723.1   2. Stiffness of cervical spine  M43.6 723.5       History # of Personal Factors and/or Comorbidities: LOW (0)  Examination of Body System(s): # of elements: LOW (1-2)  Clinical Presentation: STABLE   Clinical Decision  Making: LOW       Timed:         Manual Therapy:    10     mins  20664;     Therapeutic Exercise:    5     mins  13691;     Neuromuscular Chang:    0    mins  81708;    Therapeutic Activity:     0     mins  07899;     Gait Trainin     mins  02290;     Ultrasound:     0     mins  09459;    Ionto                               0    mins   39622  Self Care                       0     mins   71539  Canalith Repos    0     mins 41834      Un-Timed:  Electrical Stimulation:    0     mins  63320 (MC );  Dry Needling     0     mins self-pay  Traction     0     mins 82634  Low Eval     30     Mins  91598  Mod Eval     0     Mins  96633  High Eval                       0     Mins  18634  Re-Eval                           0    mins  21121    Timed Treatment:   15   mins   Total Treatment:     45   mins    PT SIGNATURE: Arian Dahl PT    Electronically signed 2024    [place] License: PT - [id number]      Initial Certification  Certification Period: 2024 thru 2024  I certify that the therapy services are furnished while this patient is under my care.  The services outlined above are required by this patient, and will be reviewed every 90 days.     PHYSICIAN: Indio Blas MD  NPI: [id number]      DATE:     Please sign and return via fax to [phone]. Thank you, [facility].

## 2024-08-27 ENCOUNTER — TREATMENT (OUTPATIENT)
Dept: PHYSICAL THERAPY | Facility: CLINIC | Age: 79
End: 2024-08-27
Payer: OTHER GOVERNMENT

## 2024-08-27 DIAGNOSIS — M54.2 CERVICALGIA: Primary | ICD-10-CM

## 2024-08-27 DIAGNOSIS — M43.6 STIFFNESS OF CERVICAL SPINE: ICD-10-CM

## 2024-08-27 NOTE — PROGRESS NOTES
Physical Therapy Daily Treatment Note                      [facility] [address] [place] [address]    Patient: Jermaine Denis   : 1945  Diagnosis/ICD-10 Code:  Cervicalgia [M54.2]  Referring practitioner: Indio ALDANA MD  Date of Initial Visit: Type: THERAPY  Noted: 2024  Today's Date: 2024  Patient seen for 2 sessions           Subjective   The patient reported that his neck felt a little better when he left from his last PT session.    Objective   See Exercise, Manual, and Modality Logs for complete treatment.     Assessment/Plan  The patient demonstrated good tolerance to all functional stabilization exercise today. Continue to progress per patient tolerance.       Timed:  Manual Therapy:    15     mins  94844;  Therapeutic Exercise:    10     mins  87776;     Neuromuscular Chang:   0    mins  77831;    Therapeutic Activity:     5     mins  72700;     Gait Trainin     mins  94153;     Aquatics                         0      mins  54644    Un-timed:  Mechanical Traction      0     mins  57657  Dry Needling     0     mins self-pay  Electrical Stimulation:    0     mins  63418 ( );      Timed Treatment:   30   mins   Total Treatment:     30   mins    Arian Dahl PT  Physical Therapist    Electronically signed 2024    [place] License: PT - [id number]

## 2024-08-30 ENCOUNTER — TREATMENT (OUTPATIENT)
Dept: PHYSICAL THERAPY | Facility: CLINIC | Age: 79
End: 2024-08-30
Payer: OTHER GOVERNMENT

## 2024-08-30 DIAGNOSIS — M43.6 STIFFNESS OF CERVICAL SPINE: ICD-10-CM

## 2024-08-30 DIAGNOSIS — M54.2 CERVICALGIA: Primary | ICD-10-CM

## 2024-08-30 NOTE — PROGRESS NOTES
Physical Therapy Daily Treatment Note      Patient: Jermaine Denis   : 1945  Referring practitioner: Indio ALDANA MD  Date of Initial Visit: Type: THERAPY  Noted: 2024  Today's Date: 2024  Patient seen for 3 sessions           Subjective Questionnaire:       Subjective Evaluation    History of Present Illness    Subjective comment: Pt reports his neck pops and cracks and hurts with L and R rotation and side bending.       Objective   See Exercise, Manual, and Modality Logs for complete treatment.       Assessment & Plan       Assessment  Assessment details: Pt tolerated strengthening well tolerating door stretch and chin tucks as well.  Pt reports neck always feels better after PT it just doesn't last.  Pt reported L side of neck is where most of his pain is.  Continue with POC.        Visit Diagnoses:    ICD-10-CM ICD-9-CM   1. Cervicalgia  M54.2 723.1   2. Stiffness of cervical spine  M43.6 723.5       Progress per Plan of Care and Progress strengthening /stabilization /functional activity           Timed:  Manual Therapy:   12      mins  56191;  Therapeutic Exercise:    10     mins  26592;     Neuromuscular Chang:        mins  51068;    Therapeutic Activity:     8     mins  07965;     Gait Training:           mins  21637;     Ultrasound:          mins  24278;    Electrical Stimulation:         mins  46000 ( );  Aquatic Therapy          mins  27963    Untimed:  Electrical Stimulation:         mins  15471 ( );  Mechanical Traction:         mins  80667;     Timed Treatment:   30   mins   Total Treatment:     30   mins    Electronically signed    Otilia Beckford PTA  Physical Therapist Assistant    [place] license: [id number]

## 2024-09-04 ENCOUNTER — TREATMENT (OUTPATIENT)
Dept: PHYSICAL THERAPY | Facility: CLINIC | Age: 79
End: 2024-09-04
Payer: OTHER GOVERNMENT

## 2024-09-04 DIAGNOSIS — M43.6 STIFFNESS OF CERVICAL SPINE: ICD-10-CM

## 2024-09-04 DIAGNOSIS — M54.2 CERVICALGIA: Primary | ICD-10-CM

## 2024-09-04 NOTE — PROGRESS NOTES
Outpatient Physical Therapy                   Physical Therapy Daily Treatment Note    Patient: Jermaine Denis   : 1945  Diagnosis/ICD-10 Code:  Cervicalgia [M54.2]  Referring practitioner: Indio ALDANA MD  Date of Initial Visit: Type: THERAPY  Noted: 2024  Today's Date: 2024  Patient seen for 4 sessions             Subjective   Jermaine Denis reports: no pain, just neck stiffness. Patient states manual therapy gives him relief for about an hour.     Objective     See Exercise, Manual, and Modality Logs for complete treatment.     Assessment/Plan  Patient is getting some short term relief with manual therapy but stiffness comes back quickly. Plan to continue strengthening to help improve the longevity of pain relief after manual.     Progress per Plan of Care      Timed:  Manual Therapy:    8     mins  29386;  Therapeutic Exercise:    14     mins  47871;     Neuromuscular Chang:    0    mins  76922;    Therapeutic Activity:     11     mins  90944;     Gait Trainin     mins  31701;    Aquatic Therapy:     0     mins  35159;       Untimed:  Electrical Stimulation:    0     mins  33667 ( );  Mechanical Traction:    0     mins  96540;       Timed Treatment:   33   mins   Total Treatment:     33   mins      Electronically signed:   Shyanne Finn PTA  Physical Therapist Assistant  [facility] [place] License #: [id number]  
n/a

## 2024-09-06 ENCOUNTER — TREATMENT (OUTPATIENT)
Dept: PHYSICAL THERAPY | Facility: CLINIC | Age: 79
End: 2024-09-06
Payer: OTHER GOVERNMENT

## 2024-09-06 DIAGNOSIS — M54.2 CERVICALGIA: Primary | ICD-10-CM

## 2024-09-06 DIAGNOSIS — M43.6 STIFFNESS OF CERVICAL SPINE: ICD-10-CM

## 2024-09-06 NOTE — PROGRESS NOTES
Physical Therapy Daily Treatment Note                      [facility] [address] [place] [address]    Patient: Jermaine Denis   : 1945  Diagnosis/ICD-10 Code:  Cervicalgia [M54.2]  Referring practitioner: Indio ALDANA MD  Date of Initial Visit: Type: THERAPY  Noted: 2024  Today's Date: 2024  Patient seen for 5 sessions           Subjective   The patient reported that he gets some relief from manual cervical traction.    Objective   See Exercise, Manual, and Modality Logs for complete treatment.     Assessment/Plan    The patient demonstrated good tolerance to all functional shoulder strengthening exercise. He responds favorably to manual therapy. Continue to progress with current PT plan of care per patient tolerance.         Timed:  Manual Therapy:    12     mins  44728;  Therapeutic Exercise:    10     mins  83799;     Neuromuscular Chang:   0    mins  61652;    Therapeutic Activity:     8     mins  28791;     Gait Trainin     mins  35954;     Aquatics                         0      mins  48432    Un-timed:  Mechanical Traction      0     mins  41802  Dry Needling     0     mins self-pay  Electrical Stimulation:    0     mins  76511 ( );      Timed Treatment:   30   mins   Total Treatment:     30   mins    Arian Dahl PT  Physical Therapist    Electronically signed 2024    [place] License: PT - [id number]

## 2024-09-11 ENCOUNTER — TREATMENT (OUTPATIENT)
Dept: PHYSICAL THERAPY | Facility: CLINIC | Age: 79
End: 2024-09-11
Payer: OTHER GOVERNMENT

## 2024-09-11 DIAGNOSIS — M43.6 STIFFNESS OF CERVICAL SPINE: ICD-10-CM

## 2024-09-11 DIAGNOSIS — M54.2 CERVICALGIA: Primary | ICD-10-CM

## 2024-09-11 NOTE — PROGRESS NOTES
Outpatient Physical Therapy                   Physical Therapy Daily Treatment Note    Patient: Jermaine Denis   : 1945  Diagnosis/ICD-10 Code:  Cervicalgia [M54.2]  Referring practitioner: Indio ALDANA MD  Date of Initial Visit: Type: THERAPY  Noted: 2024  Today's Date: 2024  Patient seen for 6 sessions             Subjective   Jermaine Denis reports: his pain is constant but he does get some relief for about a day after therapy sessions most visits.     Pain: 6/10 pain, located in his neck.     Objective     See Exercise, Manual, and Modality Logs for complete treatment.     Assessment/Plan  Patient tolerated session well, with some pain relief during manual therapy. Pt would benefit from skilled PT to address Range of Motion  and Strength deficits, pain management and any concerns with ADLs.     Progress per Plan of Care      Timed:  Manual Therapy:    13     mins  92916;  Therapeutic Exercise:    8     mins  62359;     Neuromuscular Chang:    0    mins  01184;    Therapeutic Activity:     8     mins  75063;     Gait Trainin     mins  91830;    Aquatic Therapy:     0     mins  69279;       Untimed:  Electrical Stimulation:    0     mins  99287 ( );  Mechanical Traction:    0     mins  46502;       Timed Treatment:   29   mins   Total Treatment:     29   mins      Electronically signed:   Shyanne Finn PTA  Physical Therapist Assistant  [facility] License #: [id number]

## 2024-09-13 ENCOUNTER — TREATMENT (OUTPATIENT)
Dept: PHYSICAL THERAPY | Facility: CLINIC | Age: 79
End: 2024-09-13
Payer: OTHER GOVERNMENT

## 2024-09-13 DIAGNOSIS — M54.2 CERVICALGIA: Primary | ICD-10-CM

## 2024-09-13 DIAGNOSIS — M43.6 STIFFNESS OF CERVICAL SPINE: ICD-10-CM

## 2024-09-13 NOTE — PROGRESS NOTES
Outpatient Physical Therapy                   Physical Therapy Daily Treatment Note    Patient: Jermaine Denis   : 1945  Diagnosis/ICD-10 Code:  Cervicalgia [M54.2]  Referring practitioner: Indio ALDANA MD  Date of Initial Visit: Type: THERAPY  Noted: 2024  Today's Date: 2024  Patient seen for 7 sessions             Subjective   Jermaine Denis reports: he started getting relief as soon as he left last session and his pain is a lot better than last session.     Pain: 3-4/10 pain, at time of arrival, mostly he just has muscle soreness.     Objective     See Exercise, Manual, and Modality Logs for complete treatment.     Assessment/Plan  Jemraine progressing as evident by decreased overall neck pain. Pt tolerated exercises well, just general fatigue. Pt would benefit from skilled PT to address Range of Motion  and Strength deficits, pain management and any concerns with ADLs.     Progress per Plan of Care      Timed:  Manual Therapy:    10     mins  12463;  Therapeutic Exercise:    8     mins  24072;     Neuromuscular Chang:    0    mins  05866;    Therapeutic Activity:     8     mins  20986;     Gait Trainin     mins  23183;    Aquatic Therapy:     0     mins  43623;       Untimed:  Electrical Stimulation:    0     mins  03510 ( );  Mechanical Traction:    0     mins  31481;       Timed Treatment:   26   mins   Total Treatment:     26   mins      Electronically signed:   Shyanne Finn PTA  Physical Therapist Assistant  [facility] License #: [id number]

## 2024-09-18 ENCOUNTER — TREATMENT (OUTPATIENT)
Dept: PHYSICAL THERAPY | Facility: CLINIC | Age: 79
End: 2024-09-18
Payer: OTHER GOVERNMENT

## 2024-09-18 DIAGNOSIS — M54.2 CERVICALGIA: Primary | ICD-10-CM

## 2024-09-18 DIAGNOSIS — M43.6 STIFFNESS OF CERVICAL SPINE: ICD-10-CM

## 2024-09-20 ENCOUNTER — TREATMENT (OUTPATIENT)
Dept: PHYSICAL THERAPY | Facility: CLINIC | Age: 79
End: 2024-09-20
Payer: OTHER GOVERNMENT

## 2024-09-20 DIAGNOSIS — M54.2 CERVICALGIA: Primary | ICD-10-CM

## 2024-09-20 DIAGNOSIS — M43.6 STIFFNESS OF CERVICAL SPINE: ICD-10-CM

## 2024-09-24 ENCOUNTER — TREATMENT (OUTPATIENT)
Dept: PHYSICAL THERAPY | Facility: CLINIC | Age: 79
End: 2024-09-24
Payer: OTHER GOVERNMENT

## 2024-09-24 DIAGNOSIS — M43.6 STIFFNESS OF CERVICAL SPINE: ICD-10-CM

## 2024-09-24 DIAGNOSIS — M54.2 CERVICALGIA: Primary | ICD-10-CM

## 2024-09-24 PROCEDURE — 97110 THERAPEUTIC EXERCISES: CPT | Performed by: PHYSICAL THERAPIST

## 2024-09-24 PROCEDURE — 97140 MANUAL THERAPY 1/> REGIONS: CPT | Performed by: PHYSICAL THERAPIST

## 2024-09-27 ENCOUNTER — TREATMENT (OUTPATIENT)
Dept: PHYSICAL THERAPY | Facility: CLINIC | Age: 79
End: 2024-09-27
Payer: OTHER GOVERNMENT

## 2024-09-27 DIAGNOSIS — M54.2 CERVICALGIA: Primary | ICD-10-CM

## 2024-09-27 DIAGNOSIS — M43.6 STIFFNESS OF CERVICAL SPINE: ICD-10-CM

## 2024-09-30 ENCOUNTER — TREATMENT (OUTPATIENT)
Dept: PHYSICAL THERAPY | Facility: CLINIC | Age: 79
End: 2024-09-30
Payer: OTHER GOVERNMENT

## 2024-09-30 DIAGNOSIS — M43.6 STIFFNESS OF CERVICAL SPINE: ICD-10-CM

## 2024-09-30 DIAGNOSIS — M54.2 CERVICALGIA: Primary | ICD-10-CM

## 2024-09-30 NOTE — PROGRESS NOTES
Physical Therapy Daily Treatment Note                      [facility] [address] [place] [address]    Patient: Jermaine Denis   : 1945  Diagnosis/ICD-10 Code:  Cervicalgia [M54.2]  Referring practitioner: Indio ALDANA MD  Date of Initial Visit: Type: THERAPY  Noted: 2024  Today's Date: 2024  Patient seen for 12 sessions           Subjective   The patient reported that his pain level is a 0/10 today. He has felt good since his last PT session.    Objective   See Exercise, Manual, and Modality Logs for complete treatment.     Assessment/Plan    The patient demonstrated good tolerance to all functional shoulder strengthening exercise and manual therapy. Continue to progress with current PT plan of care per patient tolerance.         Timed:  Manual Therapy:    10     mins  41836;  Therapeutic Exercise:    12     mins  55531;     Neuromuscular Chang:   0    mins  48766;    Therapeutic Activity:     8     mins  70480;     Gait Trainin     mins  04202;     Aquatics                         0      mins  86932    Un-timed:  Mechanical Traction      0     mins  40060  Dry Needling     0     mins self-pay  Electrical Stimulation:    0     mins  98891 ( );      Timed Treatment:   30   mins   Total Treatment:     30   mins    Arian Dahl PT  Physical Therapist    Electronically signed 2024    [place] License: PT - [id number]

## 2024-10-02 ENCOUNTER — TREATMENT (OUTPATIENT)
Dept: PHYSICAL THERAPY | Facility: CLINIC | Age: 79
End: 2024-10-02
Payer: OTHER GOVERNMENT

## 2024-10-02 DIAGNOSIS — M54.2 CERVICALGIA: Primary | ICD-10-CM

## 2024-10-02 DIAGNOSIS — M43.6 STIFFNESS OF CERVICAL SPINE: ICD-10-CM

## 2024-10-02 NOTE — PROGRESS NOTES
Outpatient Physical Therapy                   Physical Therapy Daily Treatment Note    Patient: Jermaine Denis   : 1945  Diagnosis/ICD-10 Code:  Cervicalgia [M54.2]  Referring practitioner: Indio ALDANA MD  Date of Initial Visit: Type: THERAPY  Noted: 2024  Today's Date: 10/2/2024  Patient seen for 13 sessions             Subjective   Jermaine Denis reports: he hasn't felt stiff since before his visit on 24.     Pain: 0/10 pain, pain at time of arrival.     Objective     See Exercise, Manual, and Modality Logs for complete treatment.     Assessment/Plan  Patient tolerated session well. Patient is still feeling good with no pain or stiffness. Therapist and patient discussed trying to go without manual therapy to see how patient feels without it. We agreed to wait due to the next appt being a week away. Pt would benefit from skilled PT to address Range of Motion  and Strength deficits, pain management and any concerns with ADLs.     Progress per Plan of Care      Timed:  Manual Therapy:    8     mins  62382;  Therapeutic Exercise:    13     mins  66341;     Neuromuscular Chang:    0    mins  42323;    Therapeutic Activity:     9     mins  36473;     Gait Trainin     mins  04797;    Aquatic Therapy:     0     mins  66868;       Untimed:  Electrical Stimulation:    0     mins  51153 ( );  Mechanical Traction:    0     mins  49012;       Timed Treatment:   30   mins   Total Treatment:     30   mins      Electronically signed:   Shyanne Finn PTA  Physical Therapist Assistant  [facility] License #: [id number]

## 2024-10-09 ENCOUNTER — TREATMENT (OUTPATIENT)
Dept: PHYSICAL THERAPY | Facility: CLINIC | Age: 79
End: 2024-10-09
Payer: OTHER GOVERNMENT

## 2024-10-09 DIAGNOSIS — M54.2 CERVICALGIA: Primary | ICD-10-CM

## 2024-10-09 DIAGNOSIS — M43.6 STIFFNESS OF CERVICAL SPINE: ICD-10-CM

## 2024-10-09 NOTE — PROGRESS NOTES
Outpatient Physical Therapy                   Physical Therapy Daily Treatment Note    Patient: Jermaine Denis   : 1945  Diagnosis/ICD-10 Code:  Cervicalgia [M54.2]  Referring practitioner: Indio ALDANA MD  Date of Initial Visit: Type: THERAPY  Noted: 2024  Today's Date: 10/9/2024  Patient seen for 14 sessions             Subjective   Jermaine Denis reports: he got an injection in his wrist yesterday; its hurting some today. 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 Patient states he was able to sleep last night so its not too bad.     Pain: 2/10 pain, located in his neck.     Objective     See Exercise, Manual, and Modality Logs for complete treatment.     Assessment/Plan  Patient is progressing as evident by decreased pain with increased activity. Patient states he has to get under his truck again today to finish up the job. Patient tolerated increased reps of exercises with no complaints of pain; just general fatigue.     Progress per Plan of Care      Timed:  Manual Therapy:    9     mins  12681;  Therapeutic Exercise:    10     mins  97013;     Neuromuscular Chang:    0    mins  63053;    Therapeutic Activity:     8     mins  57684;     Gait Trainin     mins  27483;    Aquatic Therapy:     0     mins  56406;       Untimed:  Electrical Stimulation:    0     mins  82772 ( );  Mechanical Traction:    0     mins  48306;       Timed Treatment:   27   mins   Total Treatment:     27   mins      Electronically signed:   Shyanne Finn PTA  Physical Therapist Assistant  [facility] License #: [id number]

## 2024-10-11 ENCOUNTER — TREATMENT (OUTPATIENT)
Dept: PHYSICAL THERAPY | Facility: CLINIC | Age: 79
End: 2024-10-11
Payer: OTHER GOVERNMENT

## 2024-10-11 DIAGNOSIS — M43.6 STIFFNESS OF CERVICAL SPINE: ICD-10-CM

## 2024-10-11 DIAGNOSIS — M54.2 CERVICALGIA: Primary | ICD-10-CM

## 2024-10-11 NOTE — PROGRESS NOTES
"Outpatient Physical Therapy                   Physical Therapy Daily Treatment Note    Patient: Jermaine Denis   : 1945  Diagnosis/ICD-10 Code:  Cervicalgia [M54.2]  Referring practitioner: Indio ALDANA MD  Date of Initial Visit: Type: THERAPY  Noted: 2024  Today's Date: 10/11/2024  Patient seen for 15 sessions             Subjective   Jermaine Denis reports: he normally wakes up with a stiff neck and he hasn't been lately.     Pain: 0.5/10 pain, \"hardly anything\"     Objective     See Exercise, Manual, and Modality Logs for complete treatment.     Assessment/Plan  Patient was given a progressed home exercise program today. He has not been doing his HEP due to being busy but was encouraged to make time for the new exercises. Patient tolerated exercises well with no complaints of increased pain or stiffness. Patient agreed to holding manual therapy today to see how he does without it.     Progress per Plan of Care      Timed:  Manual Therapy:    0     mins  44595;  Therapeutic Exercise:    16     mins  10197;     Neuromuscular Chang:    0    mins  46312;    Therapeutic Activity:     10     mins  58094;     Gait Trainin     mins  71152;    Aquatic Therapy:     0     mins  81839;       Untimed:  Electrical Stimulation:    0     mins  66090 ( );  Mechanical Traction:    0     mins  26858;       Timed Treatment:   26   mins   Total Treatment:     26   mins      Electronically signed:   Shyanne Finn PTA  Physical Therapist Assistant  [place] [facility] License #: [id number]  "

## 2024-10-14 ENCOUNTER — TREATMENT (OUTPATIENT)
Dept: PHYSICAL THERAPY | Facility: CLINIC | Age: 79
End: 2024-10-14
Payer: OTHER GOVERNMENT

## 2024-10-14 DIAGNOSIS — M54.2 CERVICALGIA: Primary | ICD-10-CM

## 2024-10-14 DIAGNOSIS — M43.6 STIFFNESS OF CERVICAL SPINE: ICD-10-CM

## 2024-10-14 PROCEDURE — 97140 MANUAL THERAPY 1/> REGIONS: CPT | Performed by: PHYSICAL THERAPIST

## 2024-10-14 PROCEDURE — 97110 THERAPEUTIC EXERCISES: CPT | Performed by: PHYSICAL THERAPIST

## 2024-10-14 NOTE — PROGRESS NOTES
Physical Therapy Daily Treatment Note                      [facility] [address] [place] [address]    Patient: Jermaine Denis   : 1945  Diagnosis/ICD-10 Code:  Cervicalgia [M54.2]  Referring practitioner: Indio ALDANA MD  Date of Initial Visit: Type: THERAPY  Noted: 2024  Today's Date: 10/14/2024  Patient seen for 16 sessions           Subjective   The patient reported that his right shoulder/neck pain is a 5/10 today. His dog yanked his arm while on a leash on Saturday and he has been sore since.    Objective   See Exercise, Manual, and Modality Logs for complete treatment.     Assessment/Plan    The patient demonstrated good tolerance to all functional shoulder strengthening and manual therapy interventions. Continue to progress with current PT plan of care per patient tolerance.         Timed:  Manual Therapy:    10     mins  76697;  Therapeutic Exercise:    13     mins  64505;     Neuromuscular Chang:   0    mins  24720;    Therapeutic Activity:     0     mins  46366;     Gait Trainin     mins  05541;     Aquatics                         0      mins  05169    Un-timed:  Mechanical Traction      0     mins  62387  Dry Needling     0     mins self-pay  Electrical Stimulation:    0     mins  70374 ( );      Timed Treatment:   23   mins   Total Treatment:     32   mins    Arian Dahl PT  Physical Therapist    Electronically signed 10/14/2024    [place] License: PT - [id number]

## 2024-10-17 ENCOUNTER — TELEPHONE (OUTPATIENT)
Dept: NEUROSURGERY | Facility: CLINIC | Age: 79
End: 2024-10-17

## 2024-10-17 NOTE — TELEPHONE ENCOUNTER
[facility] received a fax that requires your attention. The document has been indexed to the patient’s chart for your review.      Reason for sending: NOTIFICATION OF RECORD    Documents Description: REFERRAL/AUTH FORM UPDATED 12/12/24    Name of Sender: KARLY DAUGHERTY [facility]     Date Indexed: 10/17/24    Notes (if needed): FOR REVIEW

## 2024-12-10 ENCOUNTER — TELEPHONE (OUTPATIENT)
Dept: NEUROSURGERY | Facility: CLINIC | Age: 79
End: 2024-12-10

## 2024-12-10 NOTE — PROGRESS NOTES
Subjective   Patient ID: Jermaine Denis is a 79 y.o. male is here today for follow-up and epidural.    History of Present Illness    He was last seen in office by Dr Blas in June 2024.  At that time he had new lumbar and cervical MRI imaging which revealed gradually worsening retrolisthesis at L2-3 and only minimal findings in the cervical spine.  He was referred to pain management for lumbar epidurals and follows up in the office today.    Patient states that he has not had epidurals since his last office visit.  He does continue to have acute on chronic pain in the low back as well is in the medial right knee.  He denies any radiating pain down his legs.  He is getting injections in his neck which are helping and have decreased his prevalence of headaches.  He continues to walk a mile and a half every day.  He has been followed closely by Ortho for the right sided knee pain.  He completed physical therapy for neck pain in October, but does the daily home exercises.  He states that he is overall doing and feeling well.  He is no longer walking with a cane.  He does have lots of stiffness in his back every morning, but he usually takes an Aleve or ibuprofen for this, which does help.  He overall states that he is feeling well.  He has not had any leg pain since his surgery with Dr Blas in February 2023.    He presents unaccompanied.    The following portions of the patient's history were reviewed and updated as appropriate: allergies, current medications, past family history, past medical history, past social history, past surgical history, and problem list.    Review of Systems      /74   Pulse 50   Wt 100 kg (221 lb)   SpO2 98%   BMI 37.93 kg/m²       Objective     Vitals:    12/11/24 1134   BP: 132/74   Pulse: 50   SpO2: 98%   Weight: 100 kg (221 lb)   PainSc:   5     Body mass index is 37.93 kg/m².      Physical Exam  Vitals reviewed.   Constitutional:       Appearance: He is obese.   HENT:      Head:  Atraumatic.   Pulmonary:      Effort: Pulmonary effort is normal.   Musculoskeletal:         General: Tenderness (Minimal tenderness in the bilateral low back) present.      Comments: Full lumbar range of motion, strength appears equal and intact bilateral lower extremities   Skin:     General: Skin is warm and dry.   Neurological:      Mental Status: He is alert and oriented to person, place, and time.      GCS: GCS eye subscore is 4. GCS verbal subscore is 5. GCS motor subscore is 6.      Motor: No weakness or tremor.      Gait: Gait normal.      Comments: Gait is stable and upright, nonantalgic   Psychiatric:         Mood and Affect: Mood normal.       Neurological Exam  Mental Status  Alert. Oriented to person, place, and time.    Coordination  No tremor    Gait   Normal gait.          Assessment & Plan   Independent Review of Radiographic Studies:      I personally reviewed the images from the following studies.    No new imaging    Physical therapy notes reviewed      Medical Decision Making:      The office today for 6-month follow-up with history of neck and acute on chronic low back pain.  Exam as noted above, no red flags.  He overall appears to be doing very well.  He does have stiffness in his back every morning, but this improves with over-the-counter NSAIDs.  He currently reports low back discomfort as a 4 out of 10.  He continues to walk a mile and a half every day and is trying to increase this distance slowly and as tolerated.  From our standpoint he is stable.  No need for any new imaging at this time.  He continues to do his home PT exercises.  We will see him back in 6 months for clinical follow-up.  He should call at anytime with any questions or concerns.      Plan: Return to office in 6 months      Diagnoses and all orders for this visit:    1. Cervicalgia (Primary)    2. Chronic midline low back pain without sciatica      Return in about 6 months (around 6/11/2025).

## 2024-12-11 ENCOUNTER — OFFICE VISIT (OUTPATIENT)
Dept: NEUROSURGERY | Facility: CLINIC | Age: 79
End: 2024-12-11
Payer: OTHER GOVERNMENT

## 2024-12-11 VITALS
WEIGHT: 221 LBS | HEART RATE: 50 BPM | OXYGEN SATURATION: 98 % | BODY MASS INDEX: 37.93 KG/M2 | SYSTOLIC BLOOD PRESSURE: 132 MMHG | DIASTOLIC BLOOD PRESSURE: 74 MMHG

## 2024-12-11 DIAGNOSIS — M54.2 CERVICALGIA: Primary | ICD-10-CM

## 2024-12-11 DIAGNOSIS — M54.50 CHRONIC MIDLINE LOW BACK PAIN WITHOUT SCIATICA: ICD-10-CM

## 2024-12-11 DIAGNOSIS — G89.29 CHRONIC MIDLINE LOW BACK PAIN WITHOUT SCIATICA: ICD-10-CM

## 2024-12-16 ENCOUNTER — OFFICE VISIT (OUTPATIENT)
Dept: ORTHOPEDIC SURGERY | Facility: CLINIC | Age: 79
End: 2024-12-16
Payer: OTHER GOVERNMENT

## 2024-12-16 VITALS — HEIGHT: 64 IN | TEMPERATURE: 98.7 F | BODY MASS INDEX: 36.48 KG/M2 | WEIGHT: 213.7 LBS

## 2024-12-16 DIAGNOSIS — Z96.641 S/P TOTAL RIGHT HIP ARTHROPLASTY: Primary | ICD-10-CM

## 2024-12-16 NOTE — PROGRESS NOTES
Patient: Jermaine Denis  YOB: 1945 79 y.o. male  Medical Record Number: [id number]    Chief Complaint:   Chief Complaint   Patient presents with    Right Hip - Follow-up       History of Present Illness:Jermaine Denis is a 79 y.o. male who presents for follow-up of right total hip.  Patient states he is doing well.  He is got rid of his cane and has been walking about a mile to mile and a half daily with his dog.  He has been mindful with the dog to avoid any falls.  Overall he feels that he is continue to progress.  No complaints today.    Allergies:   Allergies   Allergen Reactions    Darunavir Myalgia     PATIENT STATES STIFF JOINTS, COULDN'T MOVE NECK    Sulfa Antibiotics Hives       Medications:   Current Outpatient Medications   Medication Sig Dispense Refill    acetaminophen (TYLENOL) 325 MG tablet Take 2 tablets by mouth Every 6 (Six) Hours As Needed for Mild Pain or Fever.      aspirin 81 MG EC tablet Take 1 tab po twice daily X 2 weeks, then 1 tab po daily      calcium citrate-vitamin d (CALCITRATE) 315-250 MG-UNIT tablet tablet Take 2 tablets by mouth 2 (Two) Times a Day.      carboxymethylcellulose (REFRESH PLUS) 0.5 % solution INSTILL 1 DROP IN EACH EYE FOUR TIMES A DAY FOR DRY EYE      cephalexin (KEFLEX) 500 MG capsule Take 1 capsule by mouth Every Morning.      cetirizine (zyrTEC) 10 MG tablet Take 1 tablet by mouth Daily As Needed for Allergies.      ferrous sulfate 325 (65 FE) MG tablet Take 1 tablet by mouth 2 (Two) Times a Day.      finasteride (PROSCAR) 5 MG tablet Take 1 tablet by mouth Daily.      fluticasone (FLONASE) 50 MCG/ACT nasal spray USE 2 SPRAYS IN EACH NOSTRIL DAILY FOR ALLERGIES - SHAKE WELL BEFORE USING.      furosemide (LASIX) 20 MG tablet Take 1 tablet by mouth Every Morning.      gabapentin (NEURONTIN) 400 MG capsule Take 2 capsules by mouth 2 (Two) Times a Day.      glipizide (GLUCOTROL) 5 MG tablet Take 1 tablet by mouth Daily.      glucose blood test strip USE 1  STRIP AS DIRECTED TO TEST BLOOD SUGAR THREE TIMES A WEEK      hydrocortisone 2.5 % cream Apply 1 Application topically to the appropriate area as directed As Needed.      ketoconazole (NIZORAL) 2 % cream Apply 1 Application topically to the appropriate area as directed As Needed.      ketoconazole (NIZORAL) 2 % shampoo Apply 1 application  topically to the appropriate area as directed As Needed.      loratadine (CLARITIN) 10 MG tablet Take 1 tablet by mouth Daily As Needed.      Melatonin 10 MG tablet Take 1-2 tablets by mouth At Night As Needed.      Omega-3 Fatty Acids (FISH OIL) 1000 MG capsule capsule Take 1 capsule by mouth 3 (Three) Times a Day With Meals. HOLD PRIOR TO SURG      omeprazole (priLOSEC) 20 MG capsule Take 1 capsule by mouth 2 (Two) Times a Day.      Probiotic Product (PROBIOTIC DAILY PO) Take 1 tablet by mouth Daily.      simvastatin (ZOCOR) 20 MG tablet Take 0.5 tablets by mouth Every Night.      tamsulosin (FLOMAX) 0.4 MG capsule 24 hr capsule Take 1 capsule by mouth Every Night.      traZODone (DESYREL) 50 MG tablet Take 1 tablet by mouth At Night As Needed for Sleep.      vitamin B-12 (CYANOCOBALAMIN) 500 MCG tablet Take 1 tablet by mouth Daily.       No current facility-administered medications for this visit.     Facility-Administered Medications Ordered in Other Visits   Medication Dose Route Frequency Provider Last Rate Last Admin    mupirocin (BACTROBAN) 2 % nasal ointment   Nasal BID Alex Ordaz MD             The following portions of the patient's history were reviewed and updated as appropriate: allergies, current medications, past family history, past medical history, past social history, past surgical history and problem list.    Review of Systems:   A 14-point review of systems was performed. All systems negative except pertinent positives/negative listed in HPI above    Physical Exam:   Vitals:    12/16/24 1012   Temp: 98.7 °F (37.1 °C)   TempSrc: Temporal   Weight: 96.9  "kg (213 lb 11.2 oz)   Height: 162.6 cm (64.02\")   PainSc:   4   PainLoc: Hip       General: A and O x 3, ASA, NAD    SCLERAE:    Normal    DENTITION:   Normal  Right hip examined gentle hip range of motion well-tolerated negative straight leg raise and Stinchfield.  Motor and sensory at baseline distally.    Radiology:    Xrays 2views (AP bilateral hips and lateral hip) were ordered and reviewed for evaluation of previous total hip replacement demonstrating a well positioned total hip without evidence of wear, loosening, or osteoarthritis  Comparison views: todays xrays were compared to previous xrays and demonstrate no change    Assessment/Plan:  1 year status post right total hip    Patient is doing well.  Encouraged him to continue building strength and endurance.  I encouraged him to be mindful of any fall risk such as walking the dog expressed understanding of this.  Continue to watch his weight.  Still recommend antibiotics for any dental cleaning or procedures he will give us a call several weeks prior if needed.  At this time we will plan to see him in a year however if he is doing great in a year he can always cancel that appointment and just see us at the 5-year anniversary of surgery.  Patient expressed understanding this plan all questions answered.      Renny Iqbal MD  12/16/2024   "

## 2025-01-17 ENCOUNTER — TRANSCRIBE ORDERS (OUTPATIENT)
Dept: ADMINISTRATIVE | Facility: HOSPITAL | Age: 80
End: 2025-01-17
Payer: OTHER GOVERNMENT

## 2025-01-17 DIAGNOSIS — M81.0 AGE-RELATED OSTEOPOROSIS WITHOUT CURRENT PATHOLOGICAL FRACTURE: Primary | ICD-10-CM

## 2025-02-05 ENCOUNTER — HOSPITAL ENCOUNTER (OUTPATIENT)
Dept: BONE DENSITY | Facility: HOSPITAL | Age: 80
Discharge: HOME | End: 2025-02-05
Admitting: INTERNAL MEDICINE
Payer: OTHER GOVERNMENT

## 2025-02-05 DIAGNOSIS — M81.0 AGE-RELATED OSTEOPOROSIS WITHOUT CURRENT PATHOLOGICAL FRACTURE: ICD-10-CM

## 2025-02-05 PROCEDURE — 77080 DXA BONE DENSITY AXIAL: CPT

## 2025-04-18 ENCOUNTER — TELEPHONE (OUTPATIENT)
Dept: NEUROSURGERY | Facility: CLINIC | Age: 80
End: 2025-04-18
Payer: OTHER GOVERNMENT

## 2025-04-18 NOTE — TELEPHONE ENCOUNTER
[facility] received a fax that requires your attention. The document has been indexed to the patient’s chart for your review.      Reason for sending: RECEIVED NOTICE THAT A NEW [facility] AUTH WILL BE NEEDED IF ANY PLANS TO FOLLOW UP APPTS,  [id number]  DATE 06/10/25, PLEASE CONTACT [facility] AND SEND AN RFS FORM WITH SUPPORTING NOTES.    Documents Description: NOTICE OF CURRENT [facility] AUTH IS EXPIRING_KARLY DAUGHERTY [facility]_04/15/25    Name of Sender: KARLY DAUGHERTY [facility]    Date Indexed: 25    Notes (if needed): ATTN: DR PARK [facility] TEAM

## 2025-04-29 ENCOUNTER — TELEPHONE (OUTPATIENT)
Dept: NEUROSURGERY | Facility: CLINIC | Age: 80
End: 2025-04-29

## 2025-04-29 NOTE — TELEPHONE ENCOUNTER
[facility] received a fax that requires your attention. The document has been indexed to the patient’s chart for your review.      Reason for sending: REQUESTED [facility] AUTH    Documents Description: CONTINUITY OF CARE AUTH 4/24/25    Name of Sender: MARGO DAUGHERTY [facility]    Date Indexed: 4/29/25

## 2025-05-27 NOTE — PROGRESS NOTES
Patient ID: Jermaine Denis is a 80 y.o. male is here today for follow-up for Cervicalgia.    Pt reports pain in the right of the lower back   Subjective     The patient is here in regards to   Chief Complaint   Patient presents with    Neck Pain       History of Present Illness  Jermaine walks 2 to 3 miles per day in the morning.  He does not have significant back pain centrally while he is walking but he does have irritation of his right buttocks area underneath the gluteal cleft.  This seems to be the major source of irritation for him.  He does have some back pain when he leans forward and bends down to pick things up but that resolved spontaneously.  Denies any overt sciatica or radiculopathy compared to his preoperative findings.      While in the room and during my examination of the patient, appropriate PPE was worn by both the patient and the physician.    The following portions of the patient's history were reviewed and updated as appropriate: allergies, current medications, past family history, past medical history, past social history, past surgical history and problem list.    Review of Systems   Constitutional:  Positive for fatigue. Negative for fever.   Eyes:  Negative for visual disturbance.   Gastrointestinal:  Negative for nausea and vomiting.   Genitourinary:  Negative for difficulty urinating.   Musculoskeletal:  Positive for back pain, neck pain and neck stiffness. Negative for gait problem.   Neurological:  Positive for dizziness, weakness and light-headedness. Negative for numbness and headaches.   Psychiatric/Behavioral:  Negative for confusion and decreased concentration.         Past Medical History:   Diagnosis Date    Anemia     CHRONIC    Arthritis     BPH (benign prostatic hyperplasia)     Diabetes mellitus     GERD (gastroesophageal reflux disease)     H/O gastric bypass     High cholesterol     History of bladder cancer     3 YR AGO    History of COVID-19     PT CAN NOT REMEMBER WHEN     History of hepatitis C     treated medically    History of infection     AFTER KNEE SURGERY ON ANTIBIOTICS DAILY    History of kidney stones     History of renal cell cancer     HAD TUMOR REMOVED    History of transfusion     Hypertension     Low back pain     Neck pain     Nerve damage     RIGHT HAND    Neuropathy     FEET    Psoriasis     Right hip pain     Right leg pain     Sleep apnea     NOT CURRENTLY USING MACHINE    Urethral stricture     caths self prn    UTI (urinary tract infection)     freq uti       Allergies   Allergen Reactions    Darunavir Myalgia     PATIENT STATES STIFF JOINTS, COULDN'T MOVE NECK    Sulfa Antibiotics Hives       Family History   Problem Relation Age of Onset    Malig Hyperthermia Neg Hx     Colon cancer Neg Hx        Social History     Socioeconomic History    Marital status:    Tobacco Use    Smoking status: Former     Current packs/day: 0.00     Average packs/day: 2.0 packs/day for 15.0 years (30.0 ttl pk-yrs)     Types: Cigarettes     Start date:      Quit date:      Years since quittin.4    Smokeless tobacco: Never   Vaping Use    Vaping status: Never Used   Substance and Sexual Activity    Alcohol use: Yes     Comment: bourbon/beer on occasion    Drug use: No    Sexual activity: Defer       Past Surgical History:   Procedure Laterality Date    ANKLE SURGERY Right     orif with hardware    CARPAL TUNNEL RELEASE Left     CATARACT EXTRACTION WITH INTRAOCULAR LENS IMPLANT Bilateral     COLONOSCOPY      COLONOSCOPY N/A 2022    Procedure: COLONOSCOPY FOR SCREENING;  Surgeon: Mik Joaquin MD;  Location: [facility] ENDOSCOPY;  Service: Gastroenterology;  Laterality: N/A;  DIVERTICULOSIS, hemorrhoids    CYSTOSCOPY N/A 2023    Procedure: CYSTOSCOPY FLEXIBLE, insertion of jean baptiste catheter with urethral dilation;  Surgeon: Jermain Duran MD;  Location: [facility] OR;  Service: Urology;  Laterality: N/A;    ENDOSCOPY      GASTRIC BYPASS           HAND SURGERY Right     thumb     KNEE ARTHROPLASTY Left     LUMBAR FUSION N/A 02/27/2023    Procedure: LUMBAR THREE TO FOUR, FOUR TO FIVE TRANSFORAMINAL LUMBAR INTERBODY FUSION WITH LUMBAR THREE TO FOUR, FOUR TO FIVE, FIVE TO SACRAL ONE, SACRAL ONE TO TWO INSTRUMENTATION WITH ROBOTIC ASSISTANCE;  Surgeon: Indio Blas MD;  Location: [facility] OR;  Service: Robotics - Neuro;  Laterality: N/A;    NEPHRECTOMY PARTIAL      ORIF WRIST FRACTURE Left     TOTAL HIP ARTHROPLASTY Right 12/13/2023    Procedure: Right posterior total hip arthroplasty;  Surgeon: Renny Iqbal MD;  Location: [facility] OSC;  Service: Orthopedics;  Laterality: Right;    TOTAL SHOULDER ARTHROPLASTY W/ DISTAL CLAVICLE EXCISION Right 01/18/2018    Procedure: Reverse Total Shoulder Arthroplasty;  Surgeon: Alex Ordaz MD;  Location: [facility] OR;  Service:     TOTAL SHOULDER ARTHROPLASTY W/ DISTAL CLAVICLE EXCISION Left 06/28/2018    Procedure: TOTAL SHOULDER REVERSE ARTHROPLASTY;  Surgeon: Alex Ordaz MD;  Location: [facility] OR;  Service: Orthopedics         Objective     Vitals:    05/29/25 1010   BP: 126/64   Pulse: 72   Temp: 98.5 °F (36.9 °C)   SpO2: 96%     Body mass index is 33.73 kg/m².    Physical Exam  Constitutional:       General: He is awake.   HENT:      Head: Normocephalic and atraumatic.   Eyes:      General: Lids are normal.      Extraocular Movements: Extraocular movements intact.      Conjunctiva/sclera: Conjunctivae normal.      Pupils: Pupils are equal, round, and reactive to light.   Pulmonary:      Breath sounds: Normal breath sounds.   Abdominal:      Palpations: Abdomen is soft.   Musculoskeletal:         General: Normal range of motion.        Legs:    Skin:     General: Skin is warm and dry.   Neurological:      Mental Status: He is alert.      Coordination: Coordination is intact.      Deep Tendon Reflexes: Reflexes are normal and symmetric.   Psychiatric:         Behavior: Behavior is cooperative.          Neurological Exam  Mental Status  Awake and alert. Oriented to person, place and time.    Cranial Nerves  CN II: Visual acuity is normal. Visual fields full to confrontation.  CN III, IV, VI: Extraocular movements intact bilaterally. Normal lids and orbits bilaterally. Pupils equal round and reactive to light bilaterally.  CN V: Facial sensation is normal.  CN VII: Full and symmetric facial movement.  CN IX, X: Palate elevates symmetrically. Normal gag reflex.  CN XI: Shoulder shrug strength is normal.  CN XII: Tongue midline without atrophy or fasciculations.    Motor                                               Right                     Left  Deltoid                                   5                          5   Biceps                                   5                          5   Triceps                                  5                          5   Iliopsoas                               5                          5   Quadriceps                           5                          5   Hamstring                             5                          5   Gastrocnemius                     5                           5   Anterior tibialis                      5                          5    Sensory  Sensation is intact to light touch, pinprick, vibration and proprioception in all four extremities.    Reflexes  Deep tendon reflexes are 2+ and symmetric in all four extremities.    Coordination    Finger-to-nose, rapid alternating movements and heel-to-shin normal bilaterally without dysmetria.    Gait  Normal casual, toe, heel and tandem gait.      Assessment & Plan   Independent Review of Radiographic Studies:      I personally reviewed the images from the following studies.    FINDINGS:    No new neuroimaging.    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  If he were symptomatic from his level I would expect  radiculopathy to result in anterior groin or medial thigh pain.  Instead he is pointing towards pain in his posterior thigh and gluteal cleft.  I think that that area is slightly tender to palpation is more likely to be a result of piriformis syndrome and I think physical therapy will be helpful for that.    Medical Decision Making:      Referral to physical therapy         Diagnoses and all orders for this visit:    1. Piriformis muscle pain (Primary)  -     Ambulatory Referral to Physical Therapy for Evaluation & Treatment             Patient Instructions/Recommendations:    Follow-up in 3 months after PT      Indio Blas MD  05/29/25  10:30 EDT

## 2025-05-29 ENCOUNTER — OFFICE VISIT (OUTPATIENT)
Dept: NEUROSURGERY | Facility: CLINIC | Age: 80
End: 2025-05-29
Payer: OTHER GOVERNMENT

## 2025-05-29 VITALS
DIASTOLIC BLOOD PRESSURE: 64 MMHG | HEART RATE: 72 BPM | WEIGHT: 196.6 LBS | OXYGEN SATURATION: 96 % | BODY MASS INDEX: 33.73 KG/M2 | SYSTOLIC BLOOD PRESSURE: 126 MMHG | TEMPERATURE: 98.5 F

## 2025-05-29 DIAGNOSIS — M79.18 PIRIFORMIS MUSCLE PAIN: Primary | ICD-10-CM

## 2025-06-16 ENCOUNTER — TELEPHONE (OUTPATIENT)
Dept: NEUROSURGERY | Facility: CLINIC | Age: 80
End: 2025-06-16
Payer: OTHER GOVERNMENT

## 2025-06-16 NOTE — TELEPHONE ENCOUNTER
Caller: YENNI    Relationship: SELF    Best call back number: [phone]    What form or medical record are you requesting: WORK NOTE    Who is requesting this form or medical record from you: [facility]    How would you like to receive the form or medical records (pick-up, mail, fax): MAIL  If fax, what is the fax number:   If mail, what is the address: [address] [place] [address] [place] [facility] [id number]   If pick-up, provide patient with address and location details    Timeframe paperwork needed: ASAP    Additional notes: PATIENT CALLED NEEDING A WORK NOTE STATING HE WAS SEEN BY DR PARK ON 05.29.25    PLEASE MAIL THE PATIENT HOME    THANK YOU

## 2025-07-16 ENCOUNTER — TELEPHONE (OUTPATIENT)
Dept: NEUROSURGERY | Facility: CLINIC | Age: 80
End: 2025-07-16

## 2025-07-21 ENCOUNTER — TELEPHONE (OUTPATIENT)
Dept: NEUROSURGERY | Facility: CLINIC | Age: 80
End: 2025-07-21
Payer: OTHER GOVERNMENT

## 2025-07-21 NOTE — TELEPHONE ENCOUNTER
Spoke to patient and [facility] in regards to [facility] for PT . Pt is covered under auth for neurosurgery he has 15 visits. [facility] will [facility] and appt time for when patient is going. Patient understands.

## 2025-08-18 ENCOUNTER — TELEPHONE (OUTPATIENT)
Dept: PHYSICAL THERAPY | Facility: CLINIC | Age: 80
End: 2025-08-18

## 2025-08-28 ENCOUNTER — TELEPHONE (OUTPATIENT)
Dept: NEUROSURGERY | Facility: CLINIC | Age: 80
End: 2025-08-28
Payer: OTHER GOVERNMENT

## (undated) DEVICE — MAT FLR ABSORBENT LG 4FT 10 2.5FT

## (undated) DEVICE — DRSNG SURESITE WNDW 2.38X2.75

## (undated) DEVICE — 3M™ IOBAN™ 2 ANTIMICROBIAL INCISE DRAPE 6640EZ: Brand: IOBAN™ 2

## (undated) DEVICE — DRSNG SURESITE WNDW 4X4.5

## (undated) DEVICE — NITINOL WIRE WITH HYDROPHILIC TIP: Brand: SENSOR

## (undated) DEVICE — HANDPIECE SET WITH COAXIAL HIGH FLOW TIP AND SUCTION TUBE: Brand: INTERPULSE

## (undated) DEVICE — PIN STNMAN 3.2MM 9IN
Type: IMPLANTABLE DEVICE | Site: SHOULDER | Status: NON-FUNCTIONAL
Removed: 2018-06-28

## (undated) DEVICE — DRP SLUSH WARMR MACH CIR 44X44IN

## (undated) DEVICE — SYR LUERLOK 30CC

## (undated) DEVICE — PIN STNMAN 3.2MM 9IN
Type: IMPLANTABLE DEVICE | Status: NON-FUNCTIONAL
Removed: 2018-01-18

## (undated) DEVICE — 2108 SERIES SAGITTAL BLADE (19.5 X 1.27 X 81.0MM)

## (undated) DEVICE — PATIENT RETURN ELECTRODE, SINGLE-USE, CONTACT QUALITY MONITORING, ADULT, WITH 9FT CORD, FOR PATIENTS WEIGING OVER 33LBS. (15KG): Brand: MEGADYNE

## (undated) DEVICE — SUT VIC 1 CT1 36IN J947H

## (undated) DEVICE — GLV SURG SENSICARE PI MIC PF SZ7 LF STRL

## (undated) DEVICE — KT DRN EVAC WND PVC PCH WTROC RND 10F400

## (undated) DEVICE — GLV SURG SENSICARE W/ALOE PF LF 7.5 STRL

## (undated) DEVICE — ANTIBACTERIAL UNDYED BRAIDED (POLYGLACTIN 910), SYNTHETIC ABSORBABLE SUTURE: Brand: COATED VICRYL

## (undated) DEVICE — TOTAL TRAY, 16FR 10ML SIL FOLEY, URN: Brand: MEDLINE

## (undated) DEVICE — SKIN PREP TRAY W/CHG: Brand: MEDLINE INDUSTRIES, INC.

## (undated) DEVICE — NEEDLE, QUINCKE 22GX3.5": Brand: MEDLINE INDUSTRIES, INC.

## (undated) DEVICE — SOL IRRG H2O PL/BG 1000ML STRL

## (undated) DEVICE — PREP SOL POVIDONE/IODINE BT 4OZ

## (undated) DEVICE — PK SHLDR OPN 40

## (undated) DEVICE — SUT PDS 1 CT1 36IN Z347H

## (undated) DEVICE — SUT SILK 2/0 TIES 18IN A185H

## (undated) DEVICE — TUBING, SUCTION, 1/4" X 20', STRAIGHT: Brand: MEDLINE INDUSTRIES, INC.

## (undated) DEVICE — PK HIP TOTL 40

## (undated) DEVICE — SOL ISO/ALC RUB 70PCT 4OZ

## (undated) DEVICE — 3M™ IOBAN™ 2 ANTIMICROBIAL INCISE DRAPE 6650EZ: Brand: IOBAN™ 2

## (undated) DEVICE — GLV SURG SENSICARE W/ALOE PF LF 7 STRL

## (undated) DEVICE — SMOKE EVACUATION TUBING WITH 7/8 IN TO 1/4 IN REDUCER: Brand: BUFFALO FILTER

## (undated) DEVICE — GLV SURG BIOGEL LTX PF 8 1/2

## (undated) DEVICE — PREMIUM WET SKIN PREP TRAY: Brand: MEDLINE INDUSTRIES, INC.

## (undated) DEVICE — TRAP FLD MINIVAC MEGADYNE 100ML

## (undated) DEVICE — SPONGE,NEURO,.75"X.75",XR,STRL,LF,10/PK: Brand: MEDLINE

## (undated) DEVICE — BG TRANSF W/COUPLER SPK 600ML

## (undated) DEVICE — SOL ISO/ALC 70PCT 4OZ

## (undated) DEVICE — COLON KIT: Brand: MEDLINE INDUSTRIES, INC.

## (undated) DEVICE — GLV SURG SENSICARE PI LF PF 8 GRN STRL

## (undated) DEVICE — ARM SLING: Brand: DEROYAL

## (undated) DEVICE — 450 ML BOTTLE OF 0.05% CHLORHEXIDINE GLUCONATE IN 99.95% STERILE WATER FOR IRRIGATION, USP AND APPLICATOR.: Brand: IRRISEPT ANTIMICROBIAL WOUND LAVAGE

## (undated) DEVICE — STRIP,CLOSURE,WOUND,MEDI-STRIP,1/2X4: Brand: MEDLINE

## (undated) DEVICE — CONN TBG Y 5 IN 1 LF STRL

## (undated) DEVICE — TBG PENCL TELESCP MEGADYNE SMOKE EVAC 10FT

## (undated) DEVICE — APPL DURAPREP IODOPHOR APL 26ML

## (undated) DEVICE — NDL SPINE 22G 31/2IN BLK

## (undated) DEVICE — POOLE SUCTION HANDLE: Brand: CARDINAL HEALTH

## (undated) DEVICE — PK NEURO SPINE 40

## (undated) DEVICE — GLV SURG SENSICARE W/ALOE PF LF 8 STRL

## (undated) DEVICE — CATH FOL COUNCL 2WY 16F 5CC

## (undated) DEVICE — SYS CLS SKIN PREMIERPRO EXOFINFUSION 22CM

## (undated) DEVICE — NDL HYPO PRECISIONGLIDE REG 25G 1 1/2

## (undated) DEVICE — ELECTRD BLD EZ CLN MOD XLNG 2.75IN

## (undated) DEVICE — SKIN AFFIX SURG ADHESIVE 72/CS 0.55ML: Brand: MEDLINE

## (undated) DEVICE — DRAPE,U/ SHT,SPLIT,PLAS,STERIL: Brand: MEDLINE

## (undated) DEVICE — SOL NACL 0.9PCT 100ML SGL

## (undated) DEVICE — ADHS SKIN DERMABOND TOP ADVANCED

## (undated) DEVICE — APPL CHLORAPREP W/TINT 26ML ORNG

## (undated) DEVICE — SUT VIC 2/0 CT2 27IN J269H

## (undated) DEVICE — PENCL ES MEGADINE EZ/CLEAN BUTN W/HOLSTR 10FT

## (undated) DEVICE — GLV SURG BIOGEL LTX PF 8

## (undated) DEVICE — GLV SURG TRIUMPH CLASSIC PF LTX 8.5 STRL

## (undated) DEVICE — GLV SURG SENSICARE PI PF LF 7 GRN STRL

## (undated) DEVICE — Device: Brand: DEFENDO AIR/WATER/SUCTION AND BIOPSY VALVE

## (undated) DEVICE — TOOL MR8-15MH30 MR8 15CM MATCH 3MM: Brand: MIDAS REX MR8

## (undated) DEVICE — IMPLANTABLE DEVICE
Type: IMPLANTABLE DEVICE | Site: SPINE LUMBAR | Status: NON-FUNCTIONAL
Removed: 2023-02-27

## (undated) DEVICE — DRSNG TELFA PAD NONADH STR 1S 3X8IN

## (undated) DEVICE — ST DIL URETH SCURVE 8TO20FR

## (undated) DEVICE — PICO 7 10CM X 30CM: Brand: PICO™ 7

## (undated) DEVICE — MARKR SKIN W/RULR AND LBL

## (undated) DEVICE — TOOL MR8-31TD3030 MR8 TWST DRIL 3MMX30MM: Brand: MIDAS REX

## (undated) DEVICE — APPL CHLORAPREP HI/LITE 26ML ORNG

## (undated) DEVICE — PK ATS CUST W CARDIOTOMY RESEVOIR

## (undated) DEVICE — GLV SURG PREMIERPRO ORTHO LTX PF SZ7.5 BRN

## (undated) DEVICE — TBG SXN PERFUS W TP

## (undated) DEVICE — SPNG GZ WOVN 4X4IN 12PLY 10/BX STRL

## (undated) DEVICE — BLD DEBAKY BEAVER MAMMATOME 8MM

## (undated) DEVICE — DRP C/ARM 41X74IN

## (undated) DEVICE — SPHR MARKR STEALTH STATION